# Patient Record
Sex: FEMALE | Race: WHITE | Employment: OTHER | URBAN - NONMETROPOLITAN AREA
[De-identification: names, ages, dates, MRNs, and addresses within clinical notes are randomized per-mention and may not be internally consistent; named-entity substitution may affect disease eponyms.]

---

## 2018-10-11 ENCOUNTER — APPOINTMENT (OUTPATIENT)
Dept: GENERAL RADIOLOGY | Age: 73
DRG: 481 | End: 2018-10-11
Payer: MEDICARE

## 2018-10-11 ENCOUNTER — HOSPITAL ENCOUNTER (INPATIENT)
Age: 73
LOS: 11 days | Discharge: NURSING, REHAB OR LONG-TERM CARE | DRG: 481 | End: 2018-10-22
Attending: FAMILY MEDICINE | Admitting: INTERNAL MEDICINE
Payer: MEDICARE

## 2018-10-11 DIAGNOSIS — S72.144A CLOSED NONDISPLACED INTERTROCHANTERIC FRACTURE OF RIGHT FEMUR, INITIAL ENCOUNTER (HCC): ICD-10-CM

## 2018-10-11 DIAGNOSIS — N30.00 ACUTE CYSTITIS WITHOUT HEMATURIA: ICD-10-CM

## 2018-10-11 DIAGNOSIS — S72.21XA CLOSED DISPLACED SUBTROCHANTERIC FRACTURE OF RIGHT FEMUR, INITIAL ENCOUNTER (HCC): Primary | ICD-10-CM

## 2018-10-11 PROBLEM — S72.141A CLOSED INTERTROCHANTERIC FRACTURE OF RIGHT FEMUR (HCC): Status: ACTIVE | Noted: 2018-10-11

## 2018-10-11 PROBLEM — S72.141A CLOSED DISPLACED INTERTROCHANTERIC FRACTURE OF RIGHT FEMUR (HCC): Status: ACTIVE | Noted: 2018-10-11

## 2018-10-11 LAB
-: ABNORMAL
ABSOLUTE EOS #: 0.2 K/UL (ref 0–0.4)
ABSOLUTE IMMATURE GRANULOCYTE: ABNORMAL K/UL (ref 0–0.3)
ABSOLUTE LYMPH #: 1.4 K/UL (ref 1–4.8)
ABSOLUTE MONO #: 0.5 K/UL (ref 0–1)
AMORPHOUS: ABNORMAL
ANION GAP SERPL CALCULATED.3IONS-SCNC: 12 MMOL/L (ref 9–17)
BACTERIA: ABNORMAL
BASOPHILS # BLD: 0 % (ref 0–2)
BASOPHILS ABSOLUTE: 0 K/UL (ref 0–0.2)
BILIRUBIN URINE: NEGATIVE
BUN BLDV-MCNC: 20 MG/DL (ref 8–23)
BUN/CREAT BLD: 27 (ref 9–20)
CALCIUM SERPL-MCNC: 9 MG/DL (ref 8.6–10.4)
CASTS UA: ABNORMAL /LPF
CHLORIDE BLD-SCNC: 103 MMOL/L (ref 98–107)
CO2: 23 MMOL/L (ref 20–31)
COLOR: YELLOW
COMMENT UA: ABNORMAL
CREAT SERPL-MCNC: 0.74 MG/DL (ref 0.5–0.9)
CRYSTALS, UA: ABNORMAL /HPF
DIFFERENTIAL TYPE: YES
EKG ATRIAL RATE: 71 BPM
EKG P AXIS: 63 DEGREES
EKG P-R INTERVAL: 180 MS
EKG Q-T INTERVAL: 424 MS
EKG QRS DURATION: 106 MS
EKG QTC CALCULATION (BAZETT): 460 MS
EKG R AXIS: 54 DEGREES
EKG T AXIS: 87 DEGREES
EKG VENTRICULAR RATE: 71 BPM
EOSINOPHILS RELATIVE PERCENT: 2 % (ref 0–5)
EPITHELIAL CELLS UA: ABNORMAL /HPF
GFR AFRICAN AMERICAN: >60 ML/MIN
GFR NON-AFRICAN AMERICAN: >60 ML/MIN
GFR SERPL CREATININE-BSD FRML MDRD: ABNORMAL ML/MIN/{1.73_M2}
GFR SERPL CREATININE-BSD FRML MDRD: ABNORMAL ML/MIN/{1.73_M2}
GLUCOSE BLD-MCNC: 130 MG/DL (ref 70–99)
GLUCOSE URINE: NEGATIVE
HCT VFR BLD CALC: 43.3 % (ref 36–46)
HEMOGLOBIN: 14.3 G/DL (ref 12–16)
IMMATURE GRANULOCYTES: ABNORMAL %
INR BLD: 1.1
KETONES, URINE: NEGATIVE
LEUKOCYTE ESTERASE, URINE: NEGATIVE
LYMPHOCYTES # BLD: 16 % (ref 15–40)
MCH RBC QN AUTO: 29.1 PG (ref 26–34)
MCHC RBC AUTO-ENTMCNC: 33.1 G/DL (ref 31–37)
MCV RBC AUTO: 88.1 FL (ref 80–100)
MONOCYTES # BLD: 6 % (ref 4–8)
MUCUS: ABNORMAL
NITRITE, URINE: NEGATIVE
NRBC AUTOMATED: ABNORMAL PER 100 WBC
OTHER OBSERVATIONS UA: ABNORMAL
PDW BLD-RTO: 15.1 % (ref 12.1–15.2)
PH UA: 6.5 (ref 5–8)
PLATELET # BLD: 251 K/UL (ref 140–450)
PLATELET ESTIMATE: ABNORMAL
PMV BLD AUTO: ABNORMAL FL (ref 6–12)
POTASSIUM SERPL-SCNC: 3.3 MMOL/L (ref 3.7–5.3)
PROTEIN UA: NEGATIVE
PROTHROMBIN TIME: 10.4 SEC (ref 9–11.6)
RBC # BLD: 4.91 M/UL (ref 4–5.2)
RBC # BLD: ABNORMAL 10*6/UL
RBC UA: ABNORMAL /HPF (ref 0–2)
RENAL EPITHELIAL, UA: ABNORMAL /HPF
SEG NEUTROPHILS: 76 % (ref 47–75)
SEGMENTED NEUTROPHILS ABSOLUTE COUNT: 6.7 K/UL (ref 2.5–7)
SODIUM BLD-SCNC: 138 MMOL/L (ref 135–144)
SPECIFIC GRAVITY UA: 1.01 (ref 1–1.03)
TRICHOMONAS: ABNORMAL
TURBIDITY: CLEAR
URINE HGB: ABNORMAL
UROBILINOGEN, URINE: NORMAL
WBC # BLD: 8.8 K/UL (ref 3.5–11)
WBC # BLD: ABNORMAL 10*3/UL
WBC UA: ABNORMAL /HPF
YEAST: ABNORMAL

## 2018-10-11 PROCEDURE — 94760 N-INVAS EAR/PLS OXIMETRY 1: CPT

## 2018-10-11 PROCEDURE — 99222 1ST HOSP IP/OBS MODERATE 55: CPT | Performed by: INTERNAL MEDICINE

## 2018-10-11 PROCEDURE — 2500000003 HC RX 250 WO HCPCS: Performed by: INTERNAL MEDICINE

## 2018-10-11 PROCEDURE — 6370000000 HC RX 637 (ALT 250 FOR IP): Performed by: INTERNAL MEDICINE

## 2018-10-11 PROCEDURE — 6370000000 HC RX 637 (ALT 250 FOR IP): Performed by: ORTHOPAEDIC SURGERY

## 2018-10-11 PROCEDURE — 1200000000 HC SEMI PRIVATE

## 2018-10-11 PROCEDURE — 71045 X-RAY EXAM CHEST 1 VIEW: CPT

## 2018-10-11 PROCEDURE — 85610 PROTHROMBIN TIME: CPT

## 2018-10-11 PROCEDURE — 96376 TX/PRO/DX INJ SAME DRUG ADON: CPT

## 2018-10-11 PROCEDURE — 99285 EMERGENCY DEPT VISIT HI MDM: CPT

## 2018-10-11 PROCEDURE — 96375 TX/PRO/DX INJ NEW DRUG ADDON: CPT

## 2018-10-11 PROCEDURE — 81001 URINALYSIS AUTO W/SCOPE: CPT

## 2018-10-11 PROCEDURE — 93005 ELECTROCARDIOGRAM TRACING: CPT

## 2018-10-11 PROCEDURE — 85025 COMPLETE CBC W/AUTO DIFF WBC: CPT

## 2018-10-11 PROCEDURE — 6360000002 HC RX W HCPCS: Performed by: INTERNAL MEDICINE

## 2018-10-11 PROCEDURE — 80048 BASIC METABOLIC PNL TOTAL CA: CPT

## 2018-10-11 PROCEDURE — 73502 X-RAY EXAM HIP UNI 2-3 VIEWS: CPT

## 2018-10-11 PROCEDURE — 6360000002 HC RX W HCPCS: Performed by: FAMILY MEDICINE

## 2018-10-11 PROCEDURE — 96374 THER/PROPH/DIAG INJ IV PUSH: CPT

## 2018-10-11 RX ORDER — POTASSIUM CHLORIDE 750 MG/1
10 TABLET, FILM COATED, EXTENDED RELEASE ORAL DAILY
COMMUNITY
End: 2022-08-18 | Stop reason: SDUPTHER

## 2018-10-11 RX ORDER — OXYCODONE HYDROCHLORIDE AND ACETAMINOPHEN 5; 325 MG/1; MG/1
2 TABLET ORAL EVERY 4 HOURS PRN
Status: DISCONTINUED | OUTPATIENT
Start: 2018-10-11 | End: 2018-10-12

## 2018-10-11 RX ORDER — ENALAPRIL MALEATE 5 MG/1
20 TABLET ORAL DAILY
Status: DISCONTINUED | OUTPATIENT
Start: 2018-10-12 | End: 2018-10-22 | Stop reason: HOSPADM

## 2018-10-11 RX ORDER — FENTANYL CITRATE 50 UG/ML
50 INJECTION, SOLUTION INTRAMUSCULAR; INTRAVENOUS ONCE
Status: COMPLETED | OUTPATIENT
Start: 2018-10-11 | End: 2018-10-11

## 2018-10-11 RX ORDER — SODIUM CHLORIDE 0.9 % (FLUSH) 0.9 %
10 SYRINGE (ML) INJECTION PRN
Status: DISCONTINUED | OUTPATIENT
Start: 2018-10-11 | End: 2018-10-12

## 2018-10-11 RX ORDER — ONDANSETRON 2 MG/ML
4 INJECTION INTRAMUSCULAR; INTRAVENOUS EVERY 6 HOURS PRN
Status: DISCONTINUED | OUTPATIENT
Start: 2018-10-11 | End: 2018-10-12

## 2018-10-11 RX ORDER — SODIUM CHLORIDE 9 MG/ML
INJECTION, SOLUTION INTRAVENOUS CONTINUOUS
Status: DISCONTINUED | OUTPATIENT
Start: 2018-10-11 | End: 2018-10-12

## 2018-10-11 RX ORDER — LABETALOL HYDROCHLORIDE 5 MG/ML
10 INJECTION, SOLUTION INTRAVENOUS EVERY 4 HOURS PRN
Status: DISCONTINUED | OUTPATIENT
Start: 2018-10-11 | End: 2018-10-12

## 2018-10-11 RX ORDER — OMEGA-3 FATTY ACIDS/FISH OIL 300-1000MG
200 CAPSULE ORAL 3 TIMES DAILY PRN
COMMUNITY
End: 2018-10-11 | Stop reason: ALTCHOICE

## 2018-10-11 RX ORDER — MORPHINE SULFATE 2 MG/ML
4 INJECTION, SOLUTION INTRAMUSCULAR; INTRAVENOUS EVERY 4 HOURS PRN
Status: DISCONTINUED | OUTPATIENT
Start: 2018-10-11 | End: 2018-10-11

## 2018-10-11 RX ORDER — OXYCODONE HYDROCHLORIDE AND ACETAMINOPHEN 5; 325 MG/1; MG/1
1 TABLET ORAL EVERY 4 HOURS PRN
Status: DISCONTINUED | OUTPATIENT
Start: 2018-10-11 | End: 2018-10-12

## 2018-10-11 RX ORDER — TRAMADOL HYDROCHLORIDE 50 MG/1
50 TABLET ORAL 2 TIMES DAILY PRN
Status: ON HOLD | COMMUNITY
End: 2018-10-22 | Stop reason: HOSPADM

## 2018-10-11 RX ORDER — ONDANSETRON 2 MG/ML
4 INJECTION INTRAMUSCULAR; INTRAVENOUS ONCE
Status: COMPLETED | OUTPATIENT
Start: 2018-10-11 | End: 2018-10-11

## 2018-10-11 RX ORDER — ATORVASTATIN CALCIUM 20 MG/1
40 TABLET, FILM COATED ORAL DAILY
Status: DISCONTINUED | OUTPATIENT
Start: 2018-10-12 | End: 2018-10-22 | Stop reason: HOSPADM

## 2018-10-11 RX ORDER — SODIUM CHLORIDE 0.9 % (FLUSH) 0.9 %
10 SYRINGE (ML) INJECTION EVERY 12 HOURS SCHEDULED
Status: DISCONTINUED | OUTPATIENT
Start: 2018-10-11 | End: 2018-10-12

## 2018-10-11 RX ORDER — ATORVASTATIN CALCIUM 40 MG/1
40 TABLET, FILM COATED ORAL DAILY
COMMUNITY
End: 2021-06-04

## 2018-10-11 RX ORDER — POTASSIUM CHLORIDE 750 MG/1
40 TABLET, FILM COATED, EXTENDED RELEASE ORAL ONCE
Status: COMPLETED | OUTPATIENT
Start: 2018-10-11 | End: 2018-10-11

## 2018-10-11 RX ORDER — ACETAMINOPHEN 325 MG/1
650 TABLET ORAL EVERY 4 HOURS PRN
Status: DISCONTINUED | OUTPATIENT
Start: 2018-10-11 | End: 2018-10-22 | Stop reason: HOSPADM

## 2018-10-11 RX ORDER — CLINDAMYCIN PHOSPHATE 600 MG/50ML
600 INJECTION INTRAVENOUS
Status: DISCONTINUED | OUTPATIENT
Start: 2018-10-12 | End: 2018-10-12

## 2018-10-11 RX ADMIN — OXYCODONE HYDROCHLORIDE AND ACETAMINOPHEN 2 TABLET: 5; 325 TABLET ORAL at 15:06

## 2018-10-11 RX ADMIN — FENTANYL CITRATE 50 MCG: 50 INJECTION INTRAMUSCULAR; INTRAVENOUS at 12:39

## 2018-10-11 RX ADMIN — POTASSIUM CHLORIDE 40 MEQ: 750 TABLET, FILM COATED, EXTENDED RELEASE ORAL at 13:38

## 2018-10-11 RX ADMIN — FENTANYL CITRATE 50 MCG: 50 INJECTION INTRAMUSCULAR; INTRAVENOUS at 11:43

## 2018-10-11 RX ADMIN — HYDROMORPHONE HYDROCHLORIDE 1 MG: 1 INJECTION, SOLUTION INTRAMUSCULAR; INTRAVENOUS; SUBCUTANEOUS at 16:18

## 2018-10-11 RX ADMIN — ONDANSETRON 4 MG: 2 INJECTION INTRAMUSCULAR; INTRAVENOUS at 11:43

## 2018-10-11 RX ADMIN — LABETALOL HYDROCHLORIDE 10 MG: 5 INJECTION, SOLUTION INTRAVENOUS at 18:21

## 2018-10-11 RX ADMIN — ONDANSETRON 4 MG: 2 INJECTION INTRAMUSCULAR; INTRAVENOUS at 23:47

## 2018-10-11 RX ADMIN — MORPHINE SULFATE 4 MG: 2 INJECTION, SOLUTION INTRAMUSCULAR; INTRAVENOUS at 13:38

## 2018-10-11 RX ADMIN — ONDANSETRON 4 MG: 2 INJECTION INTRAMUSCULAR; INTRAVENOUS at 16:50

## 2018-10-11 ASSESSMENT — PAIN SCALES - GENERAL
PAINLEVEL_OUTOF10: 10
PAINLEVEL_OUTOF10: 8
PAINLEVEL_OUTOF10: 4
PAINLEVEL_OUTOF10: 8
PAINLEVEL_OUTOF10: 10
PAINLEVEL_OUTOF10: 9
PAINLEVEL_OUTOF10: 5

## 2018-10-11 ASSESSMENT — PAIN DESCRIPTION - FREQUENCY: FREQUENCY: CONTINUOUS

## 2018-10-11 ASSESSMENT — PAIN DESCRIPTION - LOCATION
LOCATION: HIP
LOCATION: HIP

## 2018-10-11 ASSESSMENT — PAIN DESCRIPTION - ORIENTATION
ORIENTATION: RIGHT
ORIENTATION: RIGHT

## 2018-10-11 ASSESSMENT — PAIN DESCRIPTION - PAIN TYPE
TYPE: ACUTE PAIN
TYPE: ACUTE PAIN

## 2018-10-11 ASSESSMENT — PAIN DESCRIPTION - DESCRIPTORS: DESCRIPTORS: ACHING;SHOOTING

## 2018-10-11 NOTE — ED NOTES
Dr. Antonia Torres at bedside with pt and family. Pt signed informed consent for surgery tomorrow.       Rosemarie Meeks RN  10/11/18 201 [facility] Dayna Shelley  10/11/18 5445

## 2018-10-11 NOTE — CONSULTS
[address]                            Amie Quinteros, [address]                                 CONSULTATION    PATIENT NAME: Joni Flores                      :         1945  MED REC NO:   [id number]                              ROOM:       8156  ACCOUNT NO:   [de-identified]                           ADMIT DATE:  10/11/2018  PROVIDER:     Annette Hayes    CONSULT DATE:  10/11/2018    CONSULTING PHYSICIAN:  Dr. Isai Rasmussen. REASON FOR CONSULTATION:  Right hip fracture, status post fall. PLACE OF CONSULTATION:  Emergency Department, [facility],  Room #1. HISTORY OF PRESENT ILLNESS:  The patient is a 68-year-old white  female, who states she was playing with her puppy. The puppy is a  11month-old beagle. She did get caught up with regard to her feet and  she subsequently fell on the right side. This did happen this a.m. She did have pain, difficulty, felt a pain in the hip specifically. She was brought into the emergency department by EMS, where she is  found to have a hip fracture. She has never had problem with this hip  in the past.  She has had multiple fractures in the past including a  right elbow fracture as well as a right ankle fracture requiring  surgical intervention. She denies any other injury. No head trauma  or loss of consciousness. She does live by herself. Her family  including daughter, granddaughter do accompany her here to the ER. PAST MEDICAL HISTORY:  Significant for left foot peroneal nerve palsy,  she describes this secondary to a back problem, hypertension, multiple  fractures as listed above. PAST SURGICAL HISTORY:  Significant for appendectomy, hysterectomy,  cataracts, right ankle surgery. She also reports a left wrist  surgery. SOCIAL HISTORY:  Again, the patient does live by herself. She denies  any smoking history. No alcohol use.     MEDICATIONS:  Please see the list.

## 2018-10-11 NOTE — PROGRESS NOTES
Dr. Gloria Bazan called and made aware that pt continue to have severe 10/10 pain and crying out loud after 4mg Morphine IV given. Dr. Gloria Bazan states she will order Dilaudid at this time. Dr. Chanel Ruiz also called and made aware of pts uncontrolled pain. New order for 10 lbs bucks traction to the right leg and Percocet 1-2 tabs Q4H PRN.

## 2018-10-11 NOTE — PROGRESS NOTES
10 lbs bucks traction applied to right leg. Pt tolerates fairly well and states relief from traction.

## 2018-10-11 NOTE — ED PROVIDER NOTES
ondansetron [facility]) injection 4 mg (4 mg Intravenous Given 10/11/18 1143)   fentaNYL (SUBLIMAZE) injection 50 mcg (50 mcg Intravenous Given 10/11/18 1239)       New Prescriptions from this visit:    New Prescriptions    No medications on file       Follow-up:  No follow-up provider specified. Final Impression:   1.  Closed displaced subtrochanteric fracture of right femur, initial encounter ([facility] [address]               (Please note that portions of this note were completed with a voice recognition program.  Efforts were made to edit the dictations but occasionally words are mis-transcribed.)    MD Marlene Joseph MD  10/11/18 4503

## 2018-10-12 ENCOUNTER — APPOINTMENT (OUTPATIENT)
Dept: GENERAL RADIOLOGY | Age: 73
DRG: 481 | End: 2018-10-12
Payer: MEDICARE

## 2018-10-12 ENCOUNTER — ANESTHESIA EVENT (OUTPATIENT)
Dept: OPERATING ROOM | Age: 73
DRG: 481 | End: 2018-10-12
Payer: MEDICARE

## 2018-10-12 ENCOUNTER — ANESTHESIA (OUTPATIENT)
Dept: OPERATING ROOM | Age: 73
DRG: 481 | End: 2018-10-12
Payer: MEDICARE

## 2018-10-12 VITALS
OXYGEN SATURATION: 100 % | TEMPERATURE: 98.6 F | SYSTOLIC BLOOD PRESSURE: 103 MMHG | DIASTOLIC BLOOD PRESSURE: 54 MMHG | RESPIRATION RATE: 14 BRPM

## 2018-10-12 LAB
ABO/RH: NORMAL
ABSOLUTE EOS #: 0 K/UL (ref 0–0.4)
ABSOLUTE IMMATURE GRANULOCYTE: ABNORMAL K/UL (ref 0–0.3)
ABSOLUTE LYMPH #: 1.2 K/UL (ref 1–4.8)
ABSOLUTE MONO #: 1.2 K/UL (ref 0–1)
ANION GAP SERPL CALCULATED.3IONS-SCNC: 8 MMOL/L (ref 9–17)
ANTIBODY SCREEN: NEGATIVE
ARM BAND NUMBER: NORMAL
BASOPHILS # BLD: 0 % (ref 0–2)
BASOPHILS ABSOLUTE: 0 K/UL (ref 0–0.2)
BUN BLDV-MCNC: 30 MG/DL (ref 8–23)
BUN/CREAT BLD: 36 (ref 9–20)
CALCIUM SERPL-MCNC: 8.4 MG/DL (ref 8.6–10.4)
CHLORIDE BLD-SCNC: 105 MMOL/L (ref 98–107)
CO2: 24 MMOL/L (ref 20–31)
CREAT SERPL-MCNC: 0.84 MG/DL (ref 0.5–0.9)
DIFFERENTIAL TYPE: YES
EOSINOPHILS RELATIVE PERCENT: 0 % (ref 0–5)
EXPIRATION DATE: NORMAL
GFR AFRICAN AMERICAN: >60 ML/MIN
GFR NON-AFRICAN AMERICAN: >60 ML/MIN
GFR SERPL CREATININE-BSD FRML MDRD: ABNORMAL ML/MIN/{1.73_M2}
GFR SERPL CREATININE-BSD FRML MDRD: ABNORMAL ML/MIN/{1.73_M2}
GLUCOSE BLD-MCNC: 155 MG/DL (ref 70–99)
HCT VFR BLD CALC: 35.5 % (ref 36–46)
HEMOGLOBIN: 11.8 G/DL (ref 12–16)
IMMATURE GRANULOCYTES: ABNORMAL %
LYMPHOCYTES # BLD: 10 % (ref 15–40)
MAGNESIUM: 2.1 MG/DL (ref 1.6–2.6)
MCH RBC QN AUTO: 29.2 PG (ref 26–34)
MCHC RBC AUTO-ENTMCNC: 33.1 G/DL (ref 31–37)
MCV RBC AUTO: 88.1 FL (ref 80–100)
MONOCYTES # BLD: 10 % (ref 4–8)
NRBC AUTOMATED: ABNORMAL PER 100 WBC
PDW BLD-RTO: 15.4 % (ref 12.1–15.2)
PLATELET # BLD: 243 K/UL (ref 140–450)
PLATELET ESTIMATE: ABNORMAL
PMV BLD AUTO: ABNORMAL FL (ref 6–12)
POTASSIUM SERPL-SCNC: 4.4 MMOL/L (ref 3.7–5.3)
RBC # BLD: 4.03 M/UL (ref 4–5.2)
RBC # BLD: ABNORMAL 10*6/UL
SEG NEUTROPHILS: 80 % (ref 47–75)
SEGMENTED NEUTROPHILS ABSOLUTE COUNT: 9.8 K/UL (ref 2.5–7)
SODIUM BLD-SCNC: 137 MMOL/L (ref 135–144)
WBC # BLD: 12.2 K/UL (ref 3.5–11)
WBC # BLD: ABNORMAL 10*3/UL

## 2018-10-12 PROCEDURE — 2580000003 HC RX 258: Performed by: ORTHOPAEDIC SURGERY

## 2018-10-12 PROCEDURE — 76000 FLUOROSCOPY <1 HR PHYS/QHP: CPT

## 2018-10-12 PROCEDURE — 94761 N-INVAS EAR/PLS OXIMETRY MLT: CPT

## 2018-10-12 PROCEDURE — 6370000000 HC RX 637 (ALT 250 FOR IP): Performed by: ORTHOPAEDIC SURGERY

## 2018-10-12 PROCEDURE — 86850 RBC ANTIBODY SCREEN: CPT

## 2018-10-12 PROCEDURE — 86901 BLOOD TYPING SEROLOGIC RH(D): CPT

## 2018-10-12 PROCEDURE — 85025 COMPLETE CBC W/AUTO DIFF WBC: CPT

## 2018-10-12 PROCEDURE — 36415 COLL VENOUS BLD VENIPUNCTURE: CPT

## 2018-10-12 PROCEDURE — 6360000002 HC RX W HCPCS: Performed by: NURSE ANESTHETIST, CERTIFIED REGISTERED

## 2018-10-12 PROCEDURE — 0QS604Z REPOSITION RIGHT UPPER FEMUR WITH INTERNAL FIXATION DEVICE, OPEN APPROACH: ICD-10-PCS | Performed by: ORTHOPAEDIC SURGERY

## 2018-10-12 PROCEDURE — C1713 ANCHOR/SCREW BN/BN,TIS/BN: HCPCS | Performed by: ORTHOPAEDIC SURGERY

## 2018-10-12 PROCEDURE — 3700000000 HC ANESTHESIA ATTENDED CARE: Performed by: ORTHOPAEDIC SURGERY

## 2018-10-12 PROCEDURE — 80048 BASIC METABOLIC PNL TOTAL CA: CPT

## 2018-10-12 PROCEDURE — 7100000000 HC PACU RECOVERY - FIRST 15 MIN: Performed by: ORTHOPAEDIC SURGERY

## 2018-10-12 PROCEDURE — 2580000003 HC RX 258: Performed by: INTERNAL MEDICINE

## 2018-10-12 PROCEDURE — C9113 INJ PANTOPRAZOLE SODIUM, VIA: HCPCS | Performed by: INTERNAL MEDICINE

## 2018-10-12 PROCEDURE — 1200000000 HC SEMI PRIVATE

## 2018-10-12 PROCEDURE — 6360000002 HC RX W HCPCS: Performed by: INTERNAL MEDICINE

## 2018-10-12 PROCEDURE — 86900 BLOOD TYPING SEROLOGIC ABO: CPT

## 2018-10-12 PROCEDURE — 3600000014 HC SURGERY LEVEL 4 ADDTL 15MIN: Performed by: ORTHOPAEDIC SURGERY

## 2018-10-12 PROCEDURE — 2500000003 HC RX 250 WO HCPCS: Performed by: NURSE ANESTHETIST, CERTIFIED REGISTERED

## 2018-10-12 PROCEDURE — 3700000001 HC ADD 15 MINUTES (ANESTHESIA): Performed by: ORTHOPAEDIC SURGERY

## 2018-10-12 PROCEDURE — 3600000004 HC SURGERY LEVEL 4 BASE: Performed by: ORTHOPAEDIC SURGERY

## 2018-10-12 PROCEDURE — 2709999900 HC NON-CHARGEABLE SUPPLY: Performed by: ORTHOPAEDIC SURGERY

## 2018-10-12 PROCEDURE — 2500000003 HC RX 250 WO HCPCS: Performed by: ORTHOPAEDIC SURGERY

## 2018-10-12 PROCEDURE — 7100000001 HC PACU RECOVERY - ADDTL 15 MIN: Performed by: ORTHOPAEDIC SURGERY

## 2018-10-12 PROCEDURE — 83735 ASSAY OF MAGNESIUM: CPT

## 2018-10-12 DEVICE — SCREW BNE L30MM DIA5MM HEX HD DIA35MM CORT TIB TI ALLY FIX: Type: IMPLANTABLE DEVICE | Site: HIP | Status: FUNCTIONAL

## 2018-10-12 DEVICE — ZNN CMN NAIL 13MMX21.5CM 130R
Type: IMPLANTABLE DEVICE | Site: HIP | Status: FUNCTIONAL
Brand: ZIMMER® NATURAL NAIL® SYSTEM

## 2018-10-12 DEVICE — ZNN CMN LAG SCREW 10.5X100
Type: IMPLANTABLE DEVICE | Site: HIP | Status: FUNCTIONAL
Brand: ZIMMER® NATURAL NAIL® SYSTEM

## 2018-10-12 RX ORDER — PROPOFOL 10 MG/ML
INJECTION, EMULSION INTRAVENOUS PRN
Status: DISCONTINUED | OUTPATIENT
Start: 2018-10-12 | End: 2018-10-12 | Stop reason: SDUPTHER

## 2018-10-12 RX ORDER — GLYCOPYRROLATE 1 MG/5 ML
SYRINGE (ML) INTRAVENOUS PRN
Status: DISCONTINUED | OUTPATIENT
Start: 2018-10-12 | End: 2018-10-12 | Stop reason: SDUPTHER

## 2018-10-12 RX ORDER — METOCLOPRAMIDE HYDROCHLORIDE 5 MG/ML
INJECTION INTRAMUSCULAR; INTRAVENOUS PRN
Status: DISCONTINUED | OUTPATIENT
Start: 2018-10-12 | End: 2018-10-12 | Stop reason: SDUPTHER

## 2018-10-12 RX ORDER — DOCUSATE SODIUM 100 MG/1
100 CAPSULE, LIQUID FILLED ORAL 2 TIMES DAILY
Status: DISCONTINUED | OUTPATIENT
Start: 2018-10-12 | End: 2018-10-22 | Stop reason: HOSPADM

## 2018-10-12 RX ORDER — GINSENG 100 MG
CAPSULE ORAL PRN
Status: DISCONTINUED | OUTPATIENT
Start: 2018-10-12 | End: 2018-10-22 | Stop reason: HOSPADM

## 2018-10-12 RX ORDER — CLINDAMYCIN PHOSPHATE 900 MG/50ML
900 INJECTION INTRAVENOUS EVERY 8 HOURS
Status: COMPLETED | OUTPATIENT
Start: 2018-10-12 | End: 2018-10-13

## 2018-10-12 RX ORDER — DEXAMETHASONE SODIUM PHOSPHATE 10 MG/ML
INJECTION INTRAMUSCULAR; INTRAVENOUS PRN
Status: DISCONTINUED | OUTPATIENT
Start: 2018-10-12 | End: 2018-10-12 | Stop reason: SDUPTHER

## 2018-10-12 RX ORDER — POTASSIUM CHLORIDE 750 MG/1
10 TABLET, FILM COATED, EXTENDED RELEASE ORAL DAILY
Status: DISCONTINUED | OUTPATIENT
Start: 2018-10-12 | End: 2018-10-22 | Stop reason: HOSPADM

## 2018-10-12 RX ORDER — FENTANYL CITRATE 50 UG/ML
INJECTION, SOLUTION INTRAMUSCULAR; INTRAVENOUS PRN
Status: DISCONTINUED | OUTPATIENT
Start: 2018-10-12 | End: 2018-10-12 | Stop reason: SDUPTHER

## 2018-10-12 RX ORDER — HYDROCODONE BITARTRATE AND ACETAMINOPHEN 5; 325 MG/1; MG/1
2 TABLET ORAL EVERY 4 HOURS PRN
Status: DISCONTINUED | OUTPATIENT
Start: 2018-10-12 | End: 2018-10-22 | Stop reason: HOSPADM

## 2018-10-12 RX ORDER — ROPIVACAINE HYDROCHLORIDE 5 MG/ML
INJECTION, SOLUTION EPIDURAL; INFILTRATION; PERINEURAL PRN
Status: DISCONTINUED | OUTPATIENT
Start: 2018-10-12 | End: 2018-10-12 | Stop reason: SDUPTHER

## 2018-10-12 RX ORDER — ACETAMINOPHEN 325 MG/1
650 TABLET ORAL EVERY 4 HOURS PRN
Status: DISCONTINUED | OUTPATIENT
Start: 2018-10-12 | End: 2018-10-22 | Stop reason: HOSPADM

## 2018-10-12 RX ORDER — SODIUM CHLORIDE 0.9 % (FLUSH) 0.9 %
10 SYRINGE (ML) INJECTION PRN
Status: DISCONTINUED | OUTPATIENT
Start: 2018-10-12 | End: 2018-10-21

## 2018-10-12 RX ORDER — PROPOFOL 10 MG/ML
INJECTION, EMULSION INTRAVENOUS CONTINUOUS PRN
Status: DISCONTINUED | OUTPATIENT
Start: 2018-10-12 | End: 2018-10-12 | Stop reason: SDUPTHER

## 2018-10-12 RX ORDER — LIDOCAINE HYDROCHLORIDE 10 MG/ML
INJECTION, SOLUTION EPIDURAL; INFILTRATION; INTRACAUDAL; PERINEURAL PRN
Status: DISCONTINUED | OUTPATIENT
Start: 2018-10-12 | End: 2018-10-12 | Stop reason: SDUPTHER

## 2018-10-12 RX ORDER — SODIUM CHLORIDE 9 MG/ML
INJECTION, SOLUTION INTRAVENOUS CONTINUOUS
Status: DISCONTINUED | OUTPATIENT
Start: 2018-10-12 | End: 2018-10-13

## 2018-10-12 RX ORDER — ONDANSETRON 2 MG/ML
4 INJECTION INTRAMUSCULAR; INTRAVENOUS EVERY 6 HOURS PRN
Status: DISCONTINUED | OUTPATIENT
Start: 2018-10-12 | End: 2018-10-22 | Stop reason: HOSPADM

## 2018-10-12 RX ORDER — HYDROCODONE BITARTRATE AND ACETAMINOPHEN 5; 325 MG/1; MG/1
1 TABLET ORAL EVERY 4 HOURS PRN
Status: DISCONTINUED | OUTPATIENT
Start: 2018-10-12 | End: 2018-10-22 | Stop reason: HOSPADM

## 2018-10-12 RX ORDER — PANTOPRAZOLE SODIUM 40 MG/10ML
40 INJECTION, POWDER, LYOPHILIZED, FOR SOLUTION INTRAVENOUS ONCE
Status: COMPLETED | OUTPATIENT
Start: 2018-10-12 | End: 2018-10-12

## 2018-10-12 RX ORDER — SODIUM CHLORIDE 0.9 % (FLUSH) 0.9 %
10 SYRINGE (ML) INJECTION EVERY 12 HOURS SCHEDULED
Status: DISCONTINUED | OUTPATIENT
Start: 2018-10-12 | End: 2018-10-21

## 2018-10-12 RX ORDER — ONDANSETRON 2 MG/ML
INJECTION INTRAMUSCULAR; INTRAVENOUS PRN
Status: DISCONTINUED | OUTPATIENT
Start: 2018-10-12 | End: 2018-10-12 | Stop reason: SDUPTHER

## 2018-10-12 RX ADMIN — ROPIVACAINE HYDROCHLORIDE 30 ML: 5 INJECTION, SOLUTION EPIDURAL; INFILTRATION; PERINEURAL at 12:40

## 2018-10-12 RX ADMIN — PROPOFOL 100 MG: 10 INJECTION, EMULSION INTRAVENOUS at 13:55

## 2018-10-12 RX ADMIN — SODIUM CHLORIDE: 9 INJECTION, SOLUTION INTRAVENOUS at 12:49

## 2018-10-12 RX ADMIN — PHENYLEPHRINE HYDROCHLORIDE 100 MCG: 10 INJECTION INTRAVENOUS at 14:10

## 2018-10-12 RX ADMIN — PANTOPRAZOLE SODIUM 40 MG: 40 INJECTION, POWDER, FOR SOLUTION INTRAVENOUS at 07:51

## 2018-10-12 RX ADMIN — DEXAMETHASONE SODIUM PHOSPHATE 10 MG: 10 INJECTION INTRAMUSCULAR; INTRAVENOUS at 13:55

## 2018-10-12 RX ADMIN — Medication 10 ML: at 07:51

## 2018-10-12 RX ADMIN — CLINDAMYCIN PHOSPHATE 600 MG: 12 INJECTION, SOLUTION INTRAMUSCULAR; INTRAVENOUS at 13:17

## 2018-10-12 RX ADMIN — CLINDAMYCIN PHOSPHATE 900 MG: 18 INJECTION, SOLUTION INTRAMUSCULAR; INTRAVENOUS at 20:58

## 2018-10-12 RX ADMIN — PROPOFOL 100 MCG/KG/MIN: 10 INJECTION, EMULSION INTRAVENOUS at 13:55

## 2018-10-12 RX ADMIN — DOCUSATE SODIUM 100 MG: 100 CAPSULE, LIQUID FILLED ORAL at 20:58

## 2018-10-12 RX ADMIN — HYDROMORPHONE HYDROCHLORIDE 1 MG: 1 INJECTION, SOLUTION INTRAMUSCULAR; INTRAVENOUS; SUBCUTANEOUS at 08:10

## 2018-10-12 RX ADMIN — SODIUM CHLORIDE: 9 INJECTION, SOLUTION INTRAVENOUS at 02:03

## 2018-10-12 RX ADMIN — Medication 0.2 MG: at 14:18

## 2018-10-12 RX ADMIN — SODIUM CHLORIDE: 9 INJECTION, SOLUTION INTRAVENOUS at 19:12

## 2018-10-12 RX ADMIN — METOCLOPRAMIDE 10 MG: 5 INJECTION, SOLUTION INTRAMUSCULAR; INTRAVENOUS at 12:40

## 2018-10-12 RX ADMIN — OXYCODONE HYDROCHLORIDE AND ACETAMINOPHEN 2 TABLET: 5; 325 TABLET ORAL at 00:17

## 2018-10-12 RX ADMIN — PHENYLEPHRINE HYDROCHLORIDE 100 MCG: 10 INJECTION INTRAVENOUS at 14:25

## 2018-10-12 RX ADMIN — PHENYLEPHRINE HYDROCHLORIDE 100 MCG: 10 INJECTION INTRAVENOUS at 14:34

## 2018-10-12 RX ADMIN — FENTANYL CITRATE 50 MCG: 50 INJECTION, SOLUTION INTRAMUSCULAR; INTRAVENOUS at 15:10

## 2018-10-12 RX ADMIN — ONDANSETRON 4 MG: 2 INJECTION, SOLUTION INTRAMUSCULAR; INTRAVENOUS at 13:55

## 2018-10-12 RX ADMIN — LIDOCAINE HYDROCHLORIDE 5 ML: 10 INJECTION, SOLUTION EPIDURAL; INFILTRATION; INTRACAUDAL; PERINEURAL at 13:55

## 2018-10-12 RX ADMIN — ONDANSETRON 4 MG: 2 INJECTION INTRAMUSCULAR; INTRAVENOUS at 06:45

## 2018-10-12 RX ADMIN — PHENYLEPHRINE HYDROCHLORIDE 100 MCG: 10 INJECTION INTRAVENOUS at 14:44

## 2018-10-12 ASSESSMENT — PAIN DESCRIPTION - LOCATION
LOCATION: HIP

## 2018-10-12 ASSESSMENT — PAIN DESCRIPTION - FREQUENCY: FREQUENCY: CONTINUOUS

## 2018-10-12 ASSESSMENT — PAIN SCALES - GENERAL
PAINLEVEL_OUTOF10: 10
PAINLEVEL_OUTOF10: 4
PAINLEVEL_OUTOF10: 5
PAINLEVEL_OUTOF10: 5
PAINLEVEL_OUTOF10: 1
PAINLEVEL_OUTOF10: 0
PAINLEVEL_OUTOF10: 1
PAINLEVEL_OUTOF10: 7
PAINLEVEL_OUTOF10: 2
PAINLEVEL_OUTOF10: 2
PAINLEVEL_OUTOF10: 5
PAINLEVEL_OUTOF10: 0
PAINLEVEL_OUTOF10: 1
PAINLEVEL_OUTOF10: 1
PAINLEVEL_OUTOF10: 10
PAINLEVEL_OUTOF10: 1

## 2018-10-12 ASSESSMENT — PAIN DESCRIPTION - PAIN TYPE
TYPE: SURGICAL PAIN
TYPE: ACUTE PAIN
TYPE: SURGICAL PAIN
TYPE: ACUTE PAIN
TYPE: SURGICAL PAIN

## 2018-10-12 ASSESSMENT — PAIN DESCRIPTION - DESCRIPTORS
DESCRIPTORS: ACHING
DESCRIPTORS: ACHING

## 2018-10-12 ASSESSMENT — PAIN DESCRIPTION - ORIENTATION
ORIENTATION: RIGHT

## 2018-10-12 NOTE — FLOWSHEET NOTE
Nurses in room for shift report. Pt is A&O x4. Patient lays in bed, watching TV and reports pain 1/10. Denies nausea. Neuro check negative for deficit. Pedal pulse 2+, foot warm to touch. Ice pack applied to surgical area. Snack of sherbet provided. Call light in reach.  Electronically signed by Gurinder Alston RN on 10/12/2018 at 7:52 PM

## 2018-10-12 NOTE — H&P
WBC Morphology NOT REPORTED     RBC Morphology NOT REPORTED     Platelet Estimate NOT REPORTED    Basic Metabolic Panel    Collection Time: 10/11/18 11:16 AM   Result Value Ref Range    Glucose 130 (H) 70 - 99 mg/dL    BUN 20 8 - 23 mg/dL    CREATININE 0.74 0.50 - 0.90 mg/dL    Bun/Cre Ratio 27 (H) 9 - 20    Calcium 9.0 8.6 - 10.4 mg/dL    Sodium 138 135 - 144 mmol/L    Potassium 3.3 (L) 3.7 - 5.3 mmol/L    Chloride 103 98 - 107 mmol/L    CO2 23 20 - 31 mmol/L    Anion Gap 12 9 - 17 mmol/L    GFR Non-African American >60 >60 mL/min    GFR African American >60 >60 mL/min    GFR Comment          GFR Staging NOT REPORTED    Protime-INR    Collection Time: 10/11/18 11:16 AM   Result Value Ref Range    Protime 10.4 9.0 - 11.6 sec    INR 1.1    Urinalysis with Microscopic    Collection Time: 10/11/18 11:38 AM   Result Value Ref Range    Color, UA YELLOW YEL    Turbidity UA CLEAR CLEAR    Glucose, Ur NEGATIVE NEG    Bilirubin Urine NEGATIVE NEG    Ketones, Urine NEGATIVE NEG    Specific Gravity, UA 1.015 1.005 - 1.030    Urine Hgb TRACE (A) NEG    pH, UA 6.5 5.0 - 8.0    Protein, UA NEGATIVE NEG    Urobilinogen, Urine Normal NORM    Nitrite, Urine NEGATIVE NEG    Leukocyte Esterase, Urine NEGATIVE NEG    Urinalysis Comments          -          WBC, UA NOT REPORTED 0 /HPF    RBC, UA 0 TO 2 0 - 2 /HPF    Casts UA NOT REPORTED /LPF    Crystals UA NOT REPORTED NONE /HPF    Epithelial Cells UA 0 TO 2 /HPF    Renal Epithelial, Urine NOT REPORTED 0 /HPF    Bacteria, UA NOT REPORTED NONE    Mucus, UA NOT REPORTED NONE    Trichomonas, UA NOT REPORTED NONE    Amorphous, UA NOT REPORTED NONE    Other Observations UA NOT REPORTED NREQ    Yeast, UA NOT REPORTED NONE   EKG 12 Lead    Collection Time: 10/11/18 11:38 AM   Result Value Ref Range    Ventricular Rate 71 BPM    Atrial Rate 71 BPM    P-R Interval 180 ms    QRS Duration 106 ms    Q-T Interval 424 ms    QTc Calculation (Bazett) 460 ms    P Axis 63 degrees    R Axis 54 degrees

## 2018-10-12 NOTE — PROGRESS NOTES
Pt resting quietly in bed, Aceves's Traction remains in place. Denies any pain at this time, states \"I'm still sick to my stomach. \" Pt informed it is not time for nausea medicine yet. Verbalizes understanding. Preop checklist completed. Family visits at bedside. Denies needs. Call light in reach.

## 2018-10-12 NOTE — ANESTHESIA POSTPROCEDURE EVALUATION
Department of Anesthesiology  Postprocedure Note    Patient: Keanu Tilley  MRN: [id number]  YOB: 1945  Date of evaluation: 10/12/2018  Time:  3:18 PM     Procedure Summary     Date:  10/12/18 Room / Location:  [address] [place] [address]    Anesthesia Start:  8729 Anesthesia Stop:  0146    Procedure:  RIGHT HIP INTRAMEDULLAR DARY - Roxana Biomet CM Nail (Right Hip) Diagnosis:  (RIGHT HIP FRACTURE)    Surgeon:  María Arana DO Responsible Provider:  LORI Rush CRNA    Anesthesia Type:  general, regional ASA Status:  3          Anesthesia Type: general, regional    Ivory Phase I:      Ivory Phase II:      Last vitals: Reviewed and per EMR flowsheets.        Anesthesia Post Evaluation    Patient location during evaluation: PACU  Patient participation: complete - patient participated  Level of consciousness: awake and alert  Pain score: 0  Airway patency: patent  Nausea & Vomiting: no nausea and no vomiting  Complications: no  Cardiovascular status: hemodynamically stable  Respiratory status: spontaneous ventilation

## 2018-10-12 NOTE — PLAN OF CARE
Problem: Nutrition  Goal: Optimal nutrition therapy  Outcome: Ongoing  Nutrition Problem: Increased nutrient needs  Intervention: Food and/or Nutrient Delivery: Start oral diet  Nutritional Goals: PO >75% meals with lean protein and adequate fluids  Eval need to supplement post op.

## 2018-10-12 NOTE — ANESTHESIA PRE PROCEDURE
Department of Anesthesiology  Preprocedure Note       Name:  Susanna Talbert   Age:  74 y.o.  :  1945                                          MRN:  [id number]         Date:  10/12/2018      Surgeon: Candance Pottier):  Milagros Hayes DO    Procedure: Procedure(s):  RIGHT HIP INTRAMEDULLAR DARY - AFFIXUS    Medications prior to admission:   Prior to Admission medications    Medication Sig Start Date End Date Taking? Authorizing Provider   traMADol (ULTRAM) 50 MG tablet Take 50 mg by mouth 2 times daily as needed. .   Yes Historical Provider, MD   atorvastatin (LIPITOR) 40 MG tablet Take 40 mg by mouth daily   Yes Historical Provider, MD   potassium chloride (KLOR-CON) 10 MEQ extended release tablet Take 10 mEq by mouth daily   Yes Historical Provider, MD   aspirin (ADAMS ASPIRIN) 325 MG tablet Take 1 tablet by mouth daily. 12/10/14  Yes Servando Andujar DO   enalapril (VASOTEC) 10 MG tablet Take 1 tablet by mouth daily. Patient taking differently: Take 20 mg by mouth daily  14  Yes Ihsan Albrecht MD   ibuprofen (ADVIL;MOTRIN) 200 MG tablet Take 400 mg by mouth every 6 hours as needed.    Yes Historical Provider, MD       Current medications:    Current Facility-Administered Medications   Medication Dose Route Frequency Provider Last Rate Last Dose    [MAR Hold] atorvastatin (LIPITOR) tablet 40 mg  40 mg Oral Daily Calli Cardona MD        [facility] Hold] enalapril (VASOTEC) tablet 20 mg  20 mg Oral Daily Calli Cardona MD        [facility] Hold] sodium chloride flush 0.9 % injection 10 mL  10 mL Intravenous 2 times per day Calli Cardona MD   10 mL at 10/12/18 0751    [MAR Hold] sodium chloride flush 0.9 % injection 10 mL  10 mL Intravenous PRN Calli Cardona MD       [facility] Hold] magnesium hydroxide (MILK OF MAGNESIA) 400 MG/5ML suspension 30 mL  30 mL Oral Daily PRN Calli Cardona MD        [facility] Hold] ondansetron (ZOFRAN) injection 4 mg  4 mg Intravenous Q6H PRN Calli Cardona MD   4 mg at

## 2018-10-12 NOTE — PROGRESS NOTES
Pt relates that she has a \"belly ache\" from the gas in her stomach. Second bath given pre op. Pt tolerates well. Refuses pain med. Tells this nurse it's not that bad.

## 2018-10-13 LAB
HCT VFR BLD CALC: 31.3 % (ref 36–46)
HEMOGLOBIN: 10.3 G/DL (ref 12–16)
MCH RBC QN AUTO: 29.1 PG (ref 26–34)
MCHC RBC AUTO-ENTMCNC: 32.9 G/DL (ref 31–37)
MCV RBC AUTO: 88.6 FL (ref 80–100)
NRBC AUTOMATED: ABNORMAL PER 100 WBC
PDW BLD-RTO: 15.1 % (ref 12.1–15.2)
PLATELET # BLD: 200 K/UL (ref 140–450)
PMV BLD AUTO: ABNORMAL FL (ref 6–12)
RBC # BLD: 3.54 M/UL (ref 4–5.2)
WBC # BLD: 14.1 K/UL (ref 3.5–11)

## 2018-10-13 PROCEDURE — 6370000000 HC RX 637 (ALT 250 FOR IP): Performed by: ORTHOPAEDIC SURGERY

## 2018-10-13 PROCEDURE — 6370000000 HC RX 637 (ALT 250 FOR IP): Performed by: INTERNAL MEDICINE

## 2018-10-13 PROCEDURE — 2580000003 HC RX 258: Performed by: ORTHOPAEDIC SURGERY

## 2018-10-13 PROCEDURE — 1200000000 HC SEMI PRIVATE

## 2018-10-13 PROCEDURE — 85027 COMPLETE CBC AUTOMATED: CPT

## 2018-10-13 PROCEDURE — 94761 N-INVAS EAR/PLS OXIMETRY MLT: CPT

## 2018-10-13 PROCEDURE — 36415 COLL VENOUS BLD VENIPUNCTURE: CPT

## 2018-10-13 PROCEDURE — 97161 PT EVAL LOW COMPLEX 20 MIN: CPT

## 2018-10-13 PROCEDURE — 6360000002 HC RX W HCPCS: Performed by: INTERNAL MEDICINE

## 2018-10-13 PROCEDURE — 6360000002 HC RX W HCPCS: Performed by: ORTHOPAEDIC SURGERY

## 2018-10-13 PROCEDURE — 2500000003 HC RX 250 WO HCPCS: Performed by: ORTHOPAEDIC SURGERY

## 2018-10-13 RX ORDER — BISACODYL 10 MG
10 SUPPOSITORY, RECTAL RECTAL
Status: ACTIVE | OUTPATIENT
Start: 2018-10-13 | End: 2018-10-13

## 2018-10-13 RX ADMIN — ONDANSETRON 4 MG: 2 INJECTION INTRAMUSCULAR; INTRAVENOUS at 16:56

## 2018-10-13 RX ADMIN — ONDANSETRON 4 MG: 2 INJECTION INTRAMUSCULAR; INTRAVENOUS at 09:43

## 2018-10-13 RX ADMIN — DOCUSATE SODIUM 100 MG: 100 CAPSULE, LIQUID FILLED ORAL at 08:22

## 2018-10-13 RX ADMIN — POTASSIUM CHLORIDE 10 MEQ: 750 TABLET, FILM COATED, EXTENDED RELEASE ORAL at 08:30

## 2018-10-13 RX ADMIN — HYDROMORPHONE HYDROCHLORIDE 1 MG: 1 INJECTION, SOLUTION INTRAMUSCULAR; INTRAVENOUS; SUBCUTANEOUS at 13:41

## 2018-10-13 RX ADMIN — SODIUM CHLORIDE: 9 INJECTION, SOLUTION INTRAVENOUS at 09:27

## 2018-10-13 RX ADMIN — ENOXAPARIN SODIUM 40 MG: 40 INJECTION SUBCUTANEOUS at 08:21

## 2018-10-13 RX ADMIN — ATORVASTATIN CALCIUM 40 MG: 20 TABLET, FILM COATED ORAL at 08:21

## 2018-10-13 RX ADMIN — CLINDAMYCIN PHOSPHATE 900 MG: 18 INJECTION, SOLUTION INTRAMUSCULAR; INTRAVENOUS at 05:41

## 2018-10-13 RX ADMIN — DOCUSATE SODIUM 100 MG: 100 CAPSULE, LIQUID FILLED ORAL at 19:54

## 2018-10-13 RX ADMIN — HYDROCODONE BITARTRATE AND ACETAMINOPHEN 2 TABLET: 5; 325 TABLET ORAL at 16:57

## 2018-10-13 RX ADMIN — BISACODYL 10 MG: 5 TABLET, COATED ORAL at 10:31

## 2018-10-13 RX ADMIN — Medication 10 ML: at 19:55

## 2018-10-13 RX ADMIN — HYDROCODONE BITARTRATE AND ACETAMINOPHEN 2 TABLET: 5; 325 TABLET ORAL at 03:07

## 2018-10-13 RX ADMIN — HYDROCODONE BITARTRATE AND ACETAMINOPHEN 2 TABLET: 5; 325 TABLET ORAL at 09:43

## 2018-10-13 RX ADMIN — ONDANSETRON 4 MG: 2 INJECTION INTRAMUSCULAR; INTRAVENOUS at 03:23

## 2018-10-13 RX ADMIN — ENALAPRIL MALEATE 20 MG: 5 TABLET ORAL at 08:22

## 2018-10-13 ASSESSMENT — PAIN DESCRIPTION - ORIENTATION
ORIENTATION: RIGHT
ORIENTATION: RIGHT;ANTERIOR
ORIENTATION: RIGHT
ORIENTATION: RIGHT
ORIENTATION: RIGHT;ANTERIOR
ORIENTATION: RIGHT;ANTERIOR

## 2018-10-13 ASSESSMENT — PAIN SCALES - GENERAL
PAINLEVEL_OUTOF10: 0
PAINLEVEL_OUTOF10: 1
PAINLEVEL_OUTOF10: 3
PAINLEVEL_OUTOF10: 10
PAINLEVEL_OUTOF10: 3
PAINLEVEL_OUTOF10: 5
PAINLEVEL_OUTOF10: 2
PAINLEVEL_OUTOF10: 7
PAINLEVEL_OUTOF10: 6
PAINLEVEL_OUTOF10: 10
PAINLEVEL_OUTOF10: 0
PAINLEVEL_OUTOF10: 10
PAINLEVEL_OUTOF10: 5
PAINLEVEL_OUTOF10: 3
PAINLEVEL_OUTOF10: 4
PAINLEVEL_OUTOF10: 7
PAINLEVEL_OUTOF10: 0

## 2018-10-13 ASSESSMENT — PAIN DESCRIPTION - FREQUENCY
FREQUENCY: CONTINUOUS

## 2018-10-13 ASSESSMENT — PAIN DESCRIPTION - DESCRIPTORS
DESCRIPTORS: ACHING;DULL;HEAVINESS
DESCRIPTORS: DULL;HEAVINESS
DESCRIPTORS: DULL
DESCRIPTORS: ACHING;SHARP;SHOOTING
DESCRIPTORS: DULL;HEAVINESS
DESCRIPTORS: DULL
DESCRIPTORS: JABBING;SHOOTING;STABBING
DESCRIPTORS: ACHING;CRAMPING;HEAVINESS;SHARP;SHOOTING
DESCRIPTORS: DISCOMFORT
DESCRIPTORS: ACHING;CRAMPING

## 2018-10-13 ASSESSMENT — PAIN DESCRIPTION - PAIN TYPE
TYPE: SURGICAL PAIN

## 2018-10-13 ASSESSMENT — PAIN DESCRIPTION - DIRECTION
RADIATING_TOWARDS: DOWN LEG

## 2018-10-13 ASSESSMENT — PAIN DESCRIPTION - PROGRESSION
CLINICAL_PROGRESSION: GRADUALLY IMPROVING
CLINICAL_PROGRESSION: GRADUALLY WORSENING
CLINICAL_PROGRESSION: NOT CHANGED
CLINICAL_PROGRESSION: GRADUALLY IMPROVING
CLINICAL_PROGRESSION: NOT CHANGED
CLINICAL_PROGRESSION: GRADUALLY IMPROVING
CLINICAL_PROGRESSION: GRADUALLY WORSENING
CLINICAL_PROGRESSION: GRADUALLY IMPROVING
CLINICAL_PROGRESSION: NOT CHANGED
CLINICAL_PROGRESSION: NOT CHANGED
CLINICAL_PROGRESSION: GRADUALLY IMPROVING
CLINICAL_PROGRESSION: GRADUALLY IMPROVING
CLINICAL_PROGRESSION: NOT CHANGED
CLINICAL_PROGRESSION: NOT CHANGED
CLINICAL_PROGRESSION: GRADUALLY IMPROVING

## 2018-10-13 ASSESSMENT — PAIN DESCRIPTION - ONSET
ONSET: ON-GOING

## 2018-10-13 ASSESSMENT — PAIN DESCRIPTION - LOCATION
LOCATION: HIP;LEG
LOCATION: LEG
LOCATION: HIP;LEG

## 2018-10-13 NOTE — PROGRESS NOTES
Hospitalist Progress Note  10/13/2018 10:09 AM  Subjective:   Admit Date: 10/11/2018  PCP: Natividad Reid MD    Interval History: [facility] states today she feels better, pain in the right hip improved after surgical repair. States nausea is caused by narcotics, but Zofran helps to offset it. She had some abdominal distention this morning, resolved after small bowel movement. Denies headache, chest pain, shortness of breath. Vitals stable. Labs reviewed. Diet: DIET GENERAL;  Medications:   Scheduled Meds:   bisacodyl  10 mg Oral Once    potassium chloride  10 mEq Oral Daily    sodium chloride flush  10 mL Intravenous 2 times per day    docusate sodium  100 mg Oral BID    enoxaparin  40 mg Subcutaneous Daily    atorvastatin  40 mg Oral Daily    enalapril  20 mg Oral Daily     Continuous Infusions:  PRN Medications: bisacodyl, sodium chloride flush, acetaminophen, ondansetron, HYDROcodone 5 mg - acetaminophen **OR** HYDROcodone 5 mg - acetaminophen, bacitracin, acetaminophen, HYDROmorphone    Objective:   Vitals: /83   Pulse 74   Temp 98.4 °F (36.9 °C) (Oral)   Resp 20   Ht 5' 5\" (1.651 m)   Wt 163 lb 1.6 oz (74 kg)   SpO2 98%   BMI 27.14 kg/m²   BMI: Body mass index is 27.14 kg/m².     CBC:   Recent Labs      10/11/18   1116  10/12/18   0600  10/13/18   0500   WBC  8.8  12.2*  14.1*   HGB  14.3  11.8*  10.3*   PLT  251  243  200     BMP:    Recent Labs      10/11/18   1116  10/12/18   0600   NA  138  137   K  3.3*  4.4   CL  103  105   CO2  23  24   BUN  20  30*   CREATININE  0.74  0.84   GLUCOSE  130*  155*       INR:   Recent Labs      10/11/18   1116   INR  1.1       Physical Exam:    General Appearance: alert and oriented to person, place and time, in no acute distress  Cardiovascular: normal rate, regular rhythm, normal S1 and S2, 2/6 systolic murmur best appreciated in the left second intercostal space , no rubs, clicks, or gallops, distal pulses intact  Pulmonary/Chest: clear to

## 2018-10-13 NOTE — OP NOTE
[place] [address]                               OPERATIVE REPORT    PATIENT NAME: Karen Chauhan                      :         1945  MED REC NO:   [id number]                              ROOM:       6557  ACCOUNT NO:   [de-identified]                           ADMIT DATE:  10/11/2018  PROVIDER:     Eliud Pastrana Pocos    DATE OF PROCEDURE:  10/12/2018    PREOPERATIVE DIAGNOSIS:  Right hip intertrochanteric/subtrochanteric  fracture, pathologic in nature secondary to osteopenia, likely  clinical osteoporosis. POSTOPERATIVE DIAGNOSIS:  Right hip intertrochanteric/subtrochanteric  fracture, pathologic in nature secondary to osteopenia, likely  clinical osteoporosis. OPERATION PERFORMED:  Right hip intramedullary niru. SURGEON:  Eliud Negro. Pocos    ANESTHESIA:  [place]eter with regional block, general.    ESTIMATED BLOOD LOSS:  50 mL. INTRAVENOUS FLUIDS:  Please see the operative records. URINARY OUTPUT:  Please see the operative records. SPECIMEN:  None. IMPLANT:  Roxana cephalomedullary nail, this is the short nail 130  degree x 13 mm. COMPLICATIONS:  None. DRAINS:  None. HISTORY/OPERATIVE INDICATIONS:  The patient is a 42-year-old white  female who presents after sustaining a fall yesterday. She did get  tangled up apparently with her puppy. She was seen and evaluated in  the emergency department setting where she was found to have a  fracture of the left hip, orthopedics and myself consulted. The  patient was seen and evaluated. Recommendation for the above  procedure was made and the procedure is undertaken this day. The  patient did have preoperative risk assessment and cardiology in the  interim. Intraoperative pathology upon placement of the patient on  the fracture table, adequate reduction of the fracture pattern is  realized with the C-arm intensifier.   The hip is

## 2018-10-13 NOTE — PROGRESS NOTES
Supine to Sit: Moderate assistance, Maximal assistance (2 assist for right LE and upper body management)  Sit to Stand: Moderate Assistance ( 2 assist; less assist from higher surfaces)  Stand to sit: Minimal Assistance (x1 for safety)  Bed to Chair: Minimal assistance, Moderate assistance (x2 for safety; patient able to maintain TTWB RLE)              Balance  Sitting - Static: Good  Sitting - Dynamic: Fair, +  Standing - Static: Fair    Assessment  Activity Tolerance: Patient limited by pain, Patient Tolerated treatment well   Body structures, Functions, Activity limitations: Decreased functional mobility , Decreased ADL status, Decreased strength, Decreased balance  Chart Reviewed: Yes  Assessment: Patient underwent ORIF right LE with a significant decline in fucntional mobility due to TTWB status, pain, and weakness. Patient requires 2 assist for basic  mobility bed to chair. She also requries assist with self-care tasks following her recent surgery. Patient will  require additional skilled therapy to address fucntional mobility deficits however due to her TTWB status for up to possibly 6 weeks she would require assist at home or care in an extended facility until wt bearing increases.   She also has a long standing left foot drop which requires bracing which will affect mobility due to weight bearing/ use of left LE needed due to right hip fracture  Prognosis: Good  Discharge Recommendations: Continue to assess pending progress     Type of devices: Call light within reach, Gait belt, Left in chair     Plan  Times per week: Daily  Times per day:  (1-2x oer day;  1x Sat/Sunday)  Current Treatment Recommendations: Strengthening, Functional Mobility Training, Gait Training, Stair training, Patient/Caregiver Education & Training    Goals  Short term goals  Time Frame for Short term goals: 5 days  Short term goal 1: Transfer supine to sit with min/mod assist x 1 for right LE management  Short term goal 2:

## 2018-10-14 LAB
ABSOLUTE EOS #: 0.2 K/UL (ref 0–0.4)
ABSOLUTE IMMATURE GRANULOCYTE: ABNORMAL K/UL (ref 0–0.3)
ABSOLUTE LYMPH #: 2.1 K/UL (ref 1–4.8)
ABSOLUTE MONO #: 1.5 K/UL (ref 0–1)
ANION GAP SERPL CALCULATED.3IONS-SCNC: 7 MMOL/L (ref 9–17)
BASOPHILS # BLD: 0 % (ref 0–2)
BASOPHILS ABSOLUTE: 0.1 K/UL (ref 0–0.2)
BUN BLDV-MCNC: 33 MG/DL (ref 8–23)
BUN/CREAT BLD: 48 (ref 9–20)
CALCIUM SERPL-MCNC: 8.3 MG/DL (ref 8.6–10.4)
CHLORIDE BLD-SCNC: 109 MMOL/L (ref 98–107)
CO2: 23 MMOL/L (ref 20–31)
CREAT SERPL-MCNC: 0.69 MG/DL (ref 0.5–0.9)
DIFFERENTIAL TYPE: YES
EOSINOPHILS RELATIVE PERCENT: 2 % (ref 0–5)
GFR AFRICAN AMERICAN: >60 ML/MIN
GFR NON-AFRICAN AMERICAN: >60 ML/MIN
GFR SERPL CREATININE-BSD FRML MDRD: ABNORMAL ML/MIN/{1.73_M2}
GFR SERPL CREATININE-BSD FRML MDRD: ABNORMAL ML/MIN/{1.73_M2}
GLUCOSE BLD-MCNC: 112 MG/DL (ref 70–99)
HCT VFR BLD CALC: 29.6 % (ref 36–46)
HEMOGLOBIN: 9.7 G/DL (ref 12–16)
IMMATURE GRANULOCYTES: ABNORMAL %
LYMPHOCYTES # BLD: 17 % (ref 15–40)
MCH RBC QN AUTO: 29 PG (ref 26–34)
MCHC RBC AUTO-ENTMCNC: 32.7 G/DL (ref 31–37)
MCV RBC AUTO: 88.7 FL (ref 80–100)
MONOCYTES # BLD: 13 % (ref 4–8)
NRBC AUTOMATED: ABNORMAL PER 100 WBC
PDW BLD-RTO: 15.5 % (ref 12.1–15.2)
PLATELET # BLD: 199 K/UL (ref 140–450)
PLATELET ESTIMATE: ABNORMAL
PMV BLD AUTO: ABNORMAL FL (ref 6–12)
POTASSIUM SERPL-SCNC: 4 MMOL/L (ref 3.7–5.3)
RBC # BLD: 3.34 M/UL (ref 4–5.2)
RBC # BLD: ABNORMAL 10*6/UL
SEG NEUTROPHILS: 68 % (ref 47–75)
SEGMENTED NEUTROPHILS ABSOLUTE COUNT: 8.2 K/UL (ref 2.5–7)
SODIUM BLD-SCNC: 139 MMOL/L (ref 135–144)
WBC # BLD: 12.1 K/UL (ref 3.5–11)
WBC # BLD: ABNORMAL 10*3/UL

## 2018-10-14 PROCEDURE — 6360000002 HC RX W HCPCS: Performed by: ORTHOPAEDIC SURGERY

## 2018-10-14 PROCEDURE — 6370000000 HC RX 637 (ALT 250 FOR IP): Performed by: INTERNAL MEDICINE

## 2018-10-14 PROCEDURE — 94761 N-INVAS EAR/PLS OXIMETRY MLT: CPT

## 2018-10-14 PROCEDURE — 1200000000 HC SEMI PRIVATE

## 2018-10-14 PROCEDURE — 6360000002 HC RX W HCPCS: Performed by: INTERNAL MEDICINE

## 2018-10-14 PROCEDURE — 97110 THERAPEUTIC EXERCISES: CPT

## 2018-10-14 PROCEDURE — 97530 THERAPEUTIC ACTIVITIES: CPT

## 2018-10-14 PROCEDURE — 6370000000 HC RX 637 (ALT 250 FOR IP): Performed by: ORTHOPAEDIC SURGERY

## 2018-10-14 PROCEDURE — 36415 COLL VENOUS BLD VENIPUNCTURE: CPT

## 2018-10-14 PROCEDURE — 85025 COMPLETE CBC W/AUTO DIFF WBC: CPT

## 2018-10-14 PROCEDURE — 80048 BASIC METABOLIC PNL TOTAL CA: CPT

## 2018-10-14 PROCEDURE — 2580000003 HC RX 258: Performed by: ORTHOPAEDIC SURGERY

## 2018-10-14 RX ADMIN — ONDANSETRON 4 MG: 2 INJECTION INTRAMUSCULAR; INTRAVENOUS at 16:49

## 2018-10-14 RX ADMIN — Medication 10 ML: at 07:55

## 2018-10-14 RX ADMIN — ONDANSETRON 4 MG: 2 INJECTION INTRAMUSCULAR; INTRAVENOUS at 10:54

## 2018-10-14 RX ADMIN — HYDROCODONE BITARTRATE AND ACETAMINOPHEN 2 TABLET: 5; 325 TABLET ORAL at 16:48

## 2018-10-14 RX ADMIN — ENOXAPARIN SODIUM 40 MG: 40 INJECTION SUBCUTANEOUS at 07:52

## 2018-10-14 RX ADMIN — Medication 10 ML: at 16:49

## 2018-10-14 RX ADMIN — Medication 10 ML: at 07:31

## 2018-10-14 RX ADMIN — HYDROCODONE BITARTRATE AND ACETAMINOPHEN 2 TABLET: 5; 325 TABLET ORAL at 21:19

## 2018-10-14 RX ADMIN — Medication 10 ML: at 21:19

## 2018-10-14 RX ADMIN — HYDROCODONE BITARTRATE AND ACETAMINOPHEN 2 TABLET: 5; 325 TABLET ORAL at 10:54

## 2018-10-14 RX ADMIN — DOCUSATE SODIUM 100 MG: 100 CAPSULE, LIQUID FILLED ORAL at 07:52

## 2018-10-14 RX ADMIN — ATORVASTATIN CALCIUM 40 MG: 20 TABLET, FILM COATED ORAL at 07:51

## 2018-10-14 RX ADMIN — ENALAPRIL MALEATE 20 MG: 5 TABLET ORAL at 07:51

## 2018-10-14 RX ADMIN — ONDANSETRON 4 MG: 2 INJECTION INTRAMUSCULAR; INTRAVENOUS at 03:41

## 2018-10-14 RX ADMIN — Medication 10 ML: at 10:53

## 2018-10-14 RX ADMIN — HYDROCODONE BITARTRATE AND ACETAMINOPHEN 2 TABLET: 5; 325 TABLET ORAL at 03:41

## 2018-10-14 RX ADMIN — POTASSIUM CHLORIDE 10 MEQ: 750 TABLET, FILM COATED, EXTENDED RELEASE ORAL at 07:54

## 2018-10-14 RX ADMIN — HYDROMORPHONE HYDROCHLORIDE 1 MG: 1 INJECTION, SOLUTION INTRAMUSCULAR; INTRAVENOUS; SUBCUTANEOUS at 07:30

## 2018-10-14 RX ADMIN — Medication 10 ML: at 07:54

## 2018-10-14 ASSESSMENT — PAIN SCALES - GENERAL
PAINLEVEL_OUTOF10: 0
PAINLEVEL_OUTOF10: 7
PAINLEVEL_OUTOF10: 0
PAINLEVEL_OUTOF10: 3
PAINLEVEL_OUTOF10: 7
PAINLEVEL_OUTOF10: 5
PAINLEVEL_OUTOF10: 10
PAINLEVEL_OUTOF10: 4
PAINLEVEL_OUTOF10: 3
PAINLEVEL_OUTOF10: 7

## 2018-10-14 ASSESSMENT — PAIN DESCRIPTION - ONSET
ONSET: ON-GOING

## 2018-10-14 ASSESSMENT — PAIN DESCRIPTION - FREQUENCY
FREQUENCY: CONTINUOUS
FREQUENCY: OTHER (COMMENT)
FREQUENCY: CONTINUOUS

## 2018-10-14 ASSESSMENT — PAIN DESCRIPTION - DESCRIPTORS
DESCRIPTORS: DULL
DESCRIPTORS: DULL
DESCRIPTORS: ACHING;HEAVINESS;SHARP;SHOOTING
DESCRIPTORS: DULL
DESCRIPTORS: DULL
DESCRIPTORS: ACHING;SHOOTING

## 2018-10-14 ASSESSMENT — PAIN DESCRIPTION - PROGRESSION
CLINICAL_PROGRESSION: GRADUALLY IMPROVING
CLINICAL_PROGRESSION: NOT CHANGED
CLINICAL_PROGRESSION: GRADUALLY IMPROVING
CLINICAL_PROGRESSION: GRADUALLY WORSENING
CLINICAL_PROGRESSION: GRADUALLY WORSENING
CLINICAL_PROGRESSION: GRADUALLY IMPROVING
CLINICAL_PROGRESSION: GRADUALLY IMPROVING
CLINICAL_PROGRESSION: GRADUALLY WORSENING
CLINICAL_PROGRESSION: GRADUALLY WORSENING
CLINICAL_PROGRESSION: GRADUALLY IMPROVING
CLINICAL_PROGRESSION: GRADUALLY WORSENING
CLINICAL_PROGRESSION: OTHER (COMMENT)
CLINICAL_PROGRESSION: OTHER (COMMENT)
CLINICAL_PROGRESSION: GRADUALLY IMPROVING
CLINICAL_PROGRESSION: NOT CHANGED
CLINICAL_PROGRESSION: GRADUALLY IMPROVING
CLINICAL_PROGRESSION: GRADUALLY WORSENING
CLINICAL_PROGRESSION: GRADUALLY IMPROVING

## 2018-10-14 ASSESSMENT — PAIN DESCRIPTION - LOCATION
LOCATION: LEG

## 2018-10-14 ASSESSMENT — PAIN DESCRIPTION - PAIN TYPE
TYPE: SURGICAL PAIN

## 2018-10-14 ASSESSMENT — PAIN DESCRIPTION - ORIENTATION
ORIENTATION: RIGHT

## 2018-10-14 NOTE — PROGRESS NOTES
stand with min assist  Short term goal 3: Transfer bed to chair with walker TTWB RLE and min assist x 1    Long Term Goals  Time Frame for Long term goals : 14 days  Long term goal 1: Transfer supine to sit with CGA x 1  Long term goal 2: Transfer sit to stand with CG/min assist x 1  Long term goal 3: Ambulate with walker 10 ft to bathroom to complete self-care tasks with CGa x 1    [facility] License Number: [id number]    Date: 10/14/2018

## 2018-10-15 LAB
-: ABNORMAL
ABSOLUTE EOS #: 0.3 K/UL (ref 0–0.4)
ABSOLUTE IMMATURE GRANULOCYTE: ABNORMAL K/UL (ref 0–0.3)
ABSOLUTE LYMPH #: 1.9 K/UL (ref 1–4.8)
ABSOLUTE MONO #: 1.1 K/UL (ref 0–1)
AMORPHOUS: ABNORMAL
ANION GAP SERPL CALCULATED.3IONS-SCNC: 6 MMOL/L (ref 9–17)
BACTERIA: ABNORMAL
BASOPHILS # BLD: 0 % (ref 0–2)
BASOPHILS ABSOLUTE: 0 K/UL (ref 0–0.2)
BILIRUBIN URINE: NEGATIVE
BUN BLDV-MCNC: 23 MG/DL (ref 8–23)
BUN/CREAT BLD: 42 (ref 9–20)
CALCIUM SERPL-MCNC: 8 MG/DL (ref 8.6–10.4)
CASTS UA: ABNORMAL /LPF
CHLORIDE BLD-SCNC: 107 MMOL/L (ref 98–107)
CO2: 24 MMOL/L (ref 20–31)
COLOR: YELLOW
COMMENT UA: ABNORMAL
CREAT SERPL-MCNC: 0.55 MG/DL (ref 0.5–0.9)
CRYSTALS, UA: ABNORMAL /HPF
DIFFERENTIAL TYPE: YES
EOSINOPHILS RELATIVE PERCENT: 3 % (ref 0–5)
EPITHELIAL CELLS UA: ABNORMAL /HPF
GFR AFRICAN AMERICAN: >60 ML/MIN
GFR NON-AFRICAN AMERICAN: >60 ML/MIN
GFR SERPL CREATININE-BSD FRML MDRD: ABNORMAL ML/MIN/{1.73_M2}
GFR SERPL CREATININE-BSD FRML MDRD: ABNORMAL ML/MIN/{1.73_M2}
GLUCOSE BLD-MCNC: 115 MG/DL (ref 70–99)
GLUCOSE URINE: NEGATIVE
HCT VFR BLD CALC: 28.7 % (ref 36–46)
HEMOGLOBIN: 9.4 G/DL (ref 12–16)
IMMATURE GRANULOCYTES: ABNORMAL %
KETONES, URINE: NEGATIVE
LEUKOCYTE ESTERASE, URINE: ABNORMAL
LYMPHOCYTES # BLD: 17 % (ref 15–40)
MCH RBC QN AUTO: 29 PG (ref 26–34)
MCHC RBC AUTO-ENTMCNC: 32.8 G/DL (ref 31–37)
MCV RBC AUTO: 88.7 FL (ref 80–100)
MONOCYTES # BLD: 10 % (ref 4–8)
MUCUS: ABNORMAL
NITRITE, URINE: POSITIVE
NRBC AUTOMATED: ABNORMAL PER 100 WBC
OTHER OBSERVATIONS UA: ABNORMAL
PDW BLD-RTO: 15.3 % (ref 12.1–15.2)
PH UA: 6 (ref 5–8)
PLATELET # BLD: 199 K/UL (ref 140–450)
PLATELET ESTIMATE: ABNORMAL
PMV BLD AUTO: ABNORMAL FL (ref 6–12)
POTASSIUM SERPL-SCNC: 4 MMOL/L (ref 3.7–5.3)
PROTEIN UA: ABNORMAL
RBC # BLD: 3.24 M/UL (ref 4–5.2)
RBC # BLD: ABNORMAL 10*6/UL
RBC UA: ABNORMAL /HPF (ref 0–2)
RENAL EPITHELIAL, UA: ABNORMAL /HPF
SEG NEUTROPHILS: 70 % (ref 47–75)
SEGMENTED NEUTROPHILS ABSOLUTE COUNT: 7.9 K/UL (ref 2.5–7)
SODIUM BLD-SCNC: 137 MMOL/L (ref 135–144)
SPECIFIC GRAVITY UA: 1.02 (ref 1–1.03)
TRICHOMONAS: ABNORMAL
TURBIDITY: ABNORMAL
URINE HGB: ABNORMAL
UROBILINOGEN, URINE: NORMAL
WBC # BLD: 11.3 K/UL (ref 3.5–11)
WBC # BLD: ABNORMAL 10*3/UL
WBC UA: ABNORMAL /HPF
YEAST: ABNORMAL

## 2018-10-15 PROCEDURE — 87086 URINE CULTURE/COLONY COUNT: CPT

## 2018-10-15 PROCEDURE — 97530 THERAPEUTIC ACTIVITIES: CPT

## 2018-10-15 PROCEDURE — 6370000000 HC RX 637 (ALT 250 FOR IP): Performed by: ORTHOPAEDIC SURGERY

## 2018-10-15 PROCEDURE — 2580000003 HC RX 258: Performed by: ORTHOPAEDIC SURGERY

## 2018-10-15 PROCEDURE — 6370000000 HC RX 637 (ALT 250 FOR IP): Performed by: INTERNAL MEDICINE

## 2018-10-15 PROCEDURE — 51798 US URINE CAPACITY MEASURE: CPT

## 2018-10-15 PROCEDURE — 80048 BASIC METABOLIC PNL TOTAL CA: CPT

## 2018-10-15 PROCEDURE — 6360000002 HC RX W HCPCS: Performed by: ORTHOPAEDIC SURGERY

## 2018-10-15 PROCEDURE — 87186 SC STD MICRODIL/AGAR DIL: CPT

## 2018-10-15 PROCEDURE — 94761 N-INVAS EAR/PLS OXIMETRY MLT: CPT

## 2018-10-15 PROCEDURE — 85025 COMPLETE CBC W/AUTO DIFF WBC: CPT

## 2018-10-15 PROCEDURE — 6360000002 HC RX W HCPCS: Performed by: INTERNAL MEDICINE

## 2018-10-15 PROCEDURE — 81001 URINALYSIS AUTO W/SCOPE: CPT

## 2018-10-15 PROCEDURE — 36415 COLL VENOUS BLD VENIPUNCTURE: CPT

## 2018-10-15 PROCEDURE — 1200000000 HC SEMI PRIVATE

## 2018-10-15 PROCEDURE — 87088 URINE BACTERIA CULTURE: CPT

## 2018-10-15 PROCEDURE — 97166 OT EVAL MOD COMPLEX 45 MIN: CPT

## 2018-10-15 RX ORDER — CIPROFLOXACIN 2 MG/ML
400 INJECTION, SOLUTION INTRAVENOUS EVERY 12 HOURS
Status: DISCONTINUED | OUTPATIENT
Start: 2018-10-15 | End: 2018-10-21

## 2018-10-15 RX ADMIN — CIPROFLOXACIN 400 MG: 2 INJECTION, SOLUTION INTRAVENOUS at 22:07

## 2018-10-15 RX ADMIN — POTASSIUM CHLORIDE 10 MEQ: 750 TABLET, FILM COATED, EXTENDED RELEASE ORAL at 08:21

## 2018-10-15 RX ADMIN — Medication 10 ML: at 22:07

## 2018-10-15 RX ADMIN — Medication 10 ML: at 09:01

## 2018-10-15 RX ADMIN — HYDROCODONE BITARTRATE AND ACETAMINOPHEN 2 TABLET: 5; 325 TABLET ORAL at 04:10

## 2018-10-15 RX ADMIN — ENOXAPARIN SODIUM 40 MG: 40 INJECTION SUBCUTANEOUS at 08:21

## 2018-10-15 RX ADMIN — HYDROCODONE BITARTRATE AND ACETAMINOPHEN 2 TABLET: 5; 325 TABLET ORAL at 17:16

## 2018-10-15 RX ADMIN — HYDROCODONE BITARTRATE AND ACETAMINOPHEN 2 TABLET: 5; 325 TABLET ORAL at 08:21

## 2018-10-15 RX ADMIN — Medication 10 ML: at 08:21

## 2018-10-15 RX ADMIN — HYDROCODONE BITARTRATE AND ACETAMINOPHEN 2 TABLET: 5; 325 TABLET ORAL at 12:42

## 2018-10-15 RX ADMIN — DOCUSATE SODIUM 100 MG: 100 CAPSULE, LIQUID FILLED ORAL at 08:21

## 2018-10-15 RX ADMIN — ENALAPRIL MALEATE 20 MG: 5 TABLET ORAL at 08:20

## 2018-10-15 RX ADMIN — ATORVASTATIN CALCIUM 40 MG: 20 TABLET, FILM COATED ORAL at 08:21

## 2018-10-15 RX ADMIN — CIPROFLOXACIN 400 MG: 2 INJECTION, SOLUTION INTRAVENOUS at 11:15

## 2018-10-15 RX ADMIN — HYDROCODONE BITARTRATE AND ACETAMINOPHEN 2 TABLET: 5; 325 TABLET ORAL at 23:12

## 2018-10-15 ASSESSMENT — PAIN SCALES - GENERAL
PAINLEVEL_OUTOF10: 8
PAINLEVEL_OUTOF10: 5
PAINLEVEL_OUTOF10: 5
PAINLEVEL_OUTOF10: 6
PAINLEVEL_OUTOF10: 4
PAINLEVEL_OUTOF10: 4
PAINLEVEL_OUTOF10: 10
PAINLEVEL_OUTOF10: 6
PAINLEVEL_OUTOF10: 6
PAINLEVEL_OUTOF10: 10
PAINLEVEL_OUTOF10: 7
PAINLEVEL_OUTOF10: 7

## 2018-10-15 ASSESSMENT — PAIN DESCRIPTION - DESCRIPTORS
DESCRIPTORS: ACHING
DESCRIPTORS: DULL
DESCRIPTORS: ACHING;JABBING
DESCRIPTORS: ACHING
DESCRIPTORS: ACHING

## 2018-10-15 ASSESSMENT — PAIN DESCRIPTION - LOCATION
LOCATION: LEG

## 2018-10-15 ASSESSMENT — PAIN DESCRIPTION - PAIN TYPE
TYPE: SURGICAL PAIN

## 2018-10-15 ASSESSMENT — PAIN DESCRIPTION - ONSET
ONSET: ON-GOING
ONSET: ON-GOING

## 2018-10-15 ASSESSMENT — PAIN DESCRIPTION - ORIENTATION
ORIENTATION: RIGHT

## 2018-10-15 ASSESSMENT — PAIN DESCRIPTION - FREQUENCY
FREQUENCY: CONTINUOUS

## 2018-10-15 ASSESSMENT — PAIN DESCRIPTION - PROGRESSION: CLINICAL_PROGRESSION: NOT CHANGED

## 2018-10-15 NOTE — PROGRESS NOTES
[facility]  Evaluation  Date: 10/15/2018  Patient Name: Diogenes Triplett        MRN: [id number]    : 1945  (74 y.o.)  Gender: female   Referring Practitioner: Dr. Shea Murry   Diagnosis: R hip ORIF   Additional Pertinent Hx:  Patient admitted after tripping/falling at home landing on her right hip sustaining an intertrochanteric fracture. Underwent ORIF/IM jordan 10/12/18. Foot drop on the non affected leg due to compression of the spinal cord.    Past Medical History:   Diagnosis Date    Dropfoot     left foot    Foot drop     left     Hypertension      Past Surgical History:   Procedure Laterality Date    ANKLE SURGERY      right    APPENDECTOMY      CATARACT REMOVAL      FRACTURE SURGERY      Pt has a plate and 8 screws in rt ankle    HIP FRACTURE SURGERY Right 10/12/2018    Intramedullary Jordan placement Roxana Biomet    HYSTERECTOMY      WRIST FRACTURE SURGERY Left        Other position/activity restrictions: TTWB RLE        Subjective  Subjective: Patient is seated in bedside chair upon arrival.   Pain Level: 10  Pain Location: Leg  Pain Orientation: Right  Orientation  Overall Orientation Status: Within Normal Limits  Vision  Vision: Within Functional Limits  Hearing  Hearing: Within functional limits  Social/Functional History  Lives With: Alone  Type of Home: Mobile home  Home Layout: One level  Home Access: Stairs to enter with rails  Entrance Stairs - Number of Steps: 3  Entrance Stairs - Rails: Right  Bathroom Shower/Tub: Walk-in shower, Tub/Shower unit  Bathroom Toilet: Standard  Home Equipment: Cane  ADL Assistance: Independent  Homemaking Assistance: Independent  Ambulation Assistance: Independent (no device )  Transfer Assistance: Independent  Active : Yes  Prior Function  Lives With: Alone  ADL Assistance: Independent  Homemaking Assistance: Independent  Ambulation Assistance: Independent (no device )  Transfer Assistance: Independent    Objective

## 2018-10-15 NOTE — PROGRESS NOTES
Hospitalist Progress Note  10/15/2018 5:56 AM  Subjective:   Admit Date: 10/11/2018  PCP: Bethany Fuentes MD    Interval History: Nicki Roland has not complaints this am.  She feels her pain is controlled on the current pain medication. She moved her bowels yesterday. No chest pain or SOB. Appetite is good, no nausea. She states she is not to optimistic about getting back on her feet secondary to her foot drop on the left. DC planning was discussed. She is not opposed going to an ECF if this is covered by her insurance as she lives alone and won't be able to care for herself. Diet: DIET GENERAL;  Medications:   Scheduled Meds:   potassium chloride  10 mEq Oral Daily    sodium chloride flush  10 mL Intravenous 2 times per day    docusate sodium  100 mg Oral BID    enoxaparin  40 mg Subcutaneous Daily    atorvastatin  40 mg Oral Daily    enalapril  20 mg Oral Daily     Continuous Infusions:  CBC:   Recent Labs      10/13/18   0500  10/14/18   0520  10/15/18   0520   WBC  14.1*  12.1*  11.3*   HGB  10.3*  9.7*  9.4*   PLT  200  199  199     BMP:    Recent Labs      10/12/18   0600  10/14/18   0520  10/15/18   0520   NA  137  139  137   K  4.4  4.0  4.0   CL  105  109*  107   CO2  24  23  24   BUN  30*  33*  23   CREATININE  0.84  0.69  0.55   GLUCOSE  155*  112*  115*     Hepatic: No results for input(s): AST, ALT, ALB, BILITOT, ALKPHOS in the last 72 hours. Troponin: No results for input(s): TROPONINI in the last 72 hours. BNP: No results for input(s): BNP in the last 72 hours. Lipids: No results for input(s): CHOL, HDL in the last 72 hours. Invalid input(s): LDLCALCU  INR: No results for input(s): INR in the last 72 hours.       Objective:   Vitals: BP (!) 151/73   Pulse 85   Temp 98.7 °F (37.1 °C) (Oral)   Resp 18   Ht 5' 5\" (1.651 m)   Wt 163 lb 1.6 oz (74 kg)   SpO2 95%   BMI 27.14 kg/m²   General appearance: alert and cooperative with exam  HEENT: Head: Normocephalic, no lesions, without

## 2018-10-15 NOTE — PROGRESS NOTES
Phone: Bryan  Date: 10/15/2018  Fax: [phone]      Physical Therapy    Daily Note    Patient Name: Karri Scott      : 1945  (74 y.o.)  MRN: [id number]     [x] Patient requires additional Physical Therapy    [] Anticipate Physical Therapy Discharge Soon     Assessment   Sit to Supine: Moderate assistance (x2)    Sit to Stand: Moderate Assistance (x2)  Stand to sit: Minimal Assistance (x2)  Bed to Chair: Minimal assistance, Moderate assistance (x2)  Stand Pivot Transfers: Moderate Assistance (x2)                        Assessment: Patient seated up in chair upon entry to room. States she is not feeling well and is ready to return to bed after she sits on bedside commode. Sit to stand from chair is mod assist x 2. Needs cues to push up from chair and then to stand up straight. Stand pivot transfer to bedside commode with mod assist x 2 with wheeled walker. She has much difficulty maintaining TTWB on R LE and also moving both feet. When finished, she competes sit to stand transfer from bedside commode with mod assist x 2.-3. Needs verbal cues to grab onto walker once she is standing and to let go of commode. .  Verneita Ban standing balance to have her bottom cleaned. Commode is moved out from behind patient and the bed is slid up behind her. She has difficulty taking a couple steps back to bed. She is placed at edge of bed and requries mod assist for sit to supine. No further threapy at this time due to her not feeling well. Progress as able and call light in reach.   Discharge Recommendations: Continue to assess pending progress  Safety Devices  Type of devices: Call light within reach, Gait belt, Left in bed, Nurse notified          Time In: 7168  Time Out:1433  Timed Coded Minutes: 21  Total Treatment Time:21        Plan  Cont Per Plan Of Care    Goals  Short Term Goals  Time Frame for Short term goals: 5 days  Short term goal 1: Transfer supine to sit with min/mod assist x 1 for right LE management  Short term goal 2: Transfer sit to stand with min assist  Short term goal 3: Transfer bed to chair with walker TTWB RLE and min assist x 1          Long Term Goals  Time Frame for Long term goals : 14 days  Long term goal 1: Transfer supine to sit with CGA x 1  Long term goal 2: Transfer sit to stand with CG/min assist x 1  Long term goal 3: Ambulate with walker 10 ft to bathroom to complete self-care tasks with CGa x 1          [id number] [place] [address] [facility] License Number: [id number]    Date: 10/15/2018

## 2018-10-15 NOTE — PLAN OF CARE
[facility]    Inpatient Occupational Therapy  Plan of Care  OT Orders Received and Evaluation Complete  Date: 10/15/2018  Patient Name: Tiffany Camarena        MRN: [id number]    : 1945  (74 y.o.)  Referring Practitioner: Dr. Mehdi Giron   Onset Date: 10/11/18  Referral Date: 10/12/18  Diagnosis: R hip ORIF   Treatment Diagnosis: weakness    Identified Problem Areas  Performance deficits / Impairments: Decreased functional mobility , Decreased ADL status, Decreased strength, Decreased high-level IADLs, Decreased coordination, Decreased endurance  Assessment: Patient is seated in bedside chair upon arrival.  Transfers to bedside commode with mod-max assist x2 and maximum verbal cueing. Patient required max assist for toilet hygiene. Difficulty taking any steps or hoping, completed stand pivot. Complained of pain and spasms when in standing. Required mod assist x2 for all bed mobility as well. Remains supine in bed with call light in reach.    Prognosis: Good     Justification for Skilled Services:  [x]  Complete Daily Tasks Safely  [x] Improve Balance   [x]  Return to Prior Level of Function  []  Family/Caregiver Education    [x] Improve UE strength  [x] Patient Education: [x]Adaptive Equipment   [x]Home Exercise Program and Progression    Treatment Plan  Plan  Times per week: 5x  Times per day: Daily (1-2x day)  Plan weeks: 1  [] Modalities:  [x] Therapeutic Exercise   [x] Therapeutic Activity  [] Splinting:     [] Home Safety Evaluation         [x] ADL Retraining                       [] Muscle Re-education [] Cognitive Retraining            [] Sensory Integration  [x] Patient Education [x] Home Exercise Program [] Fine Motor Coordination  Discharge Recommendations: ECF with OT    GOALS  Short term goals  Time Frame for Short term goals: 5 days (18)  Short term goal 1: Patient to transfer from bed to bedside commode with min assist x1  Short term goal 2: Patient to tolerate 15 minutes BUE

## 2018-10-15 NOTE — PROGRESS NOTES
Pt awake in bed with granddaughter at bedside. Pt expresses feelings about \"feeling like shit from all the pain since surgery. \" Pt also states \"this just sucks. \" Pt comforted. Assessment and vitals complete. Pt complains of 4/10 right hip pain and is repositioned for comfort - states pain relief. Gibson draining param urine. Dressings to right hip intact with small amount of drainage noted to the proximal dressing. Pt denies any needs. Call light in reach.

## 2018-10-15 NOTE — PROGRESS NOTES
term goal 2: Transfer sit to stand with min assist  Short term goal 3: Transfer bed to chair with walker TTWB RLE and min assist x 1          Long Term Goals  Time Frame for Long term goals : 14 days  Long term goal 1: Transfer supine to sit with CGA x 1  Long term goal 2: Transfer sit to stand with CG/min assist x 1  Long term goal 3: Ambulate with walker 10 ft to bathroom to complete self-care tasks with CGa x 1          [address] [address] Therapy License Number: [id number]    Date: 10/15/2018

## 2018-10-15 NOTE — PROGRESS NOTES
[facility]  Occupational Therapy    Date: 10/15/2018  Patient Name: Shawn Paredes        : 1945       [x] Pt Refusal       Patient reports she is in too much pain at this time. Will complete OT evaluation after lunch .      [] Pt Unavailable due to:          Mariella Gowers, OTR/L Date: 10/15/2018

## 2018-10-16 LAB
CULTURE: ABNORMAL
Lab: ABNORMAL
ORGANISM: ABNORMAL
SPECIMEN DESCRIPTION: ABNORMAL
STATUS: ABNORMAL

## 2018-10-16 PROCEDURE — 6370000000 HC RX 637 (ALT 250 FOR IP): Performed by: INTERNAL MEDICINE

## 2018-10-16 PROCEDURE — 6360000002 HC RX W HCPCS: Performed by: INTERNAL MEDICINE

## 2018-10-16 PROCEDURE — 2580000003 HC RX 258: Performed by: ORTHOPAEDIC SURGERY

## 2018-10-16 PROCEDURE — 1200000000 HC SEMI PRIVATE

## 2018-10-16 PROCEDURE — 51798 US URINE CAPACITY MEASURE: CPT

## 2018-10-16 PROCEDURE — 94761 N-INVAS EAR/PLS OXIMETRY MLT: CPT

## 2018-10-16 PROCEDURE — 97110 THERAPEUTIC EXERCISES: CPT

## 2018-10-16 PROCEDURE — 6360000002 HC RX W HCPCS: Performed by: ORTHOPAEDIC SURGERY

## 2018-10-16 PROCEDURE — 51702 INSERT TEMP BLADDER CATH: CPT

## 2018-10-16 PROCEDURE — 97530 THERAPEUTIC ACTIVITIES: CPT

## 2018-10-16 PROCEDURE — 6370000000 HC RX 637 (ALT 250 FOR IP): Performed by: ORTHOPAEDIC SURGERY

## 2018-10-16 RX ORDER — HYDRALAZINE HYDROCHLORIDE 20 MG/ML
10 INJECTION INTRAMUSCULAR; INTRAVENOUS EVERY 6 HOURS PRN
Status: DISCONTINUED | OUTPATIENT
Start: 2018-10-16 | End: 2018-10-22 | Stop reason: HOSPADM

## 2018-10-16 RX ORDER — ZOLPIDEM TARTRATE 5 MG/1
5 TABLET ORAL NIGHTLY PRN
Status: DISCONTINUED | OUTPATIENT
Start: 2018-10-16 | End: 2018-10-22 | Stop reason: HOSPADM

## 2018-10-16 RX ORDER — CYCLOBENZAPRINE HCL 10 MG
5 TABLET ORAL 3 TIMES DAILY PRN
Status: DISCONTINUED | OUTPATIENT
Start: 2018-10-16 | End: 2018-10-22 | Stop reason: HOSPADM

## 2018-10-16 RX ADMIN — Medication 10 ML: at 23:02

## 2018-10-16 RX ADMIN — ATORVASTATIN CALCIUM 40 MG: 20 TABLET, FILM COATED ORAL at 08:16

## 2018-10-16 RX ADMIN — ENALAPRIL MALEATE 20 MG: 5 TABLET ORAL at 08:16

## 2018-10-16 RX ADMIN — ENOXAPARIN SODIUM 40 MG: 40 INJECTION SUBCUTANEOUS at 08:16

## 2018-10-16 RX ADMIN — DOCUSATE SODIUM 100 MG: 100 CAPSULE, LIQUID FILLED ORAL at 08:18

## 2018-10-16 RX ADMIN — Medication 10 ML: at 22:02

## 2018-10-16 RX ADMIN — CYCLOBENZAPRINE HYDROCHLORIDE 5 MG: 10 TABLET, FILM COATED ORAL at 20:19

## 2018-10-16 RX ADMIN — CIPROFLOXACIN 400 MG: 2 INJECTION, SOLUTION INTRAVENOUS at 21:58

## 2018-10-16 RX ADMIN — HYDROCODONE BITARTRATE AND ACETAMINOPHEN 2 TABLET: 5; 325 TABLET ORAL at 12:21

## 2018-10-16 RX ADMIN — Medication 10 ML: at 08:17

## 2018-10-16 RX ADMIN — HYDROCODONE BITARTRATE AND ACETAMINOPHEN 1 TABLET: 5; 325 TABLET ORAL at 03:53

## 2018-10-16 RX ADMIN — POTASSIUM CHLORIDE 10 MEQ: 750 TABLET, FILM COATED, EXTENDED RELEASE ORAL at 08:17

## 2018-10-16 RX ADMIN — HYDROCODONE BITARTRATE AND ACETAMINOPHEN 2 TABLET: 5; 325 TABLET ORAL at 18:39

## 2018-10-16 RX ADMIN — DOCUSATE SODIUM 100 MG: 100 CAPSULE, LIQUID FILLED ORAL at 21:58

## 2018-10-16 RX ADMIN — HYDROCODONE BITARTRATE AND ACETAMINOPHEN 2 TABLET: 5; 325 TABLET ORAL at 08:00

## 2018-10-16 RX ADMIN — CIPROFLOXACIN 400 MG: 2 INJECTION, SOLUTION INTRAVENOUS at 10:18

## 2018-10-16 RX ADMIN — CYCLOBENZAPRINE HYDROCHLORIDE 5 MG: 10 TABLET, FILM COATED ORAL at 08:17

## 2018-10-16 ASSESSMENT — PAIN DESCRIPTION - PAIN TYPE
TYPE: SURGICAL PAIN

## 2018-10-16 ASSESSMENT — PAIN SCALES - GENERAL
PAINLEVEL_OUTOF10: 3
PAINLEVEL_OUTOF10: 2
PAINLEVEL_OUTOF10: 0
PAINLEVEL_OUTOF10: 0
PAINLEVEL_OUTOF10: 7
PAINLEVEL_OUTOF10: 3
PAINLEVEL_OUTOF10: 8
PAINLEVEL_OUTOF10: 4
PAINLEVEL_OUTOF10: 7
PAINLEVEL_OUTOF10: 2
PAINLEVEL_OUTOF10: 3
PAINLEVEL_OUTOF10: 7
PAINLEVEL_OUTOF10: 9
PAINLEVEL_OUTOF10: 5
PAINLEVEL_OUTOF10: 7

## 2018-10-16 ASSESSMENT — PAIN DESCRIPTION - LOCATION
LOCATION: HIP;LEG

## 2018-10-16 ASSESSMENT — PAIN DESCRIPTION - FREQUENCY
FREQUENCY: CONTINUOUS
FREQUENCY: CONTINUOUS

## 2018-10-16 ASSESSMENT — PAIN DESCRIPTION - ORIENTATION
ORIENTATION: RIGHT
ORIENTATION: RIGHT;ANTERIOR;OUTER

## 2018-10-16 ASSESSMENT — PAIN DESCRIPTION - ONSET
ONSET: ON-GOING
ONSET: ON-GOING

## 2018-10-16 ASSESSMENT — PAIN DESCRIPTION - DESCRIPTORS: DESCRIPTORS: ACHING;DULL

## 2018-10-16 ASSESSMENT — PAIN DESCRIPTION - PROGRESSION: CLINICAL_PROGRESSION: GRADUALLY IMPROVING

## 2018-10-16 NOTE — PROGRESS NOTES
3: Transfer bed to chair with walker TTWB RLE and min assist x 1          Long Term Goals  Time Frame for Long term goals : 14 days  Long term goal 1: Transfer supine to sit with CGA x 1  Long term goal 2: Transfer sit to stand with CG/min assist x 1  Long term goal 3: Ambulate with walker 10 ft to bathroom to complete self-care tasks with CGa x 1          Saranya Copping Therapy License Number: [id number]    Date: 10/16/2018

## 2018-10-16 NOTE — PROGRESS NOTES
Goals  Time Frame for Long term goals : 14 days  Long term goal 1: Transfer supine to sit with CGA x 1  Long term goal 2: Transfer sit to stand with CG/min assist x 1  Long term goal 3: Ambulate with walker 10 ft to bathroom to complete self-care tasks with CGa x 1          Michelle Olp Therapy License Number: [id number]    Date: 10/16/2018

## 2018-10-17 PROCEDURE — 6360000002 HC RX W HCPCS: Performed by: ORTHOPAEDIC SURGERY

## 2018-10-17 PROCEDURE — 2580000003 HC RX 258: Performed by: ORTHOPAEDIC SURGERY

## 2018-10-17 PROCEDURE — 97530 THERAPEUTIC ACTIVITIES: CPT

## 2018-10-17 PROCEDURE — 6370000000 HC RX 637 (ALT 250 FOR IP): Performed by: INTERNAL MEDICINE

## 2018-10-17 PROCEDURE — 6370000000 HC RX 637 (ALT 250 FOR IP): Performed by: ORTHOPAEDIC SURGERY

## 2018-10-17 PROCEDURE — 97535 SELF CARE MNGMENT TRAINING: CPT

## 2018-10-17 PROCEDURE — 6360000002 HC RX W HCPCS: Performed by: INTERNAL MEDICINE

## 2018-10-17 PROCEDURE — 94761 N-INVAS EAR/PLS OXIMETRY MLT: CPT

## 2018-10-17 PROCEDURE — 97110 THERAPEUTIC EXERCISES: CPT

## 2018-10-17 PROCEDURE — 1200000000 HC SEMI PRIVATE

## 2018-10-17 RX ADMIN — CYCLOBENZAPRINE HYDROCHLORIDE 5 MG: 10 TABLET, FILM COATED ORAL at 21:21

## 2018-10-17 RX ADMIN — HYDROCODONE BITARTRATE AND ACETAMINOPHEN 2 TABLET: 5; 325 TABLET ORAL at 08:37

## 2018-10-17 RX ADMIN — DOCUSATE SODIUM 100 MG: 100 CAPSULE, LIQUID FILLED ORAL at 21:20

## 2018-10-17 RX ADMIN — POTASSIUM CHLORIDE 10 MEQ: 750 TABLET, FILM COATED, EXTENDED RELEASE ORAL at 08:37

## 2018-10-17 RX ADMIN — CIPROFLOXACIN 400 MG: 2 INJECTION, SOLUTION INTRAVENOUS at 21:22

## 2018-10-17 RX ADMIN — ENOXAPARIN SODIUM 40 MG: 40 INJECTION SUBCUTANEOUS at 08:36

## 2018-10-17 RX ADMIN — ATORVASTATIN CALCIUM 40 MG: 20 TABLET, FILM COATED ORAL at 08:37

## 2018-10-17 RX ADMIN — DOCUSATE SODIUM 100 MG: 100 CAPSULE, LIQUID FILLED ORAL at 08:38

## 2018-10-17 RX ADMIN — CIPROFLOXACIN 400 MG: 2 INJECTION, SOLUTION INTRAVENOUS at 10:33

## 2018-10-17 RX ADMIN — HYDROCODONE BITARTRATE AND ACETAMINOPHEN 2 TABLET: 5; 325 TABLET ORAL at 14:40

## 2018-10-17 RX ADMIN — CYCLOBENZAPRINE HYDROCHLORIDE 5 MG: 10 TABLET, FILM COATED ORAL at 10:41

## 2018-10-17 RX ADMIN — HYDROCODONE BITARTRATE AND ACETAMINOPHEN 2 TABLET: 5; 325 TABLET ORAL at 21:21

## 2018-10-17 RX ADMIN — HYDROCODONE BITARTRATE AND ACETAMINOPHEN 2 TABLET: 5; 325 TABLET ORAL at 03:30

## 2018-10-17 RX ADMIN — ENALAPRIL MALEATE 20 MG: 5 TABLET ORAL at 08:37

## 2018-10-17 RX ADMIN — Medication 10 ML: at 08:37

## 2018-10-17 RX ADMIN — Medication 10 ML: at 21:22

## 2018-10-17 ASSESSMENT — PAIN SCALES - GENERAL
PAINLEVEL_OUTOF10: 3
PAINLEVEL_OUTOF10: 3
PAINLEVEL_OUTOF10: 7
PAINLEVEL_OUTOF10: 10
PAINLEVEL_OUTOF10: 2
PAINLEVEL_OUTOF10: 7
PAINLEVEL_OUTOF10: 7
PAINLEVEL_OUTOF10: 3
PAINLEVEL_OUTOF10: 8
PAINLEVEL_OUTOF10: 3

## 2018-10-17 ASSESSMENT — PAIN DESCRIPTION - DESCRIPTORS
DESCRIPTORS: ACHING;DULL
DESCRIPTORS: BURNING;SHOOTING;SPASM
DESCRIPTORS: ACHING

## 2018-10-17 ASSESSMENT — PAIN DESCRIPTION - LOCATION
LOCATION: HIP;LEG

## 2018-10-17 ASSESSMENT — PAIN DESCRIPTION - PAIN TYPE
TYPE: SURGICAL PAIN

## 2018-10-17 ASSESSMENT — PAIN DESCRIPTION - FREQUENCY
FREQUENCY: INTERMITTENT
FREQUENCY: INTERMITTENT

## 2018-10-17 ASSESSMENT — PAIN DESCRIPTION - ORIENTATION
ORIENTATION: RIGHT;ANTERIOR

## 2018-10-17 ASSESSMENT — PAIN DESCRIPTION - ONSET
ONSET: ON-GOING
ONSET: ON-GOING

## 2018-10-17 ASSESSMENT — PAIN DESCRIPTION - PROGRESSION
CLINICAL_PROGRESSION: GRADUALLY IMPROVING
CLINICAL_PROGRESSION: GRADUALLY IMPROVING

## 2018-10-17 NOTE — PROGRESS NOTES
membranes and septum. Neck: no adenopathy and supple, symmetrical, trachea midline  Lungs: clear to auscultation bilaterally  Heart: regular rate and rhythm, S1, S2 normal and systolic murmur. Abdomen: soft, non-tender; bowel sounds normal; no masses,  no organomegaly  Extremities: Post op, no calf tenderness, SCDS on. Neurologic: Mental status: Alert, oriented, thought content appropriate    Assessment and Plan:   1.  Close displaced subtrochanteric fracture of the right femur - s/p right intramedullary niru on 10/12. 2.  Intractable pain secondary to # 1 - stable on the current pain medication. Flexeril helped with spasms. 3.  Chronic right foot drop. 4.  Post op anemia - stable. 5.  Leukocytosis - likely reactive (improving). 6.  HTN - BP flucatuates.  On Vasotec. 7.  Hyperlipidemia - on Lipitor. 8.  Constipation - improved on Dulcolax. 9.  Situational depression - will need monitoring for continued depression.    10. Urinary retention - requiring orr cath. 11.  UTI - E.coli sensitive to Cipro. Plan:  1. Continue the current care. 2.  Discharge to [facility] once approved by insurance. 3.  Will try to remove the orr prior to discharge as she will likely be successful after the UTI is treated. Patient continues to require in patient admission secondary to the need for pain mgt and therapy.     Patient Active Problem List:     Closed intertrochanteric fracture of right femur ([facility] [address]     Closed displaced intertrochanteric fracture of right femur ([address]      Wisam Pearson MD  [facility] Hospitalist

## 2018-10-17 NOTE — PLAN OF CARE
Problem: Falls - Risk of:  Goal: Will remain free from falls  Will remain free from falls   Outcome: Ongoing  Pt educated on use of call light for assistance

## 2018-10-17 NOTE — PROGRESS NOTES
[facility]  [facility]upational Therapy  Daily Note  Date: 10/17/2018  Patient Name: Dolores Kay        MRN: [id number]    : 1945  (74 y.o.)    Subjective: Pt supine in bed upon arrival.    Objective  ADL     Feeding: Independent  Grooming: Setup  UE Bathing: Setup  LE Bathing: Setup, Moderate assistance (for feet and bottom)  UE Dressing: Setup  LE Dressing: Setup, Maximum assistance  Toileting: Moderate assistance        Supine to Sit: Minimal assistance (x2)  Sit to Supine: Moderate assistance (x2)     Sit to stand: Minimal assistance (x2)  Stand to sit: Minimal assistance         Balance  Sitting Balance: Contact guard assistance  Standing Balance: Minimal assistance (x2)  Standing Balance  Sit to stand: Minimal assistance (x2)  Stand to sit: Minimal assistance    Assessment  Assessment: Pt supine in bed upon arrival. Requires MIN A x2 for supine to sit as well as all transfers this morning. Completes sponge bath and dressing tasks as documented above. Educated pt on AE for bathing/dressing tasks. Demonstrates good use of sock aide this session after instruction/demonstration. Therapist assisted with donning sweatpants due to management of catheter. Attempts to pull up sweatpants with one hand however slight LOB's noted. Pt up in bedside chair to eat breakfast. Call light in reach and all needs met. Prognosis: Good  Discharge Recommendations: ECF with OT       Exercises:  See Flowsheets    Goals  Short Term Goals  Time Frame for Short term goals: 5 days (-)  Short term goal 1: Patient to transfer from bed to bedside commode with min assist x1  Short term goal 2: Patient to tolerate 15 minutes BUE exercise with 1 or fewer rest breaks in order to complete self care tasks. Short term goal 3: Patient to tolerate static standing x 5 minutes, min assist x1, no LOB in order to complete self care tasks.          Time In: 07  Time Out: 0816  Timed Coded Minutes: 23  Total Treatment Time: [address] GARCÍA Mckeon/L    Date: 10/17/2018

## 2018-10-18 PROCEDURE — 6370000000 HC RX 637 (ALT 250 FOR IP): Performed by: INTERNAL MEDICINE

## 2018-10-18 PROCEDURE — 97110 THERAPEUTIC EXERCISES: CPT

## 2018-10-18 PROCEDURE — 6360000002 HC RX W HCPCS: Performed by: ORTHOPAEDIC SURGERY

## 2018-10-18 PROCEDURE — 97535 SELF CARE MNGMENT TRAINING: CPT

## 2018-10-18 PROCEDURE — 97530 THERAPEUTIC ACTIVITIES: CPT

## 2018-10-18 PROCEDURE — 6370000000 HC RX 637 (ALT 250 FOR IP): Performed by: ORTHOPAEDIC SURGERY

## 2018-10-18 PROCEDURE — 94761 N-INVAS EAR/PLS OXIMETRY MLT: CPT

## 2018-10-18 PROCEDURE — 1200000000 HC SEMI PRIVATE

## 2018-10-18 PROCEDURE — 6360000002 HC RX W HCPCS: Performed by: INTERNAL MEDICINE

## 2018-10-18 PROCEDURE — 2580000003 HC RX 258: Performed by: ORTHOPAEDIC SURGERY

## 2018-10-18 PROCEDURE — 97116 GAIT TRAINING THERAPY: CPT

## 2018-10-18 RX ADMIN — DOCUSATE SODIUM 100 MG: 100 CAPSULE, LIQUID FILLED ORAL at 21:37

## 2018-10-18 RX ADMIN — CIPROFLOXACIN 400 MG: 2 INJECTION, SOLUTION INTRAVENOUS at 09:04

## 2018-10-18 RX ADMIN — POTASSIUM CHLORIDE 10 MEQ: 750 TABLET, FILM COATED, EXTENDED RELEASE ORAL at 09:03

## 2018-10-18 RX ADMIN — CYCLOBENZAPRINE HYDROCHLORIDE 5 MG: 10 TABLET, FILM COATED ORAL at 07:03

## 2018-10-18 RX ADMIN — ATORVASTATIN CALCIUM 40 MG: 20 TABLET, FILM COATED ORAL at 09:03

## 2018-10-18 RX ADMIN — Medication 10 ML: at 21:37

## 2018-10-18 RX ADMIN — ENALAPRIL MALEATE 20 MG: 5 TABLET ORAL at 09:07

## 2018-10-18 RX ADMIN — HYDROCODONE BITARTRATE AND ACETAMINOPHEN 2 TABLET: 5; 325 TABLET ORAL at 21:36

## 2018-10-18 RX ADMIN — HYDROCODONE BITARTRATE AND ACETAMINOPHEN 2 TABLET: 5; 325 TABLET ORAL at 02:45

## 2018-10-18 RX ADMIN — HYDROCODONE BITARTRATE AND ACETAMINOPHEN 2 TABLET: 5; 325 TABLET ORAL at 14:02

## 2018-10-18 RX ADMIN — DOCUSATE SODIUM 100 MG: 100 CAPSULE, LIQUID FILLED ORAL at 09:03

## 2018-10-18 RX ADMIN — CYCLOBENZAPRINE HYDROCHLORIDE 5 MG: 10 TABLET, FILM COATED ORAL at 21:36

## 2018-10-18 RX ADMIN — ENOXAPARIN SODIUM 40 MG: 40 INJECTION SUBCUTANEOUS at 09:03

## 2018-10-18 RX ADMIN — CIPROFLOXACIN 400 MG: 2 INJECTION, SOLUTION INTRAVENOUS at 21:37

## 2018-10-18 RX ADMIN — HYDROCODONE BITARTRATE AND ACETAMINOPHEN 2 TABLET: 5; 325 TABLET ORAL at 09:03

## 2018-10-18 RX ADMIN — Medication 10 ML: at 09:03

## 2018-10-18 ASSESSMENT — PAIN SCALES - GENERAL
PAINLEVEL_OUTOF10: 0
PAINLEVEL_OUTOF10: 0
PAINLEVEL_OUTOF10: 7
PAINLEVEL_OUTOF10: 4
PAINLEVEL_OUTOF10: 7
PAINLEVEL_OUTOF10: 4
PAINLEVEL_OUTOF10: 3
PAINLEVEL_OUTOF10: 7
PAINLEVEL_OUTOF10: 3
PAINLEVEL_OUTOF10: 10
PAINLEVEL_OUTOF10: 0

## 2018-10-18 ASSESSMENT — PAIN DESCRIPTION - LOCATION
LOCATION: KNEE

## 2018-10-18 ASSESSMENT — PAIN DESCRIPTION - DESCRIPTORS
DESCRIPTORS: ACHING

## 2018-10-18 ASSESSMENT — PAIN DESCRIPTION - ORIENTATION
ORIENTATION: RIGHT

## 2018-10-18 ASSESSMENT — PAIN DESCRIPTION - FREQUENCY
FREQUENCY: INTERMITTENT
FREQUENCY: INTERMITTENT

## 2018-10-18 ASSESSMENT — PAIN DESCRIPTION - PAIN TYPE
TYPE: ACUTE PAIN

## 2018-10-18 NOTE — PROGRESS NOTES
Pt awake in bed. Assessment and vitals complete. Pt states pain was mostly relieved from PRN pain medication 3/10. Pt denies needs. Call light in reach.

## 2018-10-18 NOTE — PLAN OF CARE
Problem: Pain:  Goal: Pain level will decrease  Pain level will decrease   Outcome: Ongoing  PRN pain medication given this shift - will continue to monitor. Problem: Falls - Risk of:  Goal: Will remain free from falls  Will remain free from falls   Outcome: Met This Shift  No falls at this time - will continue to monitor. Problem: SAFETY  Goal: Free from accidental physical injury  Outcome: Met This Shift  No injury- will continue to monitor.

## 2018-10-18 NOTE — PROGRESS NOTES
Phone: Bryan  Date: 10/18/2018  Fax: [phone]      Physical Therapy    Daily Note    Patient Name: Sher Rey      : 1945  (74 y.o.)  MRN: [id number]     [x] Patient requires additional Physical Therapy    [] Anticipate Physical Therapy Discharge Soon     Assessment    Sit to Stand: Minimal Assistance, Moderate Assistance  Stand to sit: Minimal Assistance                     Assessment: Patient in chair upon arrival to room. She just finished washing up with OT and agrees to complete seated exercises per doc flowsheet. Able to tolerate addition of 2# weight on L LE without difficulty. Sit to stand from chair is min/mod assist.  Able to complete exercises to R LE in standing but notes fatigue and pain. Remains in chair following with call  light in reach to visit with company.   Discharge Recommendations: Continue to assess pending progress  Safety Devices  Type of devices: Call light within reach, Gait belt, Left in chair          Time In: 0950  Time Out:1016  Timed Coded Minutes: 26  Total Treatment Time: 26    Exercises:  See Flowsheets    Plan  Cont Per Plan Of Care    Goals  Short Term Goals  Time Frame for Short term goals: 5 days  Short term goal 1: Transfer supine to sit with min/mod assist x 1 for right LE management  Short term goal 2: Transfer sit to stand with min assist  Short term goal 3: Transfer bed to chair with walker TTWB RLE and min assist x 1          Long Term Goals  Time Frame for Long term goals : 14 days  Long term goal 1: Transfer supine to sit with CGA x 1  Long term goal 2: Transfer sit to stand with CG/min assist x 1  Long term goal 3: Ambulate with walker 10 ft to bathroom to complete self-care tasks with CGa x 1          [address] [place] [address] Therapy License Number: [id number]    Date: 10/18/2018

## 2018-10-18 NOTE — PROGRESS NOTES
Hospitalist Progress Note  10/18/2018 5:18 AM  Subjective:   Admit Date: 10/11/2018  PCP: Mary Ellen Urias MD    Interval History: Esther Viveros has no complaints this am.  She denies chest pain or SOB. Appetite is good, no nausea. Bowels are moving, orr cath in place. Pain is controlled. She feels she is doing well with therapy. Awaiting insurances approval for ECF placement. Diet: DIET GENERAL;  Dietary Nutrition Supplements: Standard High Calorie Oral Supplement  Medications:   Scheduled Meds:   ciprofloxacin  400 mg Intravenous Q12H    potassium chloride  10 mEq Oral Daily    sodium chloride flush  10 mL Intravenous 2 times per day    docusate sodium  100 mg Oral BID    enoxaparin  40 mg Subcutaneous Daily    atorvastatin  40 mg Oral Daily    enalapril  20 mg Oral Daily     Continuous Infusions:  CBC:   Recent Labs      10/15/18   0520   WBC  11.3*   HGB  9.4*   PLT  199     BMP:    Recent Labs      10/15/18   0520   NA  137   K  4.0   CL  107   CO2  24   BUN  23   CREATININE  0.55   GLUCOSE  115*     Hepatic: No results for input(s): AST, ALT, ALB, BILITOT, ALKPHOS in the last 72 hours. Troponin: No results for input(s): TROPONINI in the last 72 hours. BNP: No results for input(s): BNP in the last 72 hours. Lipids: No results for input(s): CHOL, HDL in the last 72 hours. Invalid input(s): LDLCALCU  INR: No results for input(s): INR in the last 72 hours. Objective:   Vitals: /75   Pulse 81   Temp 98.4 °F (36.9 °C) (Oral)   Resp 18   Ht 5' 5\" (1.651 m)   Wt 163 lb 1.6 oz (74 kg)   SpO2 94%   BMI 27.14 kg/m²   General appearance: alert and cooperative with exam  HEENT: Head: Normocephalic, no lesions, without obvious abnormality. Eye: Normal external eye, conjunctiva, lids cornea, RITU. Nose: Normal external nose, mucus membranes and septum.   Neck: no adenopathy, no carotid bruit and supple, symmetrical, trachea midline  Lungs: clear to auscultation bilaterally  Heart: regular

## 2018-10-18 NOTE — PROGRESS NOTES
self-care tasks with CGa x 1          [address] Therapy License Number: [id number]    Date: 10/18/2018

## 2018-10-19 PROCEDURE — 6360000002 HC RX W HCPCS: Performed by: INTERNAL MEDICINE

## 2018-10-19 PROCEDURE — 6360000002 HC RX W HCPCS: Performed by: ORTHOPAEDIC SURGERY

## 2018-10-19 PROCEDURE — 97110 THERAPEUTIC EXERCISES: CPT

## 2018-10-19 PROCEDURE — 1200000000 HC SEMI PRIVATE

## 2018-10-19 PROCEDURE — 97535 SELF CARE MNGMENT TRAINING: CPT

## 2018-10-19 PROCEDURE — 6370000000 HC RX 637 (ALT 250 FOR IP): Performed by: ORTHOPAEDIC SURGERY

## 2018-10-19 PROCEDURE — 6370000000 HC RX 637 (ALT 250 FOR IP): Performed by: INTERNAL MEDICINE

## 2018-10-19 PROCEDURE — 94761 N-INVAS EAR/PLS OXIMETRY MLT: CPT

## 2018-10-19 PROCEDURE — 2580000003 HC RX 258: Performed by: ORTHOPAEDIC SURGERY

## 2018-10-19 PROCEDURE — 97530 THERAPEUTIC ACTIVITIES: CPT

## 2018-10-19 RX ORDER — HYDROCODONE BITARTRATE AND ACETAMINOPHEN 5; 325 MG/1; MG/1
1 TABLET ORAL EVERY 6 HOURS PRN
Qty: 28 TABLET | Refills: 0 | Status: SHIPPED | OUTPATIENT
Start: 2018-10-19 | End: 2018-10-22 | Stop reason: HOSPADM

## 2018-10-19 RX ORDER — ENALAPRIL MALEATE 10 MG/1
20 TABLET ORAL DAILY
Qty: 30 TABLET | Refills: 2
Start: 2018-10-19 | End: 2021-06-04

## 2018-10-19 RX ORDER — CYCLOBENZAPRINE HCL 5 MG
5 TABLET ORAL 3 TIMES DAILY PRN
Qty: 30 TABLET | Refills: 0
Start: 2018-10-19 | End: 2018-10-29

## 2018-10-19 RX ORDER — ACETAMINOPHEN 325 MG/1
650 TABLET ORAL EVERY 4 HOURS PRN
Qty: 120 TABLET | Refills: 3
Start: 2018-10-19

## 2018-10-19 RX ORDER — PSEUDOEPHEDRINE HCL 30 MG
100 TABLET ORAL 2 TIMES DAILY
Qty: 60 CAPSULE | Refills: 1
Start: 2018-10-19 | End: 2021-06-04

## 2018-10-19 RX ORDER — CIPROFLOXACIN 500 MG/1
500 TABLET, FILM COATED ORAL 2 TIMES DAILY
Qty: 10 TABLET | Refills: 0 | Status: SHIPPED | OUTPATIENT
Start: 2018-10-19 | End: 2018-10-24

## 2018-10-19 RX ADMIN — POTASSIUM CHLORIDE 10 MEQ: 750 TABLET, FILM COATED, EXTENDED RELEASE ORAL at 09:00

## 2018-10-19 RX ADMIN — ENALAPRIL MALEATE 20 MG: 5 TABLET ORAL at 08:59

## 2018-10-19 RX ADMIN — HYDROCODONE BITARTRATE AND ACETAMINOPHEN 2 TABLET: 5; 325 TABLET ORAL at 04:24

## 2018-10-19 RX ADMIN — DOCUSATE SODIUM 100 MG: 100 CAPSULE, LIQUID FILLED ORAL at 21:34

## 2018-10-19 RX ADMIN — DOCUSATE SODIUM 100 MG: 100 CAPSULE, LIQUID FILLED ORAL at 09:00

## 2018-10-19 RX ADMIN — ENOXAPARIN SODIUM 40 MG: 40 INJECTION SUBCUTANEOUS at 08:59

## 2018-10-19 RX ADMIN — CYCLOBENZAPRINE HYDROCHLORIDE 5 MG: 10 TABLET, FILM COATED ORAL at 21:34

## 2018-10-19 RX ADMIN — Medication 10 ML: at 08:59

## 2018-10-19 RX ADMIN — CIPROFLOXACIN 400 MG: 2 INJECTION, SOLUTION INTRAVENOUS at 09:03

## 2018-10-19 RX ADMIN — ATORVASTATIN CALCIUM 40 MG: 20 TABLET, FILM COATED ORAL at 08:59

## 2018-10-19 RX ADMIN — CYCLOBENZAPRINE HYDROCHLORIDE 5 MG: 10 TABLET, FILM COATED ORAL at 09:00

## 2018-10-19 RX ADMIN — CIPROFLOXACIN 400 MG: 2 INJECTION, SOLUTION INTRAVENOUS at 21:34

## 2018-10-19 RX ADMIN — HYDROCODONE BITARTRATE AND ACETAMINOPHEN 2 TABLET: 5; 325 TABLET ORAL at 14:55

## 2018-10-19 RX ADMIN — HYDROCODONE BITARTRATE AND ACETAMINOPHEN 2 TABLET: 5; 325 TABLET ORAL at 19:18

## 2018-10-19 RX ADMIN — HYDROCODONE BITARTRATE AND ACETAMINOPHEN 2 TABLET: 5; 325 TABLET ORAL at 10:35

## 2018-10-19 RX ADMIN — Medication 10 ML: at 21:34

## 2018-10-19 ASSESSMENT — PAIN DESCRIPTION - ORIENTATION
ORIENTATION: RIGHT
ORIENTATION: RIGHT

## 2018-10-19 ASSESSMENT — PAIN SCALES - GENERAL
PAINLEVEL_OUTOF10: 0
PAINLEVEL_OUTOF10: 3
PAINLEVEL_OUTOF10: 6
PAINLEVEL_OUTOF10: 9
PAINLEVEL_OUTOF10: 7
PAINLEVEL_OUTOF10: 7
PAINLEVEL_OUTOF10: 4
PAINLEVEL_OUTOF10: 0
PAINLEVEL_OUTOF10: 9
PAINLEVEL_OUTOF10: 3
PAINLEVEL_OUTOF10: 2

## 2018-10-19 ASSESSMENT — PAIN DESCRIPTION - PAIN TYPE
TYPE: ACUTE PAIN
TYPE: ACUTE PAIN

## 2018-10-19 ASSESSMENT — PAIN DESCRIPTION - FREQUENCY: FREQUENCY: INTERMITTENT

## 2018-10-19 ASSESSMENT — PAIN DESCRIPTION - LOCATION
LOCATION: KNEE
LOCATION: LEG

## 2018-10-19 ASSESSMENT — PAIN DESCRIPTION - DESCRIPTORS: DESCRIPTORS: ACHING

## 2018-10-19 NOTE — PLAN OF CARE
Problem: Pain:  Goal: Pain level will decrease  Pain level will decrease   Outcome: Ongoing  PRN medication given for spasms - will continue to monitor. Problem: Falls - Risk of:  Goal: Will remain free from falls  Will remain free from falls   Outcome: Met This Shift  No falls at this time - will continue to monitor.      Problem: SAFETY  Goal: Free from accidental physical injury  Outcome: Met This Shift

## 2018-10-20 PROCEDURE — 6360000002 HC RX W HCPCS: Performed by: ORTHOPAEDIC SURGERY

## 2018-10-20 PROCEDURE — 97535 SELF CARE MNGMENT TRAINING: CPT

## 2018-10-20 PROCEDURE — 1200000000 HC SEMI PRIVATE

## 2018-10-20 PROCEDURE — 2580000003 HC RX 258: Performed by: ORTHOPAEDIC SURGERY

## 2018-10-20 PROCEDURE — 6360000002 HC RX W HCPCS: Performed by: INTERNAL MEDICINE

## 2018-10-20 PROCEDURE — 97110 THERAPEUTIC EXERCISES: CPT

## 2018-10-20 PROCEDURE — 6370000000 HC RX 637 (ALT 250 FOR IP): Performed by: ORTHOPAEDIC SURGERY

## 2018-10-20 PROCEDURE — 94761 N-INVAS EAR/PLS OXIMETRY MLT: CPT

## 2018-10-20 PROCEDURE — 6370000000 HC RX 637 (ALT 250 FOR IP): Performed by: INTERNAL MEDICINE

## 2018-10-20 RX ADMIN — ATORVASTATIN CALCIUM 40 MG: 20 TABLET, FILM COATED ORAL at 09:36

## 2018-10-20 RX ADMIN — CIPROFLOXACIN 400 MG: 2 INJECTION, SOLUTION INTRAVENOUS at 21:26

## 2018-10-20 RX ADMIN — Medication 10 ML: at 21:27

## 2018-10-20 RX ADMIN — Medication 10 ML: at 09:36

## 2018-10-20 RX ADMIN — HYDROCODONE BITARTRATE AND ACETAMINOPHEN 2 TABLET: 5; 325 TABLET ORAL at 19:14

## 2018-10-20 RX ADMIN — HYDROCODONE BITARTRATE AND ACETAMINOPHEN 2 TABLET: 5; 325 TABLET ORAL at 06:30

## 2018-10-20 RX ADMIN — HYDROCODONE BITARTRATE AND ACETAMINOPHEN 2 TABLET: 5; 325 TABLET ORAL at 15:08

## 2018-10-20 RX ADMIN — Medication 10 ML: at 10:36

## 2018-10-20 RX ADMIN — CYCLOBENZAPRINE HYDROCHLORIDE 5 MG: 10 TABLET, FILM COATED ORAL at 09:45

## 2018-10-20 RX ADMIN — ENALAPRIL MALEATE 20 MG: 5 TABLET ORAL at 09:35

## 2018-10-20 RX ADMIN — HYDROCODONE BITARTRATE AND ACETAMINOPHEN 2 TABLET: 5; 325 TABLET ORAL at 10:56

## 2018-10-20 RX ADMIN — HYDROCODONE BITARTRATE AND ACETAMINOPHEN 2 TABLET: 5; 325 TABLET ORAL at 23:33

## 2018-10-20 RX ADMIN — DOCUSATE SODIUM 100 MG: 100 CAPSULE, LIQUID FILLED ORAL at 21:27

## 2018-10-20 RX ADMIN — POTASSIUM CHLORIDE 10 MEQ: 750 TABLET, FILM COATED, EXTENDED RELEASE ORAL at 09:35

## 2018-10-20 RX ADMIN — HYDROCODONE BITARTRATE AND ACETAMINOPHEN 2 TABLET: 5; 325 TABLET ORAL at 02:04

## 2018-10-20 RX ADMIN — CYCLOBENZAPRINE HYDROCHLORIDE 5 MG: 10 TABLET, FILM COATED ORAL at 22:45

## 2018-10-20 RX ADMIN — DOCUSATE SODIUM 100 MG: 100 CAPSULE, LIQUID FILLED ORAL at 09:36

## 2018-10-20 RX ADMIN — ENOXAPARIN SODIUM 40 MG: 40 INJECTION SUBCUTANEOUS at 09:30

## 2018-10-20 RX ADMIN — CIPROFLOXACIN 400 MG: 2 INJECTION, SOLUTION INTRAVENOUS at 09:36

## 2018-10-20 ASSESSMENT — PAIN SCALES - GENERAL
PAINLEVEL_OUTOF10: 7
PAINLEVEL_OUTOF10: 3
PAINLEVEL_OUTOF10: 0
PAINLEVEL_OUTOF10: 7
PAINLEVEL_OUTOF10: 3
PAINLEVEL_OUTOF10: 10
PAINLEVEL_OUTOF10: 9
PAINLEVEL_OUTOF10: 4
PAINLEVEL_OUTOF10: 7
PAINLEVEL_OUTOF10: 5
PAINLEVEL_OUTOF10: 8

## 2018-10-20 NOTE — PROGRESS NOTES
Phone: Bryan  Date: 10/20/2018  Fax: [phone]      Physical Therapy    Daily Note    Patient Name: Arturo Snow      : 1945  (74 y.o.)  MRN: [id number]     [x] Patient requires additional Physical Therapy    [] Anticipate Physical Therapy Discharge Soon     Assessment  Bridging: Moderate assistance     Supine to Sit: Minimal assistance (x2)  Sit to Supine: Minimal assistance (for B LE's x 2)  Scooting: Minimal assistance    Sit to Stand: Minimal Assistance, Moderate Assistance  Stand to sit: Minimal Assistance  Bed to Chair: Minimal assistance  Stand Pivot Transfers: Minimal Assistance               Assessment: Co treated with OT this AM. Patient up in chair upon arrival. Pt states woul like to utilize bedside camode before therapy begins. Patient transfers from sit to stand CGAx2 and pivots to commode. When pt finished utilizing commode pt sit to stand CGA x1. Patient able to stand to clean self with SBA. Patient completed exercises steffany exercise log with good tolerance. Standing tolerance with activity this date was 4 minutes. Patient returned to recliner following therapy treatment and left with call light and tray table in reach.    Discharge Recommendations: Continue to assess pending progress  Safety Devices  Type of devices: Call light within reach, Left in bed          Time In: 0850  Time Out: 0945  Timed Coded Minutes: 55  Total Treatment Time: 28    Exercises:  See Flowsheets    Plan  Cont Per Plan Of Care    Goals  Short Term Goals  Time Frame for Short term goals: 5 days  Short term goal 1: Transfer supine to sit with min/mod assist x 1 for right LE management-NOT MET  Short term goal 2: Transfer sit to stand with min assist-NOT MET  Short term goal 3: Transfer bed to chair with walker TTWB RLE and min assist x 1-NOT MET          Long Term Goals  Time Frame for Long term goals : 14 days  Long term goal 1: Transfer supine to sit with CGA x 1  Long term goal 2:

## 2018-10-20 NOTE — PROGRESS NOTES
[facility]  Occupational Therapy  Daily Note  Date: 10/20/2018  Patient Name: Dolores Kay        MRN: [id number]    : 1945  (74 y.o.)    Subjective: Patient seated in bedside chair upon arrival.     Objective  ADL     Feeding: Independent  Grooming: Setup  UE Bathing: Setup  LE Bathing: Setup  UE Dressing: Setup  LE Dressing: Minimal assistance (to don depends )  Toileting: Minimal assistance (set up for toilet hygiene )        Sit to stand: Minimal assistance  Stand to sit: Minimal assistance  Stand Pivot Transfers: Contact guard assistance       Assessment  Assessment: Tolerated treatment session well on this date. Required less assistance with all transfers. Demonstrated ability to complete dax care as well as wash bottom. Patient was able to don depends over the R foot, required assistance to don over the L foot, and manage in standing. Patient stood x 4 minutes to complete BUE activity. Remains seated in bedside chair with call light in reach. Prognosis: Good  Discharge Recommendations: ECF with OT                Exercises:  See Flowsheets    Goals  Short Term Goals  Time Frame for Short term goals: 5 days (-)  Short term goal 1: Patient to transfer from bed to bedside commode with min assist x1  Short term goal 2: Patient to tolerate 15 minutes BUE exercise with 1 or fewer rest breaks in order to complete self care tasks. Short term goal 3: Patient to tolerate static standing x 5 minutes, min assist x1, no LOB in order to complete self care tasks.          Long Term Goals                      Timed Code Treatment Minutes: 27 Minutes     Time In: 3984  Time Out: 0945  Timed Coded Minutes: 27 (co-tx)  Total Treatment Time: [address]   OTR/L Date: 10/20/2018

## 2018-10-21 PROCEDURE — 6370000000 HC RX 637 (ALT 250 FOR IP): Performed by: INTERNAL MEDICINE

## 2018-10-21 PROCEDURE — 2580000003 HC RX 258: Performed by: ORTHOPAEDIC SURGERY

## 2018-10-21 PROCEDURE — 6370000000 HC RX 637 (ALT 250 FOR IP): Performed by: ORTHOPAEDIC SURGERY

## 2018-10-21 PROCEDURE — 94761 N-INVAS EAR/PLS OXIMETRY MLT: CPT

## 2018-10-21 PROCEDURE — 6360000002 HC RX W HCPCS: Performed by: INTERNAL MEDICINE

## 2018-10-21 PROCEDURE — 1200000000 HC SEMI PRIVATE

## 2018-10-21 PROCEDURE — 97535 SELF CARE MNGMENT TRAINING: CPT

## 2018-10-21 PROCEDURE — 6360000002 HC RX W HCPCS: Performed by: ORTHOPAEDIC SURGERY

## 2018-10-21 PROCEDURE — 97110 THERAPEUTIC EXERCISES: CPT

## 2018-10-21 RX ORDER — CIPROFLOXACIN 500 MG/1
500 TABLET, FILM COATED ORAL EVERY 12 HOURS SCHEDULED
Status: DISCONTINUED | OUTPATIENT
Start: 2018-10-21 | End: 2018-10-22 | Stop reason: HOSPADM

## 2018-10-21 RX ORDER — CIPROFLOXACIN 500 MG/1
500 TABLET, FILM COATED ORAL ONCE
Status: COMPLETED | OUTPATIENT
Start: 2018-10-21 | End: 2018-10-21

## 2018-10-21 RX ADMIN — POTASSIUM CHLORIDE 10 MEQ: 750 TABLET, FILM COATED, EXTENDED RELEASE ORAL at 09:13

## 2018-10-21 RX ADMIN — DOCUSATE SODIUM 100 MG: 100 CAPSULE, LIQUID FILLED ORAL at 09:13

## 2018-10-21 RX ADMIN — Medication 10 ML: at 09:12

## 2018-10-21 RX ADMIN — CIPROFLOXACIN HYDROCHLORIDE 500 MG: 500 TABLET, FILM COATED ORAL at 13:32

## 2018-10-21 RX ADMIN — DOCUSATE SODIUM 100 MG: 100 CAPSULE, LIQUID FILLED ORAL at 22:15

## 2018-10-21 RX ADMIN — CYCLOBENZAPRINE HYDROCHLORIDE 5 MG: 10 TABLET, FILM COATED ORAL at 16:32

## 2018-10-21 RX ADMIN — HYDROCODONE BITARTRATE AND ACETAMINOPHEN 2 TABLET: 5; 325 TABLET ORAL at 13:32

## 2018-10-21 RX ADMIN — ENOXAPARIN SODIUM 40 MG: 40 INJECTION SUBCUTANEOUS at 09:12

## 2018-10-21 RX ADMIN — CIPROFLOXACIN HYDROCHLORIDE 500 MG: 500 TABLET, FILM COATED ORAL at 22:15

## 2018-10-21 RX ADMIN — ATORVASTATIN CALCIUM 40 MG: 20 TABLET, FILM COATED ORAL at 09:13

## 2018-10-21 RX ADMIN — ENALAPRIL MALEATE 20 MG: 5 TABLET ORAL at 09:12

## 2018-10-21 RX ADMIN — HYDROCODONE BITARTRATE AND ACETAMINOPHEN 2 TABLET: 5; 325 TABLET ORAL at 17:33

## 2018-10-21 RX ADMIN — CYCLOBENZAPRINE HYDROCHLORIDE 5 MG: 10 TABLET, FILM COATED ORAL at 11:20

## 2018-10-21 RX ADMIN — HYDROCODONE BITARTRATE AND ACETAMINOPHEN 1 TABLET: 5; 325 TABLET ORAL at 22:15

## 2018-10-21 RX ADMIN — CIPROFLOXACIN 400 MG: 2 INJECTION, SOLUTION INTRAVENOUS at 10:57

## 2018-10-21 RX ADMIN — CYCLOBENZAPRINE HYDROCHLORIDE 5 MG: 10 TABLET, FILM COATED ORAL at 22:15

## 2018-10-21 RX ADMIN — HYDROCODONE BITARTRATE AND ACETAMINOPHEN 2 TABLET: 5; 325 TABLET ORAL at 05:05

## 2018-10-21 RX ADMIN — HYDROCODONE BITARTRATE AND ACETAMINOPHEN 2 TABLET: 5; 325 TABLET ORAL at 09:13

## 2018-10-21 ASSESSMENT — PAIN SCALES - GENERAL
PAINLEVEL_OUTOF10: 6
PAINLEVEL_OUTOF10: 5
PAINLEVEL_OUTOF10: 4
PAINLEVEL_OUTOF10: 7
PAINLEVEL_OUTOF10: 5
PAINLEVEL_OUTOF10: 7
PAINLEVEL_OUTOF10: 6
PAINLEVEL_OUTOF10: 9
PAINLEVEL_OUTOF10: 6
PAINLEVEL_OUTOF10: 7
PAINLEVEL_OUTOF10: 7
PAINLEVEL_OUTOF10: 5

## 2018-10-21 ASSESSMENT — PAIN SCALES - WONG BAKER: WONGBAKER_NUMERICALRESPONSE: 0

## 2018-10-21 NOTE — PROGRESS NOTES
[facility]  Occupational Therapy  Daily Note  Date: 10/21/2018  Patient Name: Ofelia Hutchins        MRN: [id number]    : 1945  (74 y.o.)    Subjective: Patient is supine in bed upon arrival.     Objective  ADL     Feeding: Independent  Grooming: Setup  UE Bathing: Setup  LE Bathing: Setup  UE Dressing: Setup  LE Dressing: Minimal assistance (to don depends)  Toileting: Contact guard assistance           Supine to Sit: Minimal assistance     Sit to stand: Contact guard assistance  Stand to sit: Contact guard assistance  Stand Pivot Transfers: Contact guard assistance    Assessment  Tolerated treatment session well on this date. Required less assistance with sit to stand. Also required less assistance with bathing and dressing. Patient still struggles with managing depends in standing. Remains seated in bedside chair with call light in reach. Exercises:  See Flowsheets    Goals  Short Term Goals  Time Frame for Short term goals: 5 days (10-19-18)  Short term goal 1: Patient to transfer from bed to bedside commode with min assist x1  Short term goal 2: Patient to tolerate 15 minutes BUE exercise with 1 or fewer rest breaks in order to complete self care tasks. Short term goal 3: Patient to tolerate static standing x 5 minutes, min assist x1, no LOB in order to complete self care tasks.          Long Term Goals                      Timed Code Treatment Minutes:39  Minutes     Time In: 9432  Time Out: 3658  Timed Coded Minutes: 39  Total Treatment Time: [address] SO/L Date: 10/21/2018

## 2018-10-21 NOTE — PROGRESS NOTES
Phone: Bryan  Date: 10/21/2018  Fax: [phone]      Physical Therapy    Daily Note    Patient Name: Ofelia Hutchins      : 1945  (74 y.o.)  MRN: [id number]     [x] Patient requires additional Physical Therapy    [] Anticipate Physical Therapy Discharge Soon     Assessment  Bridging: Moderate assistance     Supine to Sit: Minimal assistance  Sit to Supine: Minimal assistance  Scooting: Minimal assistance    Sit to Stand: Minimal Assistance, Moderate Assistance  Stand to sit: Minimal Assistance  Bed to Chair: Minimal assistance  Stand Pivot Transfers: Minimal Assistance               Assessment: Patient up in chair upon arrival. Patient completed exercises per exercise log. Patient able to complete sit to stand with min A x1 to complete standing exercises and stnding tolerance. Standing tolerance this date 4 minutes 30 seconds. Patient left with call light and tray table in reach.   Discharge Recommendations: Continue to assess pending progress  Safety Devices  Type of devices: Call light within reach, Left in bed          Time In: 0953  Time Out: 1020  Timed Coded Minutes: 27  Total Treatment Time: 27    Exercises:  See Flowsheets    Plan  Cont Per Plan Of Care    Goals  Short Term Goals  Time Frame for Short term goals: 5 days  Short term goal 1: Transfer supine to sit with min/mod assist x 1 for right LE management-NOT MET  Short term goal 2: Transfer sit to stand with min assist-NOT MET  Short term goal 3: Transfer bed to chair with walker TTWB RLE and min assist x 1-NOT MET          Long Term Goals  Time Frame for Long term goals : 14 days  Long term goal 1: Transfer supine to sit with CGA x 1  Long term goal 2: Transfer sit to stand with CG/min assist x 1  Long term goal 3: Ambulate with walker 10 ft to bathroom to complete self-care tasks with CGa x 1          Pippa Sarmiento Therapy License Number: [id number]    Date: 10/21/2018

## 2018-10-22 VITALS
HEART RATE: 81 BPM | RESPIRATION RATE: 14 BRPM | WEIGHT: 177.4 LBS | TEMPERATURE: 98 F | OXYGEN SATURATION: 96 % | BODY MASS INDEX: 29.56 KG/M2 | SYSTOLIC BLOOD PRESSURE: 154 MMHG | HEIGHT: 65 IN | DIASTOLIC BLOOD PRESSURE: 79 MMHG

## 2018-10-22 PROCEDURE — 97110 THERAPEUTIC EXERCISES: CPT

## 2018-10-22 PROCEDURE — 6370000000 HC RX 637 (ALT 250 FOR IP): Performed by: ORTHOPAEDIC SURGERY

## 2018-10-22 PROCEDURE — 6370000000 HC RX 637 (ALT 250 FOR IP): Performed by: INTERNAL MEDICINE

## 2018-10-22 PROCEDURE — 97530 THERAPEUTIC ACTIVITIES: CPT

## 2018-10-22 PROCEDURE — 94761 N-INVAS EAR/PLS OXIMETRY MLT: CPT

## 2018-10-22 PROCEDURE — 6360000002 HC RX W HCPCS: Performed by: ORTHOPAEDIC SURGERY

## 2018-10-22 PROCEDURE — 97116 GAIT TRAINING THERAPY: CPT

## 2018-10-22 RX ORDER — LIDOCAINE 50 MG/G
1 PATCH TOPICAL DAILY
Qty: 30 PATCH | Refills: 0 | DISCHARGE
Start: 2018-10-22 | End: 2021-05-18

## 2018-10-22 RX ORDER — SENNA PLUS 8.6 MG/1
1 TABLET ORAL 2 TIMES DAILY
Qty: 60 TABLET | Refills: 11 | DISCHARGE
Start: 2018-10-22 | End: 2019-10-22

## 2018-10-22 RX ORDER — HYDROCODONE BITARTRATE AND ACETAMINOPHEN 5; 325 MG/1; MG/1
1 TABLET ORAL EVERY 6 HOURS PRN
Qty: 28 TABLET | Refills: 0 | Status: SHIPPED | OUTPATIENT
Start: 2018-10-22 | End: 2018-10-29

## 2018-10-22 RX ADMIN — ATORVASTATIN CALCIUM 40 MG: 20 TABLET, FILM COATED ORAL at 08:12

## 2018-10-22 RX ADMIN — POTASSIUM CHLORIDE 10 MEQ: 750 TABLET, FILM COATED, EXTENDED RELEASE ORAL at 08:13

## 2018-10-22 RX ADMIN — HYDROCODONE BITARTRATE AND ACETAMINOPHEN 2 TABLET: 5; 325 TABLET ORAL at 13:00

## 2018-10-22 RX ADMIN — CYCLOBENZAPRINE HYDROCHLORIDE 5 MG: 10 TABLET, FILM COATED ORAL at 10:38

## 2018-10-22 RX ADMIN — ENOXAPARIN SODIUM 40 MG: 40 INJECTION SUBCUTANEOUS at 08:12

## 2018-10-22 RX ADMIN — ENALAPRIL MALEATE 20 MG: 5 TABLET ORAL at 08:12

## 2018-10-22 RX ADMIN — CIPROFLOXACIN HYDROCHLORIDE 500 MG: 500 TABLET, FILM COATED ORAL at 08:13

## 2018-10-22 RX ADMIN — HYDROCODONE BITARTRATE AND ACETAMINOPHEN 1 TABLET: 5; 325 TABLET ORAL at 02:21

## 2018-10-22 RX ADMIN — HYDROCODONE BITARTRATE AND ACETAMINOPHEN 2 TABLET: 5; 325 TABLET ORAL at 08:13

## 2018-10-22 RX ADMIN — HYDROCODONE BITARTRATE AND ACETAMINOPHEN 1 TABLET: 5; 325 TABLET ORAL at 02:19

## 2018-10-22 RX ADMIN — DOCUSATE SODIUM 100 MG: 100 CAPSULE, LIQUID FILLED ORAL at 08:13

## 2018-10-22 ASSESSMENT — PAIN SCALES - GENERAL
PAINLEVEL_OUTOF10: 10
PAINLEVEL_OUTOF10: 10
PAINLEVEL_OUTOF10: 4
PAINLEVEL_OUTOF10: 4
PAINLEVEL_OUTOF10: 3
PAINLEVEL_OUTOF10: 9

## 2018-10-22 ASSESSMENT — PAIN DESCRIPTION - FREQUENCY: FREQUENCY: CONTINUOUS

## 2018-10-22 ASSESSMENT — PAIN DESCRIPTION - PAIN TYPE: TYPE: ACUTE PAIN

## 2018-10-22 ASSESSMENT — PAIN DESCRIPTION - DESCRIPTORS: DESCRIPTORS: ACHING

## 2018-10-22 ASSESSMENT — PAIN DESCRIPTION - LOCATION: LOCATION: LEG

## 2018-10-22 ASSESSMENT — PAIN DESCRIPTION - ORIENTATION: ORIENTATION: RIGHT

## 2018-10-22 NOTE — PROGRESS NOTES
hospitals GENERAL [facility]  Occupational Therapy    Date: 10/22/2018  Patient Name: Nick Cooper        : 1945       [] Pt Refusal           [x] Pt Unavailable due to: Patient in bathroom upon arrival.  Patient is crying stating \" I need to pee and I can't. \"  Nurse notified. Patient unable to participate at this time, will check back at a later time.          SARAY Turcios/L Date: 10/22/2018

## 2018-10-22 NOTE — PLAN OF CARE
[facility]    Inpatient Occupational Therapy  Plan of Care  OT Orders Received and Evaluation Complete  Date: 10/19/2018  Patient Name: Nick Cooper        MRN: [id number]    : 1945  (74 y.o.)  Referring Practitioner: Dr. Magaly Mccurdy   Onset Date: 10/11/18  Referral Date: 10/12/2018  Diagnosis: R hip ORIF   Treatment Diagnosis: weakness    Identified Problem Areas  Performance deficits / Impairments: Decreased functional mobility , Decreased ADL status, Decreased strength, Decreased high-level IADLs, Decreased coordination, Decreased endurance       Justification for Skilled Services:  [x]  Complete Daily Tasks Safely  [x] Improve Balance   [x]  Return to Prior Level of Function  [x]  Family/Caregiver Education    [x] Improve UE strength  [x] Patient Education: [x]Adaptive Equipment   [x]Home Exercise Program and Progression    Treatment Plan  Plan  Times per week: Cont per POC   Times per day: Daily (1-2x day)  Plan weeks: 1  [] Modalities:  [x] Therapeutic Exercise   [x] Therapeutic Activity  [] Splinting:     [] Home Safety Evaluation         [x] ADL Retraining                       [] Muscle Re-education [] Cognitive Retraining            [] Sensory Integration  [x] Patient Education [x] Home Exercise Program [] Fine Motor Coordination  Discharge Recommendations: ECF with OT    GOALS  Short term goals  Time Frame for Short term goals: 5 days (10-24-18)  Short term goal 1: Patient to transfer to bathroom from bedside chair with CGA x1, no LOB noted. Short term goal 2: Patient to tolerate 15 minutes BUE exercise with 1 or fewer rest breaks in order to complete self care tasks. Short term goal 3: Patient to tolerate static standing x 5 minutes, min assist x1, no LOB in order to complete self care tasks. Upon Swingbed Transfer this Plan of Care will be followed until revision is completed.     Carrington Brady OTR/L                    Date: 10/19/2018

## 2018-10-22 NOTE — PROGRESS NOTES
GILSON reviewed Medicare IM with pt this AM and discharge plan. GILSON spoke with Ting Mendez at [facility] at 9:10 AM  and faxed info from this weekend. She called back at 9:40 AM stating she had made contact with insurance and they requested additional information so she sent the info from the weekend. She will call back. GILSON called Ting Mendez at [facility] to request update. She has not yet heard back and will call them and let GILSON know ASAP. Still waiting.     [facility]  10/22/2018

## 2018-10-22 NOTE — PROGRESS NOTES
[facility]  Occupational Therapy  Daily Note  Date: 10/22/2018  Patient Name: Yani Perez        MRN: [id number]    : 1945  (74 y.o.)    Subjective: Patient is in treatment room upon arrival.     Objective  Toileting: Contact guard assistance        Sit to stand: Contact guard assistance  Stand to sit: Contact guard assistance  Stand Pivot Transfers: Contact guard assistance       Assessment  Assessment: Tolerated treatment session well on this date. Completed BUE exercise with short rest breaks between each set due to fatigue. Patient refused any additional standing activities due to pain. Wheeled back to room, requested to use bathroom. Required CGA and minimal verbal cues to maintain TTWB status. Patient remains seated on commode, instructed to pull call light when ready to leave the bathroom. Prognosis: Good  Discharge Recommendations: ECF with OT                Exercises:  See Flowsheets    Goals  Short Term Goals  Time Frame for Short term goals: 5 days (10-19-18)  Short term goal 1: Patient to transfer from bed to bedside commode with min assist x1  Short term goal 2: Patient to tolerate 15 minutes BUE exercise with 1 or fewer rest breaks in order to complete self care tasks. Short term goal 3: Patient to tolerate static standing x 5 minutes, min assist x1, no LOB in order to complete self care tasks.          Long Term Goals                      Timed Code Treatment Minutes:39 Minutes     Time In: 9665  Time Out: 9522  Timed Coded Minutes: 39  Total Treatment Time: [address] [place] [address]   OTR/L Date: 10/22/2018

## 2018-10-22 NOTE — PROGRESS NOTES
Pt up to bathroom. States that her hemorrhoids are bleeding and she is unable to defecate or urinate. Hemorrhoids noted with some bleeding. Pt had large amount of hard formed stool in rectum. Stool was manually removed with assist of patient bearing down. Pt states that she feels better as far as stool, but is unable to void. Pt returned to bed and bladder scan completed for 235cc of urine noted.

## 2018-10-22 NOTE — DISCHARGE SUMMARY
Hospitalist Discharge Summary    Patient: Bubba Carpio  YOB: 1945    MRN: [id number]   Acct: [de-identified]    Primary Care Physician: Musa Manning MD    Admit date:  10/11/2018    Discharge date:  10/22/2018       Discharge Diagnoses:     1. Jerzy Smithland displaced subtrochanteric fracture of the right femur - s/p right intramedullary niru on 10/12/18. 2.  Intractable pain secondary to # 1 - improved. 3.  Chronic right foot drop. 4.  Post op anemia - stable. 5.  UTI secondary to E. Coli  6. Hyperliidemia  7. Constipation  8. HTN  9. Anemia - most likely due to blood loss into the fractured hip side, dilutional      Discharge Medications:       Jade Green   Home Medication Instructions [facility]:[id number]    Printed on:10/22/18 1836   Medication Information                      acetaminophen (TYLENOL) 325 MG tablet  Take 2 tablets by mouth every 4 hours as needed for Pain or Fever             aspirin (ADAMS ASPIRIN) 325 MG tablet  Take 1 tablet by mouth daily. atorvastatin (LIPITOR) 40 MG tablet  Take 40 mg by mouth daily             ciprofloxacin (CIPRO) 500 MG tablet  Take 1 tablet by mouth 2 times daily for 5 days             cyclobenzaprine (FLEXERIL) 5 MG tablet  Take 1 tablet by mouth 3 times daily as needed for Muscle spasms             docusate sodium (COLACE, DULCOLAX) 100 MG CAPS  Take 100 mg by mouth 2 times daily             enalapril (VASOTEC) 10 MG tablet  Take 2 tablets by mouth daily             HYDROcodone-acetaminophen (NORCO) 5-325 MG per tablet  Take 1 tablet by mouth every 6 hours as needed for Pain for up to 7 days. Sulema Farooq ibuprofen (ADVIL;MOTRIN) 200 MG tablet  Take 400 mg by mouth every 6 hours as needed.              lidocaine (LIDODERM) 5 %  Place 1 patch onto the skin daily 12 hours on, 12 hours off.             potassium chloride (KLOR-CON) 10 MEQ extended release tablet  Take 10 mEq by mouth daily             senna (SENOKOT) 8.6 MG tablet  Take 1 showed mildly displaced and angulated right proximal femur intertrochanteric fracture.  The patient was evaluated by  and recommended admission for surgical repair.  Shiva Marinelli was admitted with IV hydration and IV pain medication. Her home medication was continued. Dr. Jackie Manuel was consulted and took Liam Pinzon to surgery on 10/12/18. She had not intra or post op complications. Labs were monitored after surgery with her Hgb dropping 9.4 and stabilizing. She was placed on SQ Lovenox and SCDs for DVT prophylaxis. After surgery she had trouble with urinary retention requiring a orr cath. UA was positive for UTI and IV Cipro was started. The culture grew out E.coli sensitive to Cipro. She finished Antibiotic course   PT / OT was ordered daily. With Liam Pinzon living alone it was felt that she would require ECF placement to continue with therapy before returning home.  was consulted and made arrangements for ECF placement.   Prior to discharge her orr was removed with no problems with urinary retention.        Disposition: SNF    Condition: stable    Time Spent: 32 minutes      Electronically signed by Ga Fernández MD on 10/22/2018 at 6:31 PM  Discharging Hospitalist

## 2018-10-22 NOTE — PROGRESS NOTES
GILSON received a call back from [employer]. [facility]. Pt will go at [place] [id number] via [employer]. GILSON confirmed with Bao Rahman at [facility].     [facility], [address], [place]  10/22/2018

## 2018-11-08 ENCOUNTER — HOSPITAL ENCOUNTER (OUTPATIENT)
Age: 73
Discharge: HOME | End: 2018-11-10
Payer: MEDICARE

## 2018-11-08 ENCOUNTER — HOSPITAL ENCOUNTER (OUTPATIENT)
Dept: GENERAL RADIOLOGY | Age: 73
Discharge: HOME | End: 2018-11-10
Payer: MEDICARE

## 2018-11-08 DIAGNOSIS — S72.001S CLOSED FRACTURE OF RIGHT HIP, SEQUELA: ICD-10-CM

## 2018-11-08 PROCEDURE — 73502 X-RAY EXAM HIP UNI 2-3 VIEWS: CPT

## 2018-11-29 ENCOUNTER — HOSPITAL ENCOUNTER (OUTPATIENT)
Age: 73
Discharge: HOME | End: 2018-12-01
Payer: MEDICARE

## 2018-11-29 ENCOUNTER — HOSPITAL ENCOUNTER (OUTPATIENT)
Dept: GENERAL RADIOLOGY | Age: 73
Discharge: HOME | End: 2018-12-01
Payer: MEDICARE

## 2018-11-29 DIAGNOSIS — M84.451D PATHOLOGICAL FRACTURE OF RIGHT FEMUR WITH ROUTINE HEALING, UNSPECIFIED PATHOLOGICAL CAUSE, SUBSEQUENT ENCOUNTER: ICD-10-CM

## 2018-11-29 PROCEDURE — 73502 X-RAY EXAM HIP UNI 2-3 VIEWS: CPT

## 2021-05-18 ENCOUNTER — HOSPITAL ENCOUNTER (EMERGENCY)
Age: 76
Discharge: HOME | End: 2021-05-18
Attending: FAMILY MEDICINE
Payer: MEDICARE

## 2021-05-18 ENCOUNTER — APPOINTMENT (OUTPATIENT)
Dept: CT IMAGING | Age: 76
End: 2021-05-18
Payer: MEDICARE

## 2021-05-18 VITALS
BODY MASS INDEX: 27.67 KG/M2 | RESPIRATION RATE: 18 BRPM | WEIGHT: 166.1 LBS | TEMPERATURE: 98.1 F | HEIGHT: 65 IN | OXYGEN SATURATION: 99 % | HEART RATE: 88 BPM | SYSTOLIC BLOOD PRESSURE: 194 MMHG | DIASTOLIC BLOOD PRESSURE: 96 MMHG

## 2021-05-18 DIAGNOSIS — G45.9 TIA (TRANSIENT ISCHEMIC ATTACK): Primary | ICD-10-CM

## 2021-05-18 LAB
ABSOLUTE EOS #: 0 K/UL (ref 0–0.4)
ABSOLUTE IMMATURE GRANULOCYTE: ABNORMAL K/UL (ref 0–0.3)
ABSOLUTE LYMPH #: 1.2 K/UL (ref 1–4.8)
ABSOLUTE MONO #: 0.6 K/UL (ref 0–1)
ANION GAP SERPL CALCULATED.3IONS-SCNC: 9 MMOL/L (ref 9–17)
BASOPHILS # BLD: 1 % (ref 0–2)
BASOPHILS ABSOLUTE: 0.1 K/UL (ref 0–0.2)
BUN BLDV-MCNC: 14 MG/DL (ref 8–23)
BUN/CREAT BLD: 22 (ref 9–20)
CALCIUM SERPL-MCNC: 8.9 MG/DL (ref 8.6–10.4)
CHLORIDE BLD-SCNC: 107 MMOL/L (ref 98–107)
CO2: 25 MMOL/L (ref 20–31)
CREAT SERPL-MCNC: 0.64 MG/DL (ref 0.5–0.9)
DIFFERENTIAL TYPE: YES
EOSINOPHILS RELATIVE PERCENT: 1 % (ref 0–5)
GFR AFRICAN AMERICAN: >60 ML/MIN
GFR NON-AFRICAN AMERICAN: >60 ML/MIN
GFR SERPL CREATININE-BSD FRML MDRD: ABNORMAL ML/MIN/{1.73_M2}
GFR SERPL CREATININE-BSD FRML MDRD: ABNORMAL ML/MIN/{1.73_M2}
GLUCOSE BLD-MCNC: 106 MG/DL (ref 70–99)
HCT VFR BLD CALC: 43.8 % (ref 36–46)
HEMOGLOBIN: 14.7 G/DL (ref 12–16)
IMMATURE GRANULOCYTES: ABNORMAL %
LYMPHOCYTES # BLD: 13 % (ref 15–40)
MCH RBC QN AUTO: 30 PG (ref 26–34)
MCHC RBC AUTO-ENTMCNC: 33.6 G/DL (ref 31–37)
MCV RBC AUTO: 89.4 FL (ref 80–100)
MONOCYTES # BLD: 7 % (ref 4–8)
NRBC AUTOMATED: ABNORMAL PER 100 WBC
PDW BLD-RTO: 14.9 % (ref 12.1–15.2)
PLATELET # BLD: 248 K/UL (ref 140–450)
PLATELET ESTIMATE: ABNORMAL
PMV BLD AUTO: ABNORMAL FL (ref 6–12)
POTASSIUM SERPL-SCNC: 3.8 MMOL/L (ref 3.7–5.3)
RBC # BLD: 4.9 M/UL (ref 4–5.2)
RBC # BLD: ABNORMAL 10*6/UL
SEG NEUTROPHILS: 78 % (ref 47–75)
SEGMENTED NEUTROPHILS ABSOLUTE COUNT: 7.1 K/UL (ref 2.5–7)
SODIUM BLD-SCNC: 141 MMOL/L (ref 135–144)
TROPONIN INTERP: ABNORMAL
TROPONIN T: ABNORMAL NG/ML
TROPONIN, HIGH SENSITIVITY: 22 NG/L (ref 0–14)
WBC # BLD: 9 K/UL (ref 3.5–11)
WBC # BLD: ABNORMAL 10*3/UL

## 2021-05-18 PROCEDURE — 99283 EMERGENCY DEPT VISIT LOW MDM: CPT

## 2021-05-18 PROCEDURE — 85025 COMPLETE CBC W/AUTO DIFF WBC: CPT

## 2021-05-18 PROCEDURE — 93005 ELECTROCARDIOGRAM TRACING: CPT | Performed by: FAMILY MEDICINE

## 2021-05-18 PROCEDURE — 70450 CT HEAD/BRAIN W/O DYE: CPT

## 2021-05-18 PROCEDURE — 84484 ASSAY OF TROPONIN QUANT: CPT

## 2021-05-18 PROCEDURE — 80048 BASIC METABOLIC PNL TOTAL CA: CPT

## 2021-05-19 ASSESSMENT — ENCOUNTER SYMPTOMS
SHORTNESS OF BREATH: 0
FACIAL SWELLING: 0

## 2021-05-19 NOTE — ED PROVIDER NOTES
[facility]  eMERGENCY dEPARTMENT eNCOUnter          [facility]       Chief Complaint   Patient presents with    Other     Pt reports \"had episode yesterday\" where she felt her right arm \"flopped onto her leg\" Lasted just a few minutes. Denies numbness in right hand/arm. Nurses Notes reviewed and I agree except as noted in the HPI. HISTORY OF PRESENT ILLNESS    Kareen Collazo is a 66 y.o. female who presents to the emergency room via private vehicle, patient states that she had an episode day prior when she felt her right arm lose both sensation and strength at the elbow down, states she was driving when she noticed something was brushed up against her right upper leg, when she looked down she realizes arm, patient relates she had no sensation or strength in the arm, the event happening approximately 2 minutes and then resolved on its own, has not had any recurrent symptoms since that time. Patient denies any similar prior. Patient denies headache lightheadedness dizziness or change in vision, denies any facial asymmetry or swelling, denies trauma falls or head strikes. Denies chest pain or palpitations difficulty breathing shortness of breath. Denies recent illness. PCP: MÓNICA Gomez    REVIEW OF SYSTEMS     Review of Systems   HENT: Negative for facial swelling and tinnitus. Eyes: Negative for visual disturbance. Respiratory: Negative for shortness of breath. Cardiovascular: Negative for chest pain and palpitations. Neurological: Negative for dizziness, light-headedness and headaches. All other systems reviewed and are negative. PAST MEDICAL HISTORY    has a past medical history of Dropfoot, Foot drop, and Hypertension. SURGICAL HISTORY      has a past surgical history that includes Appendectomy; Hysterectomy; Cataract removal; Ankle surgery; fracture surgery; Wrist fracture surgery (Left, 2002);  Hip fracture surgery (Right, 10/12/2018); and pr office/outpt visit,procedure only (Right, 10/12/2018). CURRENT MEDICATIONS       Discharge Medication List as of 5/18/2021 12:49 PM      CONTINUE these medications which have NOT CHANGED    Details   acetaminophen (TYLENOL) 325 MG tablet Take 2 tablets by mouth every 4 hours as needed for Pain or Fever, Disp-120 tablet, R-3NO PRINT      enalapril (VASOTEC) 10 MG tablet Take 2 tablets by mouth daily, Disp-30 tablet, R-2NO PRINT      docusate sodium (COLACE, DULCOLAX) 100 MG CAPS Take 100 mg by mouth 2 times daily, Disp-60 capsule, R-1NO PRINT      atorvastatin (LIPITOR) 40 MG tablet Take 40 mg by mouth dailyHistorical Med      potassium chloride (KLOR-CON) 10 MEQ extended release tablet Take 10 mEq by mouth dailyHistorical Med      aspirin (ADAMS ASPIRIN) 325 MG tablet Take 1 tablet by mouth daily. , Disp-30 tablet, R-3      ibuprofen (ADVIL;MOTRIN) 200 MG tablet Take 400 mg by mouth every 6 hours as needed. ALLERGIES     is allergic to penicillin g, penicillins, vicodin [hydrocodone-acetaminophen], and alendronate. FAMILY HISTORY     has no family status information on file. family history is not on file. SOCIAL HISTORY      reports that she has never smoked. She has never used smokeless tobacco. She reports that she does not drink alcohol and does not use drugs. PHYSICAL EXAM     INITIAL VITALS:  height is 5' 5\" (1.651 m) and weight is 166 lb 1.6 oz (75.3 kg). Her oral temperature is 98.1 °F (36.7 °C). Her blood pressure is 194/96 (abnormal) and her pulse is 88. Her respiration is 18 and oxygen saturation is 99%. Physical Exam   Constitutional: Patient is oriented to person, place, and time. Patient appears well-developed and well-nourished. Patient is active and cooperative. HENT:   Head: Normocephalic and atraumatic. Head is without contusion. Right Ear: Hearing and external ear normal. No drainage. Left Ear: Hearing and external ear normal. No drainage.    Nose: Nose normal. No nasal deformity. No epistaxis. Mouth/Throat: Mucous membranes are not dry. Eyes: EOMI/PERRL. Conjunctivae, sclera, and lids are normal. Right eye exhibits no discharge. Left eye exhibits no discharge. Neck: Full passive range of motion without pain and phonation normal.   Cardiovascular:  Normal rate, regular rhythm and intact distal pulses. No gross lower extremity edema. Pulses: Right radial pulse  2+   Pulmonary/Chest: Effort normal. No tachypnea and no bradypnea. No wheezes, rhonchi, or rales. Abdominal: BMI 27.6, soft, nontender  Musculoskeletal:   Negative acute trauma or deformity,  apparent full range of motion and normal strength all extremities appropriate to age. Neurological: Patient is alert and oriented to person, place, and time. patient displays no tremor. Patient displays no seizure activity. . Normal observed gait. CN II-XII grossly intact. NIHSS = 0  Lymphatic:    Skin: Skin is warm and dry. Patient is not diaphoretic. Psychiatric: Patient has a normal mood and pleasant affect. Patient speech is normal and behavior is normal. Cognition and memory are normal.    DIFFERENTIAL DIAGNOSIS:   CVA/TIA, SZ, MSK NOS    DIAGNOSTIC RESULTS     EKG: All EKG's are interpreted by the Emergency Department Physician who either signs or Co-signs this chart in the absence of a cardiologist.  EKG    The patient had an EKG which is interpreted by me in the absence of a Cardiologist.   [] Without comparison to previous. [x] With comparison to a previous EKG Dated 10/11/2018    EKG @ 1203 hrs -minus rhythm with secondary AV block Mobitz 1 occasional and consecutive PVCs, rate 88, normal axis, verbal AL,   prior EKG not showing Mobitz I      RADIOLOGY: non-plain film images(s) such as CT, Ultrasound and MRI are read by the radiologist.  CT Head WO Contrast   Final Result      No acute processes.                US CAROTID ARTERY BILATERAL    (Results Pending)       LABS:   Labs Reviewed   CBC WITH AUTO DIFFERENTIAL - Abnormal; Notable for the following components:       Result Value    Seg Neutrophils 78 (*)     Lymphocytes 13 (*)     Segs Absolute 7.10 (*)     All other components within normal limits   BASIC METABOLIC PANEL W/ REFLEX TO MG FOR LOW K - Abnormal; Notable for the following components:    Glucose 106 (*)     Bun/Cre Ratio 22 (*)     All other components within normal limits   TROPONIN - Abnormal; Notable for the following components:    Troponin, High Sensitivity 22 (*)     All other components within normal limits       EMERGENCY DEPARTMENT COURSE:   Vitals:    Vitals:    05/18/21 1131 05/18/21 1133   BP:  (!) 194/96   Pulse: 88    Resp: 18    Temp: 98.1 °F (36.7 °C)    TempSrc: Oral    SpO2: 99%    Weight: 166 lb 1.6 oz (75.3 kg)    Height: 5' 5\" (1.651 m)      Patient history and physical exam taken at bedside, discussed patient symptoms and exam findings, discussed initial work-up to include CT head noncontrast EKG blood work. Patient resting high semi-Fowlers in bed, acknowledges    EKG as above    NIHSS = 0, CN II-XII grossly intact    CT radiology report reviewed    Updated patient on current work-up, waiting lab findings, acknowledged    Lab work-up reviewed noting normal WBC with 78% PMNs no bands, normal H&H and platelets, SCR 2.88 BUN 14, normal electrolytes, hs-NICHOLAS 22    Discussed with patient my concern for TIA event, symptoms have completely resolved, there was some discussion regarding admission versus outpatient work-up, this patient has otherwise showing no symptoms in the last ~24 hrs, I feel patient be safely worked up outpatient as long as done closely, will order echo of heart and carotid ultrasounds, close follow-up with established primary care, discussed with patient continue take her aspirin daily, if any return of symptoms go to the nearest emergency room, patient acknowledges    FINAL IMPRESSION      1.  TIA (transient ischemic attack)          DISPOSITION/PLAN

## 2021-05-20 LAB
EKG ATRIAL RATE: 92 BPM
EKG P AXIS: 77 DEGREES
EKG Q-T INTERVAL: 388 MS
EKG QRS DURATION: 118 MS
EKG QTC CALCULATION (BAZETT): 469 MS
EKG R AXIS: 61 DEGREES
EKG T AXIS: 76 DEGREES
EKG VENTRICULAR RATE: 88 BPM

## 2021-05-20 PROCEDURE — 93010 ELECTROCARDIOGRAM REPORT: CPT | Performed by: INTERNAL MEDICINE

## 2021-05-24 ENCOUNTER — HOSPITAL ENCOUNTER (OUTPATIENT)
Dept: VASCULAR LAB | Age: 76
Discharge: HOME | End: 2021-05-26
Payer: MEDICARE

## 2021-05-24 ENCOUNTER — HOSPITAL ENCOUNTER (OUTPATIENT)
Dept: NON INVASIVE DIAGNOSTICS | Age: 76
Discharge: HOME | End: 2021-05-24
Payer: MEDICARE

## 2021-05-24 DIAGNOSIS — G45.9 TIA (TRANSIENT ISCHEMIC ATTACK): ICD-10-CM

## 2021-05-24 LAB
LV EF: 55 %
LVEF MODALITY: NORMAL

## 2021-05-24 PROCEDURE — 93880 EXTRACRANIAL BILAT STUDY: CPT

## 2021-05-24 PROCEDURE — 93306 TTE W/DOPPLER COMPLETE: CPT

## 2021-06-04 ENCOUNTER — HOSPITAL ENCOUNTER (INPATIENT)
Age: 76
LOS: 1 days | Discharge: HOME | DRG: 378 | End: 2021-06-05
Attending: EMERGENCY MEDICINE | Admitting: SURGERY
Payer: MEDICARE

## 2021-06-04 ENCOUNTER — ANESTHESIA EVENT (OUTPATIENT)
Dept: ENDOSCOPY | Age: 76
DRG: 378 | End: 2021-06-04
Payer: MEDICARE

## 2021-06-04 ENCOUNTER — ANESTHESIA (OUTPATIENT)
Dept: ENDOSCOPY | Age: 76
DRG: 378 | End: 2021-06-04
Payer: MEDICARE

## 2021-06-04 VITALS
SYSTOLIC BLOOD PRESSURE: 154 MMHG | RESPIRATION RATE: 10 BRPM | DIASTOLIC BLOOD PRESSURE: 79 MMHG | OXYGEN SATURATION: 92 %

## 2021-06-04 DIAGNOSIS — K92.2 UPPER GI BLEED: ICD-10-CM

## 2021-06-04 DIAGNOSIS — R53.1 GENERAL WEAKNESS: Primary | ICD-10-CM

## 2021-06-04 DIAGNOSIS — N39.0 URINARY TRACT INFECTION WITHOUT HEMATURIA, SITE UNSPECIFIED: ICD-10-CM

## 2021-06-04 DIAGNOSIS — R19.7 DIARRHEA, UNSPECIFIED TYPE: ICD-10-CM

## 2021-06-04 LAB
-: ABNORMAL
ABO/RH: NORMAL
ABSOLUTE EOS #: 0.1 K/UL (ref 0–0.4)
ABSOLUTE IMMATURE GRANULOCYTE: ABNORMAL K/UL (ref 0–0.3)
ABSOLUTE LYMPH #: 1.3 K/UL (ref 1–4.8)
ABSOLUTE MONO #: 0.7 K/UL (ref 0–1)
ALBUMIN SERPL-MCNC: 3 G/DL (ref 3.5–5.2)
ALBUMIN/GLOBULIN RATIO: ABNORMAL (ref 1–2.5)
ALP BLD-CCNC: 74 U/L (ref 35–104)
ALT SERPL-CCNC: 16 U/L (ref 5–33)
AMORPHOUS: ABNORMAL
ANION GAP SERPL CALCULATED.3IONS-SCNC: 9 MMOL/L (ref 9–17)
ANTIBODY SCREEN: NEGATIVE
ARM BAND NUMBER: NORMAL
AST SERPL-CCNC: 21 U/L
BACTERIA: ABNORMAL
BASOPHILS # BLD: 0 % (ref 0–2)
BASOPHILS ABSOLUTE: 0 K/UL (ref 0–0.2)
BILIRUB SERPL-MCNC: 0.35 MG/DL (ref 0.3–1.2)
BILIRUBIN URINE: NEGATIVE
BUN BLDV-MCNC: 13 MG/DL (ref 8–23)
BUN/CREAT BLD: 20 (ref 9–20)
CALCIUM SERPL-MCNC: 8.6 MG/DL (ref 8.6–10.4)
CASTS UA: ABNORMAL /LPF
CHLORIDE BLD-SCNC: 110 MMOL/L (ref 98–107)
CO2: 24 MMOL/L (ref 20–31)
COLOR: YELLOW
COMMENT UA: ABNORMAL
CREAT SERPL-MCNC: 0.64 MG/DL (ref 0.5–0.9)
CRYSTALS, UA: ABNORMAL /HPF
DIFFERENTIAL TYPE: YES
EOSINOPHILS RELATIVE PERCENT: 1 % (ref 0–5)
EPITHELIAL CELLS UA: ABNORMAL /HPF
EXPIRATION DATE: NORMAL
GFR AFRICAN AMERICAN: >60 ML/MIN
GFR NON-AFRICAN AMERICAN: >60 ML/MIN
GFR SERPL CREATININE-BSD FRML MDRD: ABNORMAL ML/MIN/{1.73_M2}
GFR SERPL CREATININE-BSD FRML MDRD: ABNORMAL ML/MIN/{1.73_M2}
GLUCOSE BLD-MCNC: 121 MG/DL (ref 70–99)
GLUCOSE URINE: NEGATIVE
HCT VFR BLD CALC: 25.8 % (ref 36–46)
HCT VFR BLD CALC: 26.8 % (ref 36–46)
HCT VFR BLD CALC: 29.9 % (ref 36–46)
HEMOGLOBIN: 10.1 G/DL (ref 12–16)
HEMOGLOBIN: 8.8 G/DL (ref 12–16)
HEMOGLOBIN: 8.8 G/DL (ref 12–16)
IMMATURE GRANULOCYTES: ABNORMAL %
KETONES, URINE: NEGATIVE
LACTIC ACID, WHOLE BLOOD: ABNORMAL MMOL/L (ref 0.7–2.1)
LACTIC ACID: 2.4 MMOL/L (ref 0.5–2.2)
LEUKOCYTE ESTERASE, URINE: ABNORMAL
LYMPHOCYTES # BLD: 14 % (ref 15–40)
MCH RBC QN AUTO: 30.5 PG (ref 26–34)
MCHC RBC AUTO-ENTMCNC: 33.8 G/DL (ref 31–37)
MCV RBC AUTO: 90.1 FL (ref 80–100)
MONOCYTES # BLD: 7 % (ref 4–8)
MUCUS: ABNORMAL
NITRITE, URINE: NEGATIVE
NRBC AUTOMATED: ABNORMAL PER 100 WBC
OTHER OBSERVATIONS UA: ABNORMAL
PDW BLD-RTO: 15.2 % (ref 12.1–15.2)
PH UA: 6 (ref 5–8)
PLATELET # BLD: 257 K/UL (ref 140–450)
PLATELET ESTIMATE: ABNORMAL
PMV BLD AUTO: ABNORMAL FL (ref 6–12)
POTASSIUM SERPL-SCNC: 3.7 MMOL/L (ref 3.7–5.3)
PROTEIN UA: NEGATIVE
RBC # BLD: 3.32 M/UL (ref 4–5.2)
RBC # BLD: ABNORMAL 10*6/UL
RBC UA: ABNORMAL /HPF (ref 0–2)
RENAL EPITHELIAL, UA: ABNORMAL /HPF
SARS-COV-2, RAPID: NOT DETECTED
SEG NEUTROPHILS: 78 % (ref 47–75)
SEGMENTED NEUTROPHILS ABSOLUTE COUNT: 7.3 K/UL (ref 2.5–7)
SODIUM BLD-SCNC: 143 MMOL/L (ref 135–144)
SPECIFIC GRAVITY UA: 1.01 (ref 1–1.03)
SPECIMEN DESCRIPTION: NORMAL
TOTAL PROTEIN: 5.7 G/DL (ref 6.4–8.3)
TRICHOMONAS: ABNORMAL
TURBIDITY: CLEAR
URINE HGB: ABNORMAL
UROBILINOGEN, URINE: NORMAL
WBC # BLD: 9.4 K/UL (ref 3.5–11)
WBC # BLD: ABNORMAL 10*3/UL
WBC UA: ABNORMAL /HPF
YEAST: ABNORMAL

## 2021-06-04 PROCEDURE — 2580000003 HC RX 258: Performed by: INTERNAL MEDICINE

## 2021-06-04 PROCEDURE — 87086 URINE CULTURE/COLONY COUNT: CPT

## 2021-06-04 PROCEDURE — 86900 BLOOD TYPING SEROLOGIC ABO: CPT

## 2021-06-04 PROCEDURE — 85018 HEMOGLOBIN: CPT

## 2021-06-04 PROCEDURE — G0378 HOSPITAL OBSERVATION PER HR: HCPCS

## 2021-06-04 PROCEDURE — C9113 INJ PANTOPRAZOLE SODIUM, VIA: HCPCS | Performed by: EMERGENCY MEDICINE

## 2021-06-04 PROCEDURE — 99283 EMERGENCY DEPT VISIT LOW MDM: CPT

## 2021-06-04 PROCEDURE — 81001 URINALYSIS AUTO W/SCOPE: CPT

## 2021-06-04 PROCEDURE — 93005 ELECTROCARDIOGRAM TRACING: CPT | Performed by: EMERGENCY MEDICINE

## 2021-06-04 PROCEDURE — 94761 N-INVAS EAR/PLS OXIMETRY MLT: CPT

## 2021-06-04 PROCEDURE — 2580000003 HC RX 258: Performed by: EMERGENCY MEDICINE

## 2021-06-04 PROCEDURE — 3700000000 HC ANESTHESIA ATTENDED CARE: Performed by: SURGERY

## 2021-06-04 PROCEDURE — 80053 COMPREHEN METABOLIC PANEL: CPT

## 2021-06-04 PROCEDURE — 7100000000 HC PACU RECOVERY - FIRST 15 MIN: Performed by: SURGERY

## 2021-06-04 PROCEDURE — 0DB78ZX EXCISION OF STOMACH, PYLORUS, VIA NATURAL OR ARTIFICIAL OPENING ENDOSCOPIC, DIAGNOSTIC: ICD-10-PCS | Performed by: SURGERY

## 2021-06-04 PROCEDURE — 6360000002 HC RX W HCPCS: Performed by: INTERNAL MEDICINE

## 2021-06-04 PROCEDURE — 3609012400 HC EGD TRANSORAL BIOPSY SINGLE/MULTIPLE: Performed by: SURGERY

## 2021-06-04 PROCEDURE — 2580000003 HC RX 258: Performed by: NURSE ANESTHETIST, CERTIFIED REGISTERED

## 2021-06-04 PROCEDURE — C9113 INJ PANTOPRAZOLE SODIUM, VIA: HCPCS | Performed by: INTERNAL MEDICINE

## 2021-06-04 PROCEDURE — 6360000002 HC RX W HCPCS: Performed by: NURSE ANESTHETIST, CERTIFIED REGISTERED

## 2021-06-04 PROCEDURE — 3700000001 HC ADD 15 MINUTES (ANESTHESIA): Performed by: SURGERY

## 2021-06-04 PROCEDURE — 88305 TISSUE EXAM BY PATHOLOGIST: CPT

## 2021-06-04 PROCEDURE — 86901 BLOOD TYPING SEROLOGIC RH(D): CPT

## 2021-06-04 PROCEDURE — 36415 COLL VENOUS BLD VENIPUNCTURE: CPT

## 2021-06-04 PROCEDURE — 96374 THER/PROPH/DIAG INJ IV PUSH: CPT

## 2021-06-04 PROCEDURE — 85014 HEMATOCRIT: CPT

## 2021-06-04 PROCEDURE — 2709999900 HC NON-CHARGEABLE SUPPLY: Performed by: SURGERY

## 2021-06-04 PROCEDURE — 83605 ASSAY OF LACTIC ACID: CPT

## 2021-06-04 PROCEDURE — 87635 SARS-COV-2 COVID-19 AMP PRB: CPT

## 2021-06-04 PROCEDURE — 85025 COMPLETE CBC W/AUTO DIFF WBC: CPT

## 2021-06-04 PROCEDURE — 6370000000 HC RX 637 (ALT 250 FOR IP): Performed by: INTERNAL MEDICINE

## 2021-06-04 PROCEDURE — 6370000000 HC RX 637 (ALT 250 FOR IP): Performed by: SURGERY

## 2021-06-04 PROCEDURE — 87077 CULTURE AEROBIC IDENTIFY: CPT

## 2021-06-04 PROCEDURE — 99221 1ST HOSP IP/OBS SF/LOW 40: CPT | Performed by: SURGERY

## 2021-06-04 PROCEDURE — 1200000000 HC SEMI PRIVATE

## 2021-06-04 PROCEDURE — 2500000003 HC RX 250 WO HCPCS: Performed by: NURSE ANESTHETIST, CERTIFIED REGISTERED

## 2021-06-04 PROCEDURE — 86850 RBC ANTIBODY SCREEN: CPT

## 2021-06-04 PROCEDURE — 6360000002 HC RX W HCPCS: Performed by: EMERGENCY MEDICINE

## 2021-06-04 RX ORDER — SODIUM CHLORIDE, SODIUM LACTATE, POTASSIUM CHLORIDE, CALCIUM CHLORIDE 600; 310; 30; 20 MG/100ML; MG/100ML; MG/100ML; MG/100ML
INJECTION, SOLUTION INTRAVENOUS CONTINUOUS
Status: DISCONTINUED | OUTPATIENT
Start: 2021-06-04 | End: 2021-06-04

## 2021-06-04 RX ORDER — TRAMADOL HYDROCHLORIDE 50 MG/1
50 TABLET ORAL PRN
COMMUNITY
Start: 2020-10-01

## 2021-06-04 RX ORDER — SODIUM CHLORIDE 9 MG/ML
INJECTION, SOLUTION INTRAVENOUS CONTINUOUS
Status: DISCONTINUED | OUTPATIENT
Start: 2021-06-04 | End: 2021-06-05

## 2021-06-04 RX ORDER — MIDAZOLAM HYDROCHLORIDE 2 MG/2ML
INJECTION, SOLUTION INTRAMUSCULAR; INTRAVENOUS PRN
Status: DISCONTINUED | OUTPATIENT
Start: 2021-06-04 | End: 2021-06-04 | Stop reason: SDUPTHER

## 2021-06-04 RX ORDER — LIDOCAINE HYDROCHLORIDE 10 MG/ML
INJECTION, SOLUTION EPIDURAL; INFILTRATION; INTRACAUDAL; PERINEURAL PRN
Status: DISCONTINUED | OUTPATIENT
Start: 2021-06-04 | End: 2021-06-04 | Stop reason: SDUPTHER

## 2021-06-04 RX ORDER — MAGNESIUM SULFATE 1 G/100ML
1000 INJECTION INTRAVENOUS PRN
Status: DISCONTINUED | OUTPATIENT
Start: 2021-06-04 | End: 2021-06-05 | Stop reason: HOSPADM

## 2021-06-04 RX ORDER — ONDANSETRON 4 MG/1
4 TABLET, ORALLY DISINTEGRATING ORAL EVERY 8 HOURS PRN
Status: DISCONTINUED | OUTPATIENT
Start: 2021-06-04 | End: 2021-06-05 | Stop reason: HOSPADM

## 2021-06-04 RX ORDER — 0.9 % SODIUM CHLORIDE 0.9 %
1000 INTRAVENOUS SOLUTION INTRAVENOUS ONCE
Status: COMPLETED | OUTPATIENT
Start: 2021-06-04 | End: 2021-06-04

## 2021-06-04 RX ORDER — PROPOFOL 10 MG/ML
INJECTION, EMULSION INTRAVENOUS PRN
Status: DISCONTINUED | OUTPATIENT
Start: 2021-06-04 | End: 2021-06-04 | Stop reason: SDUPTHER

## 2021-06-04 RX ORDER — SODIUM CHLORIDE 0.9 % (FLUSH) 0.9 %
5-40 SYRINGE (ML) INJECTION PRN
Status: DISCONTINUED | OUTPATIENT
Start: 2021-06-04 | End: 2021-06-05 | Stop reason: HOSPADM

## 2021-06-04 RX ORDER — LEVOFLOXACIN 500 MG/1
250 TABLET, FILM COATED ORAL DAILY
Status: DISCONTINUED | OUTPATIENT
Start: 2021-06-04 | End: 2021-06-05 | Stop reason: HOSPADM

## 2021-06-04 RX ORDER — SODIUM CHLORIDE 0.9 % (FLUSH) 0.9 %
10 SYRINGE (ML) INJECTION PRN
Status: DISCONTINUED | OUTPATIENT
Start: 2021-06-04 | End: 2021-06-05 | Stop reason: HOSPADM

## 2021-06-04 RX ORDER — PANTOPRAZOLE SODIUM 40 MG/10ML
40 INJECTION, POWDER, LYOPHILIZED, FOR SOLUTION INTRAVENOUS 2 TIMES DAILY
Status: DISCONTINUED | OUTPATIENT
Start: 2021-06-04 | End: 2021-06-05 | Stop reason: HOSPADM

## 2021-06-04 RX ORDER — SUCRALFATE ORAL 1 G/10ML
1 SUSPENSION ORAL 4 TIMES DAILY
Qty: 420 ML | Refills: 1 | Status: SHIPPED | OUTPATIENT
Start: 2021-06-04 | End: 2022-05-03

## 2021-06-04 RX ORDER — SODIUM CHLORIDE 0.9 % (FLUSH) 0.9 %
5-40 SYRINGE (ML) INJECTION EVERY 12 HOURS SCHEDULED
Status: DISCONTINUED | OUTPATIENT
Start: 2021-06-04 | End: 2021-06-05 | Stop reason: HOSPADM

## 2021-06-04 RX ORDER — CIPROFLOXACIN 2 MG/ML
400 INJECTION, SOLUTION INTRAVENOUS ONCE
Status: COMPLETED | OUTPATIENT
Start: 2021-06-04 | End: 2021-06-04

## 2021-06-04 RX ORDER — SODIUM CHLORIDE 9 MG/ML
25 INJECTION, SOLUTION INTRAVENOUS PRN
Status: DISCONTINUED | OUTPATIENT
Start: 2021-06-04 | End: 2021-06-05 | Stop reason: HOSPADM

## 2021-06-04 RX ORDER — PANTOPRAZOLE SODIUM 40 MG/10ML
40 INJECTION, POWDER, LYOPHILIZED, FOR SOLUTION INTRAVENOUS ONCE
Status: COMPLETED | OUTPATIENT
Start: 2021-06-04 | End: 2021-06-04

## 2021-06-04 RX ORDER — ONDANSETRON 2 MG/ML
4 INJECTION INTRAMUSCULAR; INTRAVENOUS EVERY 6 HOURS PRN
Status: DISCONTINUED | OUTPATIENT
Start: 2021-06-04 | End: 2021-06-05 | Stop reason: HOSPADM

## 2021-06-04 RX ORDER — POTASSIUM CHLORIDE 750 MG/1
10 TABLET, FILM COATED, EXTENDED RELEASE ORAL DAILY
Status: DISCONTINUED | OUTPATIENT
Start: 2021-06-04 | End: 2021-06-05 | Stop reason: HOSPADM

## 2021-06-04 RX ORDER — CLOPIDOGREL BISULFATE 75 MG/1
75 TABLET ORAL DAILY
Status: ON HOLD | COMMUNITY
Start: 2021-05-27 | End: 2021-06-05 | Stop reason: HOSPADM

## 2021-06-04 RX ORDER — POTASSIUM CHLORIDE 7.45 MG/ML
10 INJECTION INTRAVENOUS PRN
Status: DISCONTINUED | OUTPATIENT
Start: 2021-06-04 | End: 2021-06-05 | Stop reason: HOSPADM

## 2021-06-04 RX ORDER — FENTANYL CITRATE 50 UG/ML
INJECTION, SOLUTION INTRAMUSCULAR; INTRAVENOUS PRN
Status: DISCONTINUED | OUTPATIENT
Start: 2021-06-04 | End: 2021-06-04 | Stop reason: SDUPTHER

## 2021-06-04 RX ORDER — PANTOPRAZOLE SODIUM 40 MG/1
40 TABLET, DELAYED RELEASE ORAL
Status: DISCONTINUED | OUTPATIENT
Start: 2021-06-04 | End: 2021-06-04

## 2021-06-04 RX ORDER — SODIUM CHLORIDE, SODIUM LACTATE, POTASSIUM CHLORIDE, CALCIUM CHLORIDE 600; 310; 30; 20 MG/100ML; MG/100ML; MG/100ML; MG/100ML
INJECTION, SOLUTION INTRAVENOUS CONTINUOUS PRN
Status: DISCONTINUED | OUTPATIENT
Start: 2021-06-04 | End: 2021-06-04 | Stop reason: SDUPTHER

## 2021-06-04 RX ORDER — PANTOPRAZOLE SODIUM 40 MG/1
40 TABLET, DELAYED RELEASE ORAL DAILY
Qty: 30 TABLET | Refills: 3 | Status: SHIPPED | OUTPATIENT
Start: 2021-06-04

## 2021-06-04 RX ORDER — ENALAPRIL MALEATE 20 MG/1
20 TABLET ORAL 2 TIMES DAILY
COMMUNITY
Start: 2020-08-19

## 2021-06-04 RX ORDER — ATORVASTATIN CALCIUM 80 MG/1
80 TABLET, FILM COATED ORAL DAILY
COMMUNITY
Start: 2021-05-27 | End: 2022-08-18 | Stop reason: SDUPTHER

## 2021-06-04 RX ORDER — SUCRALFATE 1 G/1
1 TABLET ORAL EVERY 6 HOURS SCHEDULED
Status: DISCONTINUED | OUTPATIENT
Start: 2021-06-04 | End: 2021-06-05 | Stop reason: HOSPADM

## 2021-06-04 RX ORDER — SODIUM CHLORIDE 0.9 % (FLUSH) 0.9 %
10 SYRINGE (ML) INJECTION EVERY 12 HOURS SCHEDULED
Status: DISCONTINUED | OUTPATIENT
Start: 2021-06-04 | End: 2021-06-05 | Stop reason: HOSPADM

## 2021-06-04 RX ORDER — SODIUM CHLORIDE 9 MG/ML
10 INJECTION INTRAVENOUS ONCE
Status: COMPLETED | OUTPATIENT
Start: 2021-06-04 | End: 2021-06-04

## 2021-06-04 RX ADMIN — LEVOFLOXACIN 250 MG: 500 TABLET, FILM COATED ORAL at 21:38

## 2021-06-04 RX ADMIN — PANTOPRAZOLE SODIUM 40 MG: 40 INJECTION, POWDER, FOR SOLUTION INTRAVENOUS at 21:33

## 2021-06-04 RX ADMIN — PROPOFOL 50 MG: 10 INJECTION, EMULSION INTRAVENOUS at 14:06

## 2021-06-04 RX ADMIN — SODIUM CHLORIDE 10 ML: 9 INJECTION, SOLUTION INTRAMUSCULAR; INTRAVENOUS; SUBCUTANEOUS at 11:46

## 2021-06-04 RX ADMIN — SODIUM CHLORIDE: 9 INJECTION, SOLUTION INTRAVENOUS at 17:19

## 2021-06-04 RX ADMIN — CIPROFLOXACIN 400 MG: 2 INJECTION, SOLUTION INTRAVENOUS at 12:25

## 2021-06-04 RX ADMIN — SODIUM CHLORIDE 1000 ML: 9 INJECTION, SOLUTION INTRAVENOUS at 10:20

## 2021-06-04 RX ADMIN — MIDAZOLAM HYDROCHLORIDE 2 MG: 2 INJECTION, SOLUTION INTRAMUSCULAR; INTRAVENOUS at 14:00

## 2021-06-04 RX ADMIN — PROPOFOL 50 MG: 10 INJECTION, EMULSION INTRAVENOUS at 14:13

## 2021-06-04 RX ADMIN — FENTANYL CITRATE 50 MCG: 50 INJECTION, SOLUTION INTRAMUSCULAR; INTRAVENOUS at 14:00

## 2021-06-04 RX ADMIN — SODIUM CHLORIDE, PRESERVATIVE FREE 20 ML: 5 INJECTION INTRAVENOUS at 21:33

## 2021-06-04 RX ADMIN — SODIUM CHLORIDE, POTASSIUM CHLORIDE, SODIUM LACTATE AND CALCIUM CHLORIDE: 600; 310; 30; 20 INJECTION, SOLUTION INTRAVENOUS at 14:00

## 2021-06-04 RX ADMIN — LIDOCAINE HYDROCHLORIDE 100 MG: 10 INJECTION, SOLUTION EPIDURAL; INFILTRATION; INTRACAUDAL; PERINEURAL at 14:06

## 2021-06-04 RX ADMIN — FENTANYL CITRATE 50 MCG: 50 INJECTION, SOLUTION INTRAMUSCULAR; INTRAVENOUS at 14:06

## 2021-06-04 RX ADMIN — PANTOPRAZOLE SODIUM 40 MG: 40 INJECTION, POWDER, FOR SOLUTION INTRAVENOUS at 11:46

## 2021-06-04 RX ADMIN — SUCRALFATE 1 G: 1 TABLET ORAL at 21:33

## 2021-06-04 ASSESSMENT — PAIN - FUNCTIONAL ASSESSMENT: PAIN_FUNCTIONAL_ASSESSMENT: 0-10

## 2021-06-04 ASSESSMENT — ENCOUNTER SYMPTOMS
VOMITING: 0
BACK PAIN: 0
TROUBLE SWALLOWING: 0
BLOOD IN STOOL: 1
SORE THROAT: 0
DIARRHEA: 1
NAUSEA: 0
ABDOMINAL PAIN: 0
SHORTNESS OF BREATH: 0
CHOKING: 0
COUGH: 0

## 2021-06-04 ASSESSMENT — PAIN SCALES - GENERAL
PAINLEVEL_OUTOF10: 0

## 2021-06-04 NOTE — OP NOTE
[place] [id number]                                OPERATIVE REPORT    PATIENT NAME: Tomi Isaacs                      :        1945  MED REC NO:   [id number]                              ROOM:       4512  ACCOUNT NO:   [de-identified]                           ADMIT DATE: 2021  PROVIDER:     Warner Vega    DATE OF PROCEDURE:  2021    ATTENDING SURGEON:  Warner Vega MD    PCP:  Charlene Ruiz MD    PREOPERATIVE DIAGNOSES:  1.  GI bleed. 2.  Anemia. POSTOPERATIVE DIAGNOSES:  1. Hiatal hernia with widely patent Schatzki's ring. 2.  Antral gastritis with superficial ulcerations. 3.  Large duodenal ulcer with white clot (D1). OPERATION:  1. Esophagogastroduodenoscopy. 2.  Prepyloric antral biopsies. ANESTHESIA:  MAC.    ESTIMATED BLOOD LOSS:  Less than 20 mL. INDICATIONS:  The patient is a pleasant 69-year-old white female who  presents with a 5-day history of diarrhea and melanotic stool. She has  chronic back pain for which she takes ibuprofen on a regular basis. She  also takes Plavix daily. Hemoglobin on admission was 10. This was down  from 15 just a few weeks ago. Baseline hemoglobin appears to be 10 as  of 2018. She denies abdominal pain. No reflux symptoms. Mild nausea. No previous endoscopy. At this time, diagnostic endoscopy is indicated. OPERATIVE PROCEDURE:  After obtaining informed consent with discussion  of risks, benefits, and alternatives including a risk of GI bleeding,  perforation, missed lesions, COVID-19 exposure/infection, etc., the  patient was taken to the endoscopy suite and placed in the left lateral  recumbent position. Following adequate IV sedation, an endoscope was  passed over the tongue into the posterior oropharynx.   Vocal folds were  visualized and appeared normal.  Scope was directed into the esophagus  and onto the GE junction. Upper and mid esophagus appeared normal.  In  the lower esophagus, a 3 to 5 cm sliding-type hiatal hernia was present. A widely patent Schatzki's ring was noted. There was no significant  esophagitis, no varices, no strictures, no lesions. The stomach was  entered, had normal distensibility. On retroflexion, the fundus and  cardia appeared normal.  The body and prepyloric antrum had mild  gastritis. There were prepyloric antral superficial ulcerations with no  evidence of bleeding in this location. Prepyloric antral biopsies were  obtained. The pylorus was patent. The duodenum was entered. In the  first portion of the duodenum, severe duodenitis was present along with  a large duodenal ulcer with a white clot. The likely source of the  patient's recent GI bleed and anemia. Second and third portions of the  duodenum were normal.  There was no residual shed blood and no active  bleeding. The stomach was decompressed by suction as the scope was  removed. A digital rectal exam was then performed. Sphincter tone was  normal.  Some perianal skin tags were present. No active hemorrhoids,  no strictures nor lesions. Formed brown stool in the lower rectal  vault. The patient was transferred to PACU in stable condition. SPECIMENS:  Prepyloric antral biopsies. DRAINS:  None. COMPLICATIONS:  None. DISPOSITION:  To PACU, awake, alert, and stable. Following recovery, we  will admit the patient for 23-hour observation, repeat H and H with  planned discharge in the morning, at which time we will discharge home  with gradual advancement of diet and activity as tolerated with  instructions for a bland diet, avoidance of ibuprofen for the time  being, prescriptions for Protonix and Carafate provided. Given that the  patient experiences no pain, we will plan repeat endoscopy in 3 to 6  months to evaluate for interval healing.   We will also recommend a  screening colonoscopy at that time.        Gideon Bellamy    D: 06/04/2021 14:36:20       T: 06/04/2021 14:45:47     [email or web address]  Job#: [id number]     Doc#: [id number]    CC:  Libby Washington

## 2021-06-04 NOTE — ED PROVIDER NOTES
MEQ extended release tablet Take 10 mEq by mouth daily      acetaminophen (TYLENOL) 325 MG tablet Take 2 tablets by mouth every 4 hours as needed for Pain or Fever 120 tablet 3    ibuprofen (ADVIL;MOTRIN) 200 MG tablet Take 400 mg by mouth every 6 hours as needed. ALLERGIES    Allergies   Allergen Reactions    Penicillin G Swelling     Throat swells shut  Other reaction(s): Anaphylaxis, Hives    Penicillins Swelling    Vicodin [Hydrocodone-Acetaminophen] Other (See Comments)     \"Makes me feel weird\"    Alendronate Nausea And Vomiting and Nausea Only    Hydrocodone        FAMILY HISTORY    History reviewed. No pertinent family history. SOCIAL HISTORY    Social History     Socioeconomic History    Marital status:      Spouse name: None    Number of children: None    Years of education: None    Highest education level: None   Occupational History    None   Tobacco Use    Smoking status: Never Smoker    Smokeless tobacco: Never Used   Vaping Use    Vaping Use: Never used   Substance and Sexual Activity    Alcohol use: No    Drug use: No    Sexual activity: None   Other Topics Concern    None   Social History Narrative    None     Social Determinants of Health     Financial Resource Strain:     Difficulty of Paying Living Expenses:    Food Insecurity:     Worried About Running Out of Food in the Last Year:     Ran Out of Food in the Last Year:    Transportation Needs:     Lack of Transportation (Medical):      Lack of Transportation (Non-Medical):    Physical Activity:     Days of Exercise per Week:     Minutes of Exercise per Session:    Stress:     Feeling of Stress :    Social Connections:     Frequency of Communication with Friends and Family:     Frequency of Social Gatherings with Friends and Family:     Attends Taoist Services:     Active Member of Clubs or Organizations:     Attends Club or Organization Meetings:     Marital Status:    Intimate Partner Violence:     Fear of Current or Ex-Partner:     Emotionally Abused:     Physically Abused:     Sexually Abused:            Review of Systems:  Constitutional: Positive for generalized weakness  Eyes:  Denies photophobia or discharge   HENT:  Denies sore throat or ear pain   Respiratory:  Denies cough or shortness of breath   Cardiovascular:  Denies chest pain, palpitations or swelling   GI: Positive for diarrhea and dark black stool  Musculoskeletal:  Denies back pain   Skin:  Denies rash   Neurologic:  Denies headache, focal weakness or sensory changes   Endocrine:  Denies polyuria or polydypsia   Lymphatic:  Denies swollen glands   Psychiatric:  Denies depression, suicidal ideation or homicidal ideation   All systems negative except as marked. PHYSICAL EXAM    VITAL SIGNS: BP (!) 172/81   Pulse 91   Temp 99.3 °F (37.4 °C) (Temporal)   Resp 20   Ht 5' 5\" (1.651 m)   Wt 163 lb (73.9 kg)   SpO2 100%   BMI 27.12 kg/m²    Constitutional: Elderly female no acute distress  HENT:  Normocephalic, Atraumatic, Bilateral external ears normal, Oropharynx moist, No oral exudates, Nose normal. Neck- Normal range of motion, No tenderness, Supple, No stridor. Eyes:  PERRL, EOMI, Conjunctiva normal, No discharge. Respiratory:  Normal breath sounds, No respiratory distress, No wheezing, No chest tenderness. Cardiovascular:  Normal heart rate, Normal rhythm, No murmurs, No rubs, No gallops. GI:  Bowel sounds normal, Soft, No tenderness, No masses, No pulsatile masses. Abdomen is soft with positive bowel sounds. Rectal exam with dark stool radially Hemoccult positive   : External genitalia appear normal, No masses or lesions. No discharge. No CVA tenderness. Musculoskeletal:  Intact distal pulses, No edema, No tenderness, No cyanosis, No clubbing. Good range of motion in all major joints. No tenderness to palpation or major deformities noted. Back- No tenderness.    Integument:  Warm, Dry, No erythema, No rash.   Lymphatic:  No lymphadenopathy noted. Neurologic:  Alert & oriented x 3, Normal motor function, Normal sensory function, No focal deficits noted. Psychiatric:  Affect normal, Judgment normal, Mood normal.     EKG        RADIOLOGY    No orders to display       PROCEDURES        Labs  Labs Reviewed   CBC WITH AUTO DIFFERENTIAL - Abnormal; Notable for the following components:       Result Value    RBC 3.32 (*)     Hemoglobin 10.1 (*)     Hematocrit 29.9 (*)     Seg Neutrophils 78 (*)     Lymphocytes 14 (*)     Segs Absolute 7.30 (*)     All other components within normal limits   COMPREHENSIVE METABOLIC PANEL - Abnormal; Notable for the following components:    Glucose 121 (*)     Chloride 110 (*)     Total Protein 5.7 (*)     Albumin 3.0 (*)     All other components within normal limits   LACTIC ACID, PLASMA - Abnormal; Notable for the following components:    Lactic Acid 2.4 (*)     All other components within normal limits   URINALYSIS - Abnormal; Notable for the following components:    Urine Hgb TRACE (*)     Leukocyte Esterase, Urine 3+ (*)     All other components within normal limits   MICROSCOPIC URINALYSIS - Abnormal; Notable for the following components:    Bacteria, UA 1+ (*)     All other components within normal limits   CULTURE, URINE   TYPE AND SCREEN             Summation      Patient Course: Patient is given IV fluids in ED. Patient has drop of hemoglobin. Patient is discussed with Hank Cho and Nevaeh Joshua. Patient will be admitted for further evaluation and EGD. Prior to the admission patient is in stable condition.   ED Medications administered this visit:    Medications   ciprofloxacin (CIPRO) IVPB 400 mg (has no administration in time range)   0.9 % sodium chloride bolus (1,000 mLs Intravenous New Bag 6/4/21 1020)   pantoprazole (PROTONIX) injection 40 mg (40 mg Intravenous Given 6/4/21 1146)     And   sodium chloride (PF) 0.9 % injection 10 mL (10 mLs Intravenous Given 6/4/21 3059)       New Prescriptions from this visit:    New Prescriptions    No medications on file       Follow-up:  No follow-up provider specified. Final Impression:   1. General weakness    2. Upper GI bleed    3. Diarrhea, unspecified type    4.  Urinary tract infection without hematuria, site unspecified               (Please note that portions of this note were completed with a voice recognition program.  Efforts were made to edit the dictations but occasionally words are mis-transcribed.)        Wyatt Richter MD  06/05/21 8668

## 2021-06-04 NOTE — H&P
Betty Olmstead is an 66 y.o.  female. Allergies: Allergies   Allergen Reactions    Penicillin G Swelling     Throat swells shut  Other reaction(s): Anaphylaxis, Hives    Penicillins Swelling    Vicodin [Hydrocodone-Acetaminophen] Other (See Comments)     \"Makes me feel weird\"    Alendronate Nausea And Vomiting and Nausea Only    Hydrocodone        Principal Problem:    Upper GI bleed  Resolved Problems:    * No resolved hospital problems. *    Blood pressure (!) 161/70, pulse 96, temperature 99.3 °F (37.4 °C), temperature source Temporal, resp. rate 16, height 5' 5\" (1.651 m), weight 163 lb (73.9 kg), SpO2 100 %. HPI    Ms Zeny Siddiqi is a pleasant 20-year-old white female presenting through [facility] with 5 days of diarrhea. Problems are typically formed and brown. Stool became dark over the last couple of days. WBC 9.4 on admission with H/H 10/30. This is down from H/H 15/44 less than 1 month ago, May 18. October 2018 H/H 10/31. Hemodynamically stable. No abdominal pain. Mild nausea without emesis. No history of peptic ulcer disease. She does not take any medication for acid blockade. Daily Plavix as well as ibuprofen. No recent weight changes, 163 pounds, BMI 27. No cough, fever no other respiratory symptoms. No history of COVID-19, rapid Covid negative today. Status post appendectomy and hysterectomy. No previous endoscopy. No family history of colon cancer to her knowledge. She has never smoked. At this time, she is resting comfortably no new complaints. Review of Systems   Constitutional: Positive for appetite change and fatigue. Negative for activity change, chills, fever and unexpected weight change. HENT: Negative for nosebleeds, sneezing, sore throat and trouble swallowing. Eyes: Negative for visual disturbance. Respiratory: Negative for cough, choking and shortness of breath. Cardiovascular: Negative for chest pain, palpitations and leg swelling. Gastrointestinal: Positive for blood in stool (Melena) and diarrhea. Negative for abdominal pain, nausea and vomiting. Genitourinary: Negative for dysuria, flank pain and hematuria. Musculoskeletal: Positive for arthralgias. Negative for back pain, gait problem and myalgias. Allergic/Immunologic: Negative for immunocompromised state. Neurological: Negative for dizziness, seizures, syncope, weakness and headaches. Hematological: Does not bruise/bleed easily. Psychiatric/Behavioral: Negative for confusion and sleep disturbance. Past Medical History:   Diagnosis Date    Dropfoot     left foot    Foot drop     left     Hypertension        Past Surgical History:   Procedure Laterality Date    ANKLE SURGERY      right    APPENDECTOMY      CATARACT REMOVAL      FRACTURE SURGERY      Pt has a plate and 8 screws in rt ankle    HIP FRACTURE SURGERY Right 10/12/2018    Intramedullary Dary placement [employer]    HYSTERECTOMY      DE OFFICE/OUTPT VISIT,PROCEDURE ONLY Right 10/12/2018    RIGHT HIP INTRAMEDULLAR DARY - [employer] CM Nail performed by Zachary Goff DO at [facility] [address] Left 2002       History reviewed. No pertinent family history. Allergies:  See list    No current facility-administered medications for this encounter.        Social History     Socioeconomic History    Marital status:      Spouse name: None    Number of children: None    Years of education: None    Highest education level: None   Occupational History    None   Tobacco Use    Smoking status: Never Smoker    Smokeless tobacco: Never Used   Vaping Use    Vaping Use: Never used   Substance and Sexual Activity    Alcohol use: No    Drug use: No    Sexual activity: None   Other Topics Concern    None   Social History Narrative    None     Social Determinants of Health     Financial Resource Strain:     Difficulty of Paying Living Expenses:    Food Insecurity:     Worried About Running Out of Food in the Last Year:     Pavithra of Food in the Last Year:    Transportation Needs:     Lack of Transportation (Medical):  Lack of Transportation (Non-Medical):    Physical Activity:     Days of Exercise per Week:     Minutes of Exercise per Session:    Stress:     Feeling of Stress :    Social Connections:     Frequency of Communication with Friends and Family:     Frequency of Social Gatherings with Friends and Family:     Attends Hindu Services:     Active Member of Clubs or Organizations:     Attends Club or Organization Meetings:     Marital Status:    Intimate Partner Violence:     Fear of Current or Ex-Partner:     Emotionally Abused:     Physically Abused:     Sexually Abused:        Physical Exam  Vitals and nursing note reviewed. Constitutional:       Appearance: She is well-developed. HENT:      Head: Normocephalic and atraumatic. Eyes:      General: No scleral icterus. Conjunctiva/sclera: Conjunctivae normal.      Pupils: Pupils are equal, round, and reactive to light. Neck:      Vascular: No JVD. Trachea: No tracheal deviation. Cardiovascular:      Rate and Rhythm: Normal rate and regular rhythm. Pulmonary:      Effort: Pulmonary effort is normal. No respiratory distress. Chest:      Chest wall: No tenderness. Abdominal:      General: There is no distension. Palpations: Abdomen is soft. There is no mass. Tenderness: There is no abdominal tenderness. There is no guarding or rebound. Musculoskeletal:         General: Normal range of motion. Cervical back: Normal range of motion and neck supple. Lymphadenopathy:      Cervical: No cervical adenopathy. Skin:     General: Skin is warm and dry. Findings: No erythema or rash. Neurological:      Mental Status: She is alert and oriented to person, place, and time. Cranial Nerves: No cranial nerve deficit.    Psychiatric:         Behavior: Behavior normal. [facility]   Lymphocytes 13Low   15 - 40 % Final 05/18/2021 11:45 AM [address] [facility]   Monocytes 7  4 - 8 % Final 05/18/2021 11:45 AM [address] [facility]   Eosinophils % 1  0 - 5 % Final 05/18/2021 11:45 AM [address] [facility]   Basophils 1  0 - 2 % Final 05/18/2021 11:45 AM [address] [facility]   Immature Granulocytes NOT REPORTED  0 % Final 05/18/2021 11:45 AM [address] [facility]   Segs Absolute 7. 10High   2.5 - 7.0 k/uL Final 05/18/2021 11:45 AM [address] [facility]   Absolute Lymph # 1.20  1.0 - 4.8 k/uL Final 05/18/2021 11:45 AM [address] [facility]   Absolute Mono # 0.60  0.0 - 1.0 k/uL Final 05/18/2021 11:45 AM [address] [facility]   Absolute Eos # 0.00  0.0 - 0.4 k/uL Final 05/18/2021 11:45 AM [facility]   Basophils Absolute 0.10  0.0 - 0.2 k/uL Final 05/18/2021 11:45 AM [facility]   Absolute Immature Granulocyte NOT REPORTED  0.00 - 0.30 k/uL Final 05/18/2021 11:45 AM [address] [facility]   WBC Morphology NOT REPORTED   Final 05/18/2021 11:45 AM [address] [facility]   [facility] Morphology NOT REPORTED   Final 05/18/2021 11:45 AM [facility]   Platelet Estimate NOT REPORTED   Final 05/18/2021 11:45 AM [facility]   Testing Performed By    Chelsie Branham Name Director Address Valid Date Range   [facility] Jonel Solis M.D. [address]   [facility] [id number] 12/17/20 0000-Present   Lab and Collection    CBC Auto Differential - 5/18/2021  Result History    CBC Auto Differential on 5/18/2021       Assessment:    67 yo WF with melanotic stools, anemia. Plan:    Discussed with Ms Lukasz Mccray her recent diarrhea, melena, fatigue, anemia. We will proceed with diagnostic EGD at this time. Risks, benefits, alternatives thoroughly reviewed and accepted by Ms. Victor, including GI bleeding, perforation, missed lesions, COVID-19 exposure/infection, etc.  OR staff mobilized.     Rk Lou MD  6/4/2021

## 2021-06-04 NOTE — PLAN OF CARE
Problem: Infection:  Goal: Will remain free from infection  Description: Will remain free from infection  Outcome: Ongoing     Problem: Safety:  Goal: Free from accidental physical injury  Description: Free from accidental physical injury  Outcome: Ongoing  Goal: Free from intentional harm  Description: Free from intentional harm  Outcome: Ongoing     Problem: Daily Care:  Goal: Daily care needs are met  Description: Daily care needs are met  Outcome: Ongoing     Problem: Pain:  Goal: Patient's pain/discomfort is manageable  Description: Patient's pain/discomfort is manageable  Outcome: Ongoing     Problem:  Activity:  Goal: Ability to tolerate increased activity will improve  Description: Ability to tolerate increased activity will improve  Outcome: Ongoing     Problem: Cardiac:  Goal: Ability to achieve and maintain adequate cardiopulmonary perfusion will improve  Description: Ability to achieve and maintain adequate cardiopulmonary perfusion will improve  Outcome: Ongoing     Problem: Physical Regulation:  Goal: Will show no signs and symptoms of excessive bleeding  Description: Will show no signs and symptoms of excessive bleeding  Outcome: Ongoing  Goal: Complications related to the disease process, condition or treatment will be avoided or minimized  Description: Complications related to the disease process, condition or treatment will be avoided or minimized  Outcome: Ongoing     Problem: Sensory:  Goal: Pain level will decrease  Description: Pain level will decrease  Outcome: Ongoing     Problem: Skin Integrity:  Goal: Skin integrity will improve  Description: Skin integrity will improve  Outcome: Ongoing     Problem: Urinary Elimination:  Goal: Complications related to the disease process, condition or treatment will be avoided or minimized  Description: Complications related to the disease process, condition or treatment will be avoided or minimized  Outcome: Ongoing

## 2021-06-04 NOTE — PROGRESS NOTES
Pt to room, pt ambulates with steady gait to bed. Pt denies any complaints of pain or other complaints at this time. IV infusing without difficulty. Pt instructed on use of bed and call light with verbalized understanding. Granddaughter at bedside, then departs.

## 2021-06-04 NOTE — BRIEF OP NOTE
Brief Postoperative Note      Patient: Justice Robison  YOB: 1945  MRN: [id number]    Date of Procedure: 6/4/2021    Pre-Op Diagnosis:      1.  GI bleed      2. Anemia    Post-Op Diagnosis:      1. Hiatal hernia with Schatzki's ring     2. Antral gastritis with superficial ulcerations     3. Large duodenal ulcer (D1) with white clot       Operation:     1. EGD     2. Antral biopsies     Surgeon(s):  Pio Barrett MD    Assistant:  * No surgical staff found *    Anesthesia: Monitor Anesthesia Care    Estimated Blood Loss (mL): less than 20 cc    Complications: None    Specimens:   ID Type Source Tests Collected by Time Destination   1 : H PYLORI DETECTION Tissue Stomach H. PYLORI DETECTION Pio Barrett MD 6/4/2021 1412    A : ANTRUM BIOPSY X 3 Tissue Stomach SURGICAL PATHOLOGY Pio Barrett MD 6/4/2021 1413      Findings:  As above.     Dictated # [id number]    Electronically signed by Pio Barrett MD on 6/4/2021 at 2:27 PM

## 2021-06-04 NOTE — ANESTHESIA PRE PROCEDURE
Department of Anesthesiology  Preprocedure Note       Name:  Tevin Schwarz   Age:  66 y.o.  :  1945                                          MRN:  [id number]         Date:  2021      Surgeon: Ok Floor):  Jenni Peabody, MD    Procedure: Procedure(s):  EGD ESOPHAGOGASTRODUODENOSCOPY    Medications prior to admission:   Prior to Admission medications    Medication Sig Start Date End Date Taking? Authorizing Provider   clopidogrel (PLAVIX) 75 MG tablet Take 75 mg by mouth daily 21  Yes Historical Provider, MD   traMADol (ULTRAM) 50 MG tablet Take 50 mg by mouth as needed. 10/1/20  Yes Historical Provider, MD   enalapril (VASOTEC) 20 MG tablet Take 40 mg by mouth daily 20  Yes Historical Provider, MD   atorvastatin (LIPITOR) 80 MG tablet Take 80 mg by mouth daily 21  Yes Historical Provider, MD   potassium chloride (KLOR-CON) 10 MEQ extended release tablet Take 10 mEq by mouth daily   Yes Historical Provider, MD   acetaminophen (TYLENOL) 325 MG tablet Take 2 tablets by mouth every 4 hours as needed for Pain or Fever 10/19/18   Wisam Pearson MD   ibuprofen (ADVIL;MOTRIN) 200 MG tablet Take 400 mg by mouth every 6 hours as needed. Historical Provider, MD       Current medications:    No current facility-administered medications for this encounter. Allergies: Allergies   Allergen Reactions    Penicillin G Swelling     Throat swells shut  Other reaction(s):  Anaphylaxis, Hives    Penicillins Swelling    Vicodin [Hydrocodone-Acetaminophen] Other (See Comments)     \"Makes me feel weird\"    Alendronate Nausea And Vomiting and Nausea Only    Hydrocodone        Problem List:    Patient Active Problem List   Diagnosis Code    Closed intertrochanteric fracture of right femur ([address] S72.141A    Closed displaced intertrochanteric fracture of right femur ([address] S72.141A    Upper GI bleed K92.2       Past Medical History:        Diagnosis Date    Dropfoot     left foot    Foot drop     left  Hypertension        Past Surgical History:        Procedure Laterality Date    ANKLE SURGERY      right    APPENDECTOMY      CATARACT REMOVAL      FRACTURE SURGERY      Pt has a plate and 8 screws in rt ankle    HIP FRACTURE SURGERY Right 10/12/2018    Intramedullary Dary placement [employer]    HYSTERECTOMY      [place] OFFICE/OUTPT VISIT,PROCEDURE ONLY Right 10/12/2018    RIGHT HIP INTRAMEDULLAR DARY - [employer] CM Nail performed by Johana Leblanc DO at [facility] [address] Left 2002       Social History:    Social History     Tobacco Use    Smoking status: Never Smoker    Smokeless tobacco: Never Used   Substance Use Topics    Alcohol use: No                                Counseling given: Not Answered      Vital Signs (Current):   Vitals:    06/04/21 1054 06/04/21 1114 06/04/21 1227 06/04/21 1254   BP: (!) 153/87 (!) 150/65 (!) 151/88 (!) 161/70   Pulse: 86 82 83 96   Resp: 18 18 15 16   Temp:       TempSrc:       SpO2: 100% 100% 99% 100%   Weight:       Height:                                                  BP Readings from Last 3 Encounters:   06/04/21 (!) 161/70   05/18/21 (!) 194/96   10/22/18 (!) 154/79       NPO Status: Time of last liquid consumption: 0530                        Time of last solid consumption: 0530                        Date of last liquid consumption: 06/04/21                        Date of last solid food consumption: 06/04/21    BMI:   Wt Readings from Last 3 Encounters:   06/04/21 163 lb (73.9 kg)   05/18/21 166 lb 1.6 oz (75.3 kg)   10/20/18 177 lb 6.4 oz (80.5 kg)     Body mass index is 27.12 kg/m².     CBC:   Lab Results   Component Value Date    WBC 9.4 06/04/2021    RBC 3.32 06/04/2021    HGB 10.1 06/04/2021    HCT 29.9 06/04/2021    MCV 90.1 06/04/2021    RDW 15.2 06/04/2021     06/04/2021       CMP:   Lab Results   Component Value Date     06/04/2021    K 3.7 06/04/2021     06/04/2021    CO2 24 06/04/2021    BUN 13 06/04/2021    CREATININE 0.64 06/04/2021    GFRAA >60 06/04/2021    LABGLOM >60 06/04/2021    GLUCOSE 121 06/04/2021    PROT 5.7 06/04/2021    CALCIUM 8.6 06/04/2021    BILITOT 0.35 06/04/2021    ALKPHOS 74 06/04/2021    AST 21 06/04/2021    ALT 16 06/04/2021       POC Tests: No results for input(s): POCGLU, POCNA, POCK, POCCL, POCBUN, POCHEMO, POCHCT in the last 72 hours. Coags:   Lab Results   Component Value Date    PROTIME 10.4 10/11/2018    INR 1.1 10/11/2018       HCG (If Applicable): No results found for: PREGTESTUR, PREGSERUM, HCG, HCGQUANT     ABGs: No results found for: PHART, PO2ART, OCD0WKA, IYY3HCO, BEART, R1PVWJWD     Type & Screen (If Applicable):  No results found for: LABABO, LABRH    Drug/Infectious Status (If Applicable):  No results found for: HIV, HEPCAB    COVID-19 Screening (If Applicable):   Lab Results   Component Value Date    COVID19 Not Detected 06/04/2021           Anesthesia Evaluation  Patient summary reviewed and Nursing notes reviewed  Airway: Mallampati: II  TM distance: >3 FB   Neck ROM: limited  Mouth opening: < 3 FB Dental:    (+) implants and caps  Comment: Generally poor repair with multiple caps in incisors and k9's    Pulmonary:Negative Pulmonary ROS breath sounds clear to auscultation                             Cardiovascular:  Exercise tolerance: no interval change,   (+) hypertension:, valvular problems/murmurs (systolic murmur present upon auscultation):,       ECG reviewed  Rhythm: irregular  Rate: normal  Echocardiogram reviewed                  Neuro/Psych:   Negative Neuro/Psych ROS  (+) TIA (patient states history of multiple TIA's with no permanent residual affects),              ROS comment: History states hip and ankle surgery and foot drop GI/Hepatic/Renal: Neg GI/Hepatic/Renal ROS           ROS comment: GI bleed noted as a diagnosis however pt states that she doesn't have hemoptasis.    Endo/Other: Negative Endo/Other ROS                    Abdominal: Abdomen: soft. Vascular: negative vascular ROS. Anesthesia Plan      MAC     ASA 2       Induction: intravenous. Anesthetic plan and risks discussed with patient. Plan discussed with attending.                   LORI Multani - CRNA   6/4/2021

## 2021-06-04 NOTE — PROGRESS NOTES
4 oz ensure enlive added TID with meals due to full liquid diet.    Charu Michelle, MARY, LD 6/4/2021 3:49 PM

## 2021-06-05 VITALS
HEIGHT: 65 IN | WEIGHT: 164.9 LBS | HEART RATE: 79 BPM | OXYGEN SATURATION: 97 % | RESPIRATION RATE: 16 BRPM | TEMPERATURE: 99.3 F | DIASTOLIC BLOOD PRESSURE: 85 MMHG | BODY MASS INDEX: 27.47 KG/M2 | SYSTOLIC BLOOD PRESSURE: 144 MMHG

## 2021-06-05 LAB
ABSOLUTE EOS #: 0.2 K/UL (ref 0–0.4)
ABSOLUTE IMMATURE GRANULOCYTE: ABNORMAL K/UL (ref 0–0.3)
ABSOLUTE LYMPH #: 1.7 K/UL (ref 1–4.8)
ABSOLUTE MONO #: 0.7 K/UL (ref 0–1)
ANION GAP SERPL CALCULATED.3IONS-SCNC: 6 MMOL/L (ref 9–17)
BASOPHILS # BLD: 1 % (ref 0–2)
BASOPHILS ABSOLUTE: 0 K/UL (ref 0–0.2)
BUN BLDV-MCNC: 8 MG/DL (ref 8–23)
BUN/CREAT BLD: 12 (ref 9–20)
CALCIUM SERPL-MCNC: 7.9 MG/DL (ref 8.6–10.4)
CHLORIDE BLD-SCNC: 109 MMOL/L (ref 98–107)
CO2: 26 MMOL/L (ref 20–31)
CREAT SERPL-MCNC: 0.69 MG/DL (ref 0.5–0.9)
CULTURE: NORMAL
DIFFERENTIAL TYPE: YES
DIRECT EXAM: NEGATIVE
EOSINOPHILS RELATIVE PERCENT: 3 % (ref 0–5)
GFR AFRICAN AMERICAN: >60 ML/MIN
GFR NON-AFRICAN AMERICAN: >60 ML/MIN
GFR SERPL CREATININE-BSD FRML MDRD: ABNORMAL ML/MIN/{1.73_M2}
GFR SERPL CREATININE-BSD FRML MDRD: ABNORMAL ML/MIN/{1.73_M2}
GLUCOSE BLD-MCNC: 94 MG/DL (ref 70–99)
HCT VFR BLD CALC: 23.8 % (ref 36–46)
HCT VFR BLD CALC: 25.6 % (ref 36–46)
HCT VFR BLD CALC: 25.8 % (ref 36–46)
HEMOGLOBIN: 8.1 G/DL (ref 12–16)
HEMOGLOBIN: 8.7 G/DL (ref 12–16)
HEMOGLOBIN: 8.8 G/DL (ref 12–16)
IMMATURE GRANULOCYTES: ABNORMAL %
IRON SATURATION: 9 % (ref 20–55)
IRON: 23 UG/DL (ref 37–145)
LYMPHOCYTES # BLD: 25 % (ref 15–40)
Lab: NORMAL
Lab: NORMAL
MAGNESIUM: 1.7 MG/DL (ref 1.6–2.6)
MCH RBC QN AUTO: 30.5 PG (ref 26–34)
MCHC RBC AUTO-ENTMCNC: 34 G/DL (ref 31–37)
MCV RBC AUTO: 89.7 FL (ref 80–100)
MONOCYTES # BLD: 10 % (ref 4–8)
NRBC AUTOMATED: ABNORMAL PER 100 WBC
PDW BLD-RTO: 15.2 % (ref 12.1–15.2)
PLATELET # BLD: 210 K/UL (ref 140–450)
PLATELET ESTIMATE: ABNORMAL
PMV BLD AUTO: ABNORMAL FL (ref 6–12)
POTASSIUM SERPL-SCNC: 3.2 MMOL/L (ref 3.7–5.3)
RBC # BLD: 2.85 M/UL (ref 4–5.2)
RBC # BLD: ABNORMAL 10*6/UL
SEG NEUTROPHILS: 61 % (ref 47–75)
SEGMENTED NEUTROPHILS ABSOLUTE COUNT: 4.3 K/UL (ref 2.5–7)
SODIUM BLD-SCNC: 141 MMOL/L (ref 135–144)
SPECIMEN DESCRIPTION: NORMAL
SPECIMEN DESCRIPTION: NORMAL
TOTAL IRON BINDING CAPACITY: 251 UG/DL (ref 250–450)
UNSATURATED IRON BINDING CAPACITY: 228 UG/DL (ref 112–347)
WBC # BLD: 7 K/UL (ref 3.5–11)
WBC # BLD: ABNORMAL 10*3/UL

## 2021-06-05 PROCEDURE — C9113 INJ PANTOPRAZOLE SODIUM, VIA: HCPCS | Performed by: INTERNAL MEDICINE

## 2021-06-05 PROCEDURE — G0378 HOSPITAL OBSERVATION PER HR: HCPCS

## 2021-06-05 PROCEDURE — 83540 ASSAY OF IRON: CPT

## 2021-06-05 PROCEDURE — 80048 BASIC METABOLIC PNL TOTAL CA: CPT

## 2021-06-05 PROCEDURE — 85025 COMPLETE CBC W/AUTO DIFF WBC: CPT

## 2021-06-05 PROCEDURE — 94761 N-INVAS EAR/PLS OXIMETRY MLT: CPT

## 2021-06-05 PROCEDURE — 83735 ASSAY OF MAGNESIUM: CPT

## 2021-06-05 PROCEDURE — 36415 COLL VENOUS BLD VENIPUNCTURE: CPT

## 2021-06-05 PROCEDURE — 85014 HEMATOCRIT: CPT

## 2021-06-05 PROCEDURE — 83550 IRON BINDING TEST: CPT

## 2021-06-05 PROCEDURE — 6360000002 HC RX W HCPCS: Performed by: INTERNAL MEDICINE

## 2021-06-05 PROCEDURE — 6370000000 HC RX 637 (ALT 250 FOR IP): Performed by: SURGERY

## 2021-06-05 PROCEDURE — 96376 TX/PRO/DX INJ SAME DRUG ADON: CPT

## 2021-06-05 PROCEDURE — 6370000000 HC RX 637 (ALT 250 FOR IP): Performed by: INTERNAL MEDICINE

## 2021-06-05 PROCEDURE — 85018 HEMOGLOBIN: CPT

## 2021-06-05 RX ORDER — POTASSIUM CHLORIDE 750 MG/1
40 TABLET, FILM COATED, EXTENDED RELEASE ORAL ONCE
Status: COMPLETED | OUTPATIENT
Start: 2021-06-05 | End: 2021-06-05

## 2021-06-05 RX ORDER — FERROUS SULFATE 325(65) MG
325 TABLET ORAL 2 TIMES DAILY
Qty: 60 TABLET | Refills: 1 | Status: SHIPPED | OUTPATIENT
Start: 2021-06-05 | End: 2022-05-03

## 2021-06-05 RX ORDER — LEVOFLOXACIN 250 MG/1
250 TABLET ORAL DAILY
Qty: 6 TABLET | Refills: 0 | Status: SHIPPED | OUTPATIENT
Start: 2021-06-06 | End: 2021-06-09

## 2021-06-05 RX ADMIN — LEVOFLOXACIN 250 MG: 500 TABLET, FILM COATED ORAL at 07:34

## 2021-06-05 RX ADMIN — SUCRALFATE 1 G: 1 TABLET ORAL at 04:39

## 2021-06-05 RX ADMIN — PANTOPRAZOLE SODIUM 40 MG: 40 INJECTION, POWDER, FOR SOLUTION INTRAVENOUS at 07:34

## 2021-06-05 RX ADMIN — POTASSIUM CHLORIDE 40 MEQ: 750 TABLET, FILM COATED, EXTENDED RELEASE ORAL at 09:26

## 2021-06-05 RX ADMIN — POTASSIUM CHLORIDE 10 MEQ: 750 TABLET, FILM COATED, EXTENDED RELEASE ORAL at 07:34

## 2021-06-05 ASSESSMENT — PAIN SCALES - GENERAL: PAINLEVEL_OUTOF10: 0

## 2021-06-05 NOTE — PROGRESS NOTES
Discharge instructions provided; pt verbalizes understanding. PIV site discontinued with catheter tip intact, insertion site dressed with 2x2 and secured with tape.

## 2021-06-05 NOTE — H&P
History & Physical    Patient: Fernanda Keenan  YOB: 1945  Date of Service: 6/5/2021  MRN: [id number]   Acct:   [de-identified]   Primary Care Physician: Simmie Severin, MD    Chief Complaint:   Chief Complaint   Patient presents with    Diarrhea     Pt c/o diarrhea all week, worse on monday and tuesday really dark in color with specks of blood, Stools have gotten more formed and more normal in color.  Fatigue     Pt came here for weakness and shakiness vs going to see Dr. Chloe Olvera at 11am today       History of Present Illness: The patient is a 66 y.o. female presented to the emergency room complaining of diarrhea, black stools, weakness. The patient states that she developed profuse diarrhea last Monday. The stools have been dark, black. Sometimes she saw speckles of red blood in it. She had no associated abdominal pain, no nausea or vomiting. She believes that she had some sort of stomach flu. She contacted her primary care physician but by then her symptoms for getting worse. She developed fatigue, generalized weakness and decided to come to the emergency room for evaluation. The patient states that last week her aspirin that she was taking for a history of \"mini stroke\" was switched to Plavix for better stroke prevention. She also states that she has a history of chronic low back pain and dropfoot and has been taking 10-12 ibuprofen pills every day for long time. She has no history of GI bleed, ulcers in the past.  Patient work-up in the emergency room revealed stable vitals. BMP was essentially normal, lactic acid was elevated 2.4. LFTs revealed albumin 3.0, otherwise normal.  WBC was 9.4, hemoglobin was 10.1 which was significant drop from 14.7 on 5/18/2021. Platelet count was normal at 257. UA revealed 3+ leukocyte esterase, negative nitrates, 5-10 WBCs and 1+ bacteria. Patient was admitted for further work-up of upper GI bleed and acute blood loss anemia.   She was seen of motion, no joint swelling, deformity or tenderness  Neurological: alert, oriented, normal speech, no focal findings or movement disorder noted    Review of Labs and Diagnostic Testing:    Recent Results (from the past 24 hour(s))   CBC Auto Differential    Collection Time: 06/04/21 10:13 AM   Result Value Ref Range    WBC 9.4 3.5 - 11.0 k/uL    RBC 3.32 (L) 4.0 - 5.2 m/uL    Hemoglobin 10.1 (L) 12.0 - 16.0 g/dL    Hematocrit 29.9 (L) 36 - 46 %    MCV 90.1 80 - 100 fL    MCH 30.5 26 - 34 pg    MCHC 33.8 31 - 37 g/dL    RDW 15.2 12.1 - 15.2 %    Platelets 524 161 - 060 k/uL    MPV NOT REPORTED 6.0 - 12.0 fL    NRBC Automated NOT REPORTED per 100 WBC    Differential Type YES     Seg Neutrophils 78 (H) 47 - 75 %    Lymphocytes 14 (L) 15 - 40 %    Monocytes 7 4 - 8 %    Eosinophils % 1 0 - 5 %    Basophils 0 0 - 2 %    Immature Granulocytes NOT REPORTED 0 %    Segs Absolute 7.30 (H) 2.5 - 7.0 k/uL    Absolute Lymph # 1.30 1.0 - 4.8 k/uL    Absolute Mono # 0.70 0.0 - 1.0 k/uL    Absolute Eos # 0.10 0.0 - 0.4 k/uL    Basophils Absolute 0.00 0.0 - 0.2 k/uL    Absolute Immature Granulocyte NOT REPORTED 0.00 - 0.30 k/uL    WBC Morphology NOT REPORTED     RBC Morphology NOT REPORTED     Platelet Estimate NOT REPORTED    Comprehensive Metabolic Panel    Collection Time: 06/04/21 10:13 AM   Result Value Ref Range    Glucose 121 (H) 70 - 99 mg/dL    BUN 13 8 - 23 mg/dL    CREATININE 0.64 0.50 - 0.90 mg/dL    Bun/Cre Ratio 20 9 - 20    Calcium 8.6 8.6 - 10.4 mg/dL    Sodium 143 135 - 144 mmol/L    Potassium 3.7 3.7 - 5.3 mmol/L    Chloride 110 (H) 98 - 107 mmol/L    CO2 24 20 - 31 mmol/L    Anion Gap 9 9 - 17 mmol/L    Alkaline Phosphatase 74 35 - 104 U/L    ALT 16 5 - 33 U/L    AST 21 <32 U/L    Total Bilirubin 0.35 0.30 - 1.20 mg/dL    Total Protein 5.7 (L) 6.4 - 8.3 g/dL    Albumin 3.0 (L) 3.5 - 5.2 g/dL    Albumin/Globulin Ratio NOT REPORTED 1.0 - 2.5    GFR Non-African American >60 >60 mL/min    GFR African American >60 Not Detected Not Detected   Hemoglobin and Hematocrit, Blood    Collection Time: 06/04/21  6:00 PM   Result Value Ref Range    Hemoglobin 8.8 (L) 12.0 - 16.0 g/dL    Hematocrit 26.8 (L) 36 - 46 %   Hemoglobin and hematocrit, blood    Collection Time: 06/04/21  8:45 PM   Result Value Ref Range    Hemoglobin 8.8 (L) 12.0 - 16.0 g/dL    Hematocrit 25.8 (L) 36 - 46 %   Hemoglobin and Hematocrit, Blood    Collection Time: 06/05/21 12:00 AM   Result Value Ref Range    Hemoglobin 8.1 (L) 12.0 - 16.0 g/dL    Hematocrit 23.8 (L) 36 - 46 %   Hemoglobin and hematocrit, blood    Collection Time: 06/05/21  2:49 AM   Result Value Ref Range    Hemoglobin 8.8 (L) 12.0 - 16.0 g/dL    Hematocrit 25.8 (L) 36 - 46 %   Basic Metabolic Panel    Collection Time: 06/05/21  5:00 AM   Result Value Ref Range    Glucose 94 70 - 99 mg/dL    BUN 8 8 - 23 mg/dL    CREATININE 0.69 0.50 - 0.90 mg/dL    Bun/Cre Ratio 12 9 - 20    Calcium 7.9 (L) 8.6 - 10.4 mg/dL    Sodium 141 135 - 144 mmol/L    Potassium 3.2 (L) 3.7 - 5.3 mmol/L    Chloride 109 (H) 98 - 107 mmol/L    CO2 26 20 - 31 mmol/L    Anion Gap 6 (L) 9 - 17 mmol/L    GFR Non-African American >60 >60 mL/min    GFR African American >60 >60 mL/min    GFR Comment          GFR Staging NOT REPORTED    Magnesium    Collection Time: 06/05/21  5:00 AM   Result Value Ref Range    Magnesium 1.7 1.6 - 2.6 mg/dL   CBC auto differential    Collection Time: 06/05/21  5:00 AM   Result Value Ref Range    WBC 7.0 3.5 - 11.0 k/uL    RBC 2.85 (L) 4.0 - 5.2 m/uL    Hemoglobin 8.7 (L) 12.0 - 16.0 g/dL    Hematocrit 25.6 (L) 36 - 46 %    MCV 89.7 80 - 100 fL    MCH 30.5 26 - 34 pg    MCHC 34.0 31 - 37 g/dL    RDW 15.2 12.1 - 15.2 %    Platelets 844 096 - 818 k/uL    MPV NOT REPORTED 6.0 - 12.0 fL    NRBC Automated NOT REPORTED per 100 WBC    Differential Type YES     Seg Neutrophils 61 47 - 75 %    Lymphocytes 25 15 - 40 %    Monocytes 10 (H) 4 - 8 %    Eosinophils % 3 0 - 5 %    Basophils 1 0 - 2 %    Immature Granulocytes NOT REPORTED 0 %    Segs Absolute 4.30 2.5 - 7.0 k/uL    Absolute Lymph # 1.70 1.0 - 4.8 k/uL    Absolute Mono # 0.70 0.0 - 1.0 k/uL    Absolute Eos # 0.20 0.0 - 0.4 k/uL    Basophils Absolute 0.00 0.0 - 0.2 k/uL    Absolute Immature Granulocyte NOT REPORTED 0.00 - 0.30 k/uL    WBC Morphology NOT REPORTED     RBC Morphology NOT REPORTED     Platelet Estimate NOT REPORTED        Radiology:     No results found. Assessment/ Plan:    1. Upper GI bleed secondary to large duodenal ulcer -status post EGD. She was treated with IV Protonix, was on clear diet. She is hemodynamically stable. Appreciate 's help. She will be on PPIs and Carafate. She is strongly advised to avoid NSAIDs, stop Plavix. She will follow up with Ovidio Araujo in about 2 weeks  2. Acute blood loss anemia -H&H dropped, however she does not require transfusion at this time. She is asymptomatic. GI bleed resolving  3. Hypokalemia -will replace  4. UTI -started on oral Levaquin  5. Hypertension  6. Hyperlipidemia    CODE STATUS full      Advanced Care Plan   (x )  I confirmed that the patient's Advanced Care Plan is present, code status documented, or surrogate decision maker is listed in the patient's medical record. ( )  The patient's advanced care plan is not present because:  (select)   ( ) I confirmed today that the patient does not wish or was not able to name a surrogate decision maker or provide an [address] ( ) Hospice care is currently being provided or has been provided this calender year. ( )  I did not confirm today the presence of an [address] or surrogate decision maker documented within the patient's medical record. (Does not satisfy [facility] performance). Documentation of Current Medications in the Medical Record   (x )  I have utilized all available immediate resources to obtain, update, or review the patient's current medications.   If Yes, Stop Here  ( ) The patient is not eligible for medications reconciliation; the patient is in an emergent medical situation where delaying treatment would jeopardize the patient's health. ( ) I did not confirm, update or review the patient's current list of medications today. (does not satisfy [facility] performance)        Medical Necessity: Inpatient admission is appropriate for this patient secondary to the need of IV fluids, IV Protonix, monitoring H&H, EGD    Estimated length of stay: 2  days. The beneficiary may reasonably be expected to be discharged or transferred to a hospital within 96 hours after admission.     DVT prophylaxis:   [] Lovenox   [x] SCDs   [] SQ Heparin   [] Encourage ambulation, low risk for DVT, no chemical or mechanical    prophylaxis necessary      [] Already on Anticoagulation    Anticipated Disposition upon discharge:   [x] Home   [] Home with Home Health   [] [facility]   [] [facility] [address]      Electronically signed by Otilia Lopez MD on 6/5/2021 at 8:59 AM

## 2021-06-05 NOTE — DISCHARGE SUMMARY
Hospitalist Discharge Summary    Patient: Maldonado Valdez  YOB: 1945    MRN: [id number]   Acct: [de-identified]    Primary Care Physician: Arnold Wong MD    Admit date:  6/4/2021    Discharge date:  6/5/2021       Discharge Diagnoses:    1. Upper GI bleed secondary to large duodenal ulcer  2. Acute blood loss anemia  3. UTI  4. Hypokalemia  5. Hypertension  6. Hyperlipidemia  7. Chronic low back pain, chronic NSAID use          Discharge Medications:       Debo Hurtado   [place] Medication Instructions [facility]:[id number]    Printed on:06/05/21 9883   Medication Information                      acetaminophen (TYLENOL) 325 MG tablet  Take 2 tablets by mouth every 4 hours as needed for Pain or Fever             atorvastatin (LIPITOR) 80 MG tablet  Take 80 mg by mouth daily             enalapril (VASOTEC) 20 MG tablet  Take 40 mg by mouth daily             ferrous sulfate (IRON 325) 325 (65 Fe) MG tablet  Take 1 tablet by mouth 2 times daily             levoFLOXacin (LEVAQUIN) 250 MG tablet  Take 1 tablet by mouth daily for 3 days             pantoprazole (PROTONIX) 40 MG tablet  Take 1 tablet by mouth daily             potassium chloride (KLOR-CON) 10 MEQ extended release tablet  Take 10 mEq by mouth daily             sucralfate (CARAFATE) 1 GM/10ML suspension  Take 10 mLs by mouth 4 times daily May substitute 1 g Carafate tablets if pharmacy staff instruct patient how to create slurry at home. traMADol (ULTRAM) 50 MG tablet  Take 50 mg by mouth as needed. Diet:  ADULT DIET; Full Liquid  Adult Oral Nutrition Supplement; Standard High Calorie/High Protein Oral Supplement    Activity: Resume prehospital activities    Follow-up:  in 7 days with Arnold Wong MD, follow-up with general surgery Nunez Butt in 1-2 weeks    Consultants: General surgery     Procedures: EGD on 6/4/2021 revealing:         POSTOPERATIVE DIAGNOSES:  1.   Hiatal hernia with chronic low back pain and dropfoot and has been taking 10-12 ibuprofen pills every day for long time. She has no history of GI bleed, ulcers in the past.  Patient work-up in the emergency room revealed stable vitals. BMP was essentially normal, lactic acid was elevated 2.4. LFTs revealed albumin 3.0, otherwise normal.  WBC was 9.4, hemoglobin was 10.1 which was significant drop from 14.7 on 5/18/2021. Platelet count was normal at 257. UA revealed 3+ leukocyte esterase, negative nitrates, 5-10 WBCs and 1+ bacteria. Patient was admitted for further work-up of upper GI bleed and acute blood loss anemia. She was seen by general surgeon Torito Santana and taken to the OR for EGD. EGD revealed hiatal hernia with widely patent Schatzki ring, antral gastritis with superficial ulcerations and a large duodenal ulcer with white clot. Likely source of her anemia was believed to be the duodenal ulcer. We closely monitored her H&H. She did not require blood transfusion. She received IV fluids, potassium was replaced. Was clear diet. The patient states that she had several bowel movements and stools are brownish now. She has no other complaints this morning. She denies having any abdominal pain, epigastric pain, has no nausea or vomiting. Will discharge home with Protonix, Carafate, ferrous sulfate 325 mg twice daily. Also prescription for Levaquin for her UTI. She is to follow-up with Torito Santana in about 1 to 2 weeks, possibly need repeat EGD in the future.       Disposition: home    Condition: Stable    Time Spent: Less than 30 minutes      Electronically signed by Inna Mota MD on 6/5/2021 at 9:35 AM  Discharging Hospitalist

## 2021-06-05 NOTE — PROGRESS NOTES
Pt has been up and down to BR and then back to bed. Her gait is steady. Denies dizziness or light headedness. Voiding ample amount of yellow urine, she states. No BM's thus far this shift.

## 2021-06-06 LAB
EKG ATRIAL RATE: 87 BPM
EKG P AXIS: 74 DEGREES
EKG P-R INTERVAL: 194 MS
EKG Q-T INTERVAL: 386 MS
EKG QRS DURATION: 86 MS
EKG QTC CALCULATION (BAZETT): 464 MS
EKG R AXIS: 29 DEGREES
EKG T AXIS: 111 DEGREES
EKG VENTRICULAR RATE: 87 BPM

## 2021-06-06 PROCEDURE — 93010 ELECTROCARDIOGRAM REPORT: CPT | Performed by: INTERNAL MEDICINE

## 2021-06-08 LAB — SURGICAL PATHOLOGY REPORT: NORMAL

## 2021-07-19 ENCOUNTER — OFFICE VISIT (OUTPATIENT)
Dept: SURGERY | Age: 76
End: 2021-07-19
Payer: MEDICARE

## 2021-07-19 VITALS — RESPIRATION RATE: 18 BRPM | TEMPERATURE: 98.4 F | WEIGHT: 168 LBS | BODY MASS INDEX: 27.99 KG/M2 | HEIGHT: 65 IN

## 2021-07-19 DIAGNOSIS — Z98.890 S/P ENDOSCOPY: Primary | ICD-10-CM

## 2021-07-19 DIAGNOSIS — K26.9 DUODENAL ULCER: ICD-10-CM

## 2021-07-19 DIAGNOSIS — K29.30 CHRONIC SUPERFICIAL GASTRITIS WITHOUT BLEEDING: ICD-10-CM

## 2021-07-19 DIAGNOSIS — K44.9 HIATAL HERNIA: ICD-10-CM

## 2021-07-19 PROCEDURE — 99212 OFFICE O/P EST SF 10 MIN: CPT | Performed by: SURGERY

## 2021-07-19 ASSESSMENT — ENCOUNTER SYMPTOMS
CHOKING: 0
VOMITING: 0
COUGH: 0
SORE THROAT: 0
NAUSEA: 0
SHORTNESS OF BREATH: 0
TROUBLE SWALLOWING: 0
BACK PAIN: 0
ABDOMINAL PAIN: 0
BLOOD IN STOOL: 0

## 2021-07-19 NOTE — PATIENT INSTRUCTIONS
Patient Education        Gastritis: Care Instructions  Your Care Instructions     Gastritis is a sore and upset stomach. It happens when something irritates the stomach lining. Many things can cause it. These include an infection such as the flu or something you ate or drank. Medicines or a sore on the lining of the stomach (ulcer) also can cause it. Your belly may bloat and ache. You may belch, vomit, and feel sick to your stomach. You should be able to relieve the problem by taking medicine. And it may help to change your diet. If gastritis lasts, your doctor may prescribe medicine. Follow-up care is a key part of your treatment and safety. Be sure to make and go to all appointments, and call your doctor if you are having problems. It's also a good idea to know your test results and keep a list of the medicines you take. How can you care for yourself at home? · If your doctor prescribed antibiotics, take them as directed. Do not stop taking them just because you feel better. You need to take the full course of antibiotics. · Be safe with medicines. If your doctor prescribed medicine to decrease stomach acid, take it as directed. Call your doctor if you think you are having a problem with your medicine. · Do not take any other medicine, including over-the-counter pain relievers, without talking to your doctor first.  · If your doctor recommends over-the-counter medicine to reduce stomach acid, such as Pepcid AC (famotidine), Prilosec (omeprazole), or Tagamet HB (cimetidine) follow the directions on the label. · Drink plenty of fluids to prevent dehydration. Choose water and other caffeine-free clear liquids. If you have kidney, heart, or liver disease and have to limit fluids, talk with your doctor before you increase the amount of fluids you drink. · Limit how much alcohol you drink. · Avoid coffee, tea, cola drinks, chocolate, and other foods with caffeine. They increase stomach acid.   When should you call for help? Call 911 anytime you think you may need emergency care. For example, call if:    · You vomit blood or what looks like coffee grounds.     · You pass maroon or very bloody stools. Call your doctor now or seek immediate medical care if:    · You start breathing fast and have not produced urine in the last 8 hours.     · You cannot keep fluids down. Watch closely for changes in your health, and be sure to contact your doctor if:    · You do not get better as expected. Where can you learn more? Go to https://[employer]. org and sign in to your [employer] account. Enter [id number] in the Exp[employer] box to learn more about \"Gastritis: Care Instructions. \"     If you do not have an account, please click on the \"Sign Up Now\" link. Current as of: February 10, 2021               Content Version: 12.9  © 2034-4064 [employer], [employer]. Care instructions adapted under license by [facility] ([facility]). If you have questions about a medical condition or this instruction, always ask your healthcare professional. [place] [address] any warranty or liability for your use of this information.

## 2021-07-19 NOTE — PROGRESS NOTES
Nikolay Valverde MD  General Surgery, Endoscopy  Chief Medical Officer    [facility]  [address] [address]  Dept: [phone]  Fax: [phone]    CHIEF COMPLAINT  No chief complaint on file. HPI    Ms Gregorio Hurtado turns for follow-up after EGD. DATE OF PROCEDURE:  06/04/2021     ATTENDING SURGEON:  Pina Lowry MD     PCP:  Cinthia Kessler MD     PREOPERATIVE DIAGNOSES:  1.  GI bleed. 2.  Anemia.     POSTOPERATIVE DIAGNOSES:  1. Hiatal hernia with widely patent Schatzki's ring. 2.  Antral gastritis with superficial ulcerations. 3.  Large duodenal ulcer with white clot (D1).     OPERATION:  1. Esophagogastroduodenoscopy. 2.  Prepyloric antral biopsies    She is doing well. No new complaints. Bowel movements have mostly returned to normal, no further rectal bleeding. CBC June 5, 2021 with H/H 8.7/26. Hgb 11-12 per patient via outside lab. Review of Systems   Constitutional: Negative for activity change, appetite change, chills, fever and unexpected weight change. HENT: Negative for nosebleeds, sneezing, sore throat and trouble swallowing. Eyes: Negative for visual disturbance. Respiratory: Negative for cough, choking and shortness of breath. Cardiovascular: Negative for chest pain, palpitations and leg swelling. Gastrointestinal: Negative for abdominal pain, blood in stool, nausea and vomiting. Genitourinary: Negative for dysuria, flank pain and hematuria. Musculoskeletal: Positive for arthralgias. Negative for back pain, gait problem and myalgias. Allergic/Immunologic: Negative for immunocompromised state. Neurological: Positive for weakness (left foot drop). Negative for dizziness, seizures, syncope and headaches. Hematological: Does not bruise/bleed easily. Psychiatric/Behavioral: Negative for confusion and sleep disturbance.        Past Medical History:   Diagnosis Date    Dropfoot     left foot    Foot drop     left  Hypertension        Past Surgical History:   Procedure Laterality Date    ANKLE SURGERY      right    APPENDECTOMY      CATARACT REMOVAL      FRACTURE SURGERY      Pt has a plate and 8 screws in rt ankle    HIP FRACTURE SURGERY Right 10/12/2018    Intramedullary Dary placement [employer]    HYSTERECTOMY      GA OFFICE/OUTPT VISIT,PROCEDURE ONLY Right 10/12/2018    RIGHT HIP INTRAMEDULLAR DARY - [employer] CM Nail performed by Gisela Rapp DO at [address] N/A 6/4/2021    EGD BIOPSY performed by Jose Williamson MD at [facility] 2002       No family history on file. Allergies:  See list    Current Outpatient Medications   Medication Sig Dispense Refill    ferrous sulfate (IRON 325) 325 (65 Fe) MG tablet Take 1 tablet by mouth 2 times daily 60 tablet 1    traMADol (ULTRAM) 50 MG tablet Take 50 mg by mouth as needed.  enalapril (VASOTEC) 20 MG tablet Take 40 mg by mouth daily      atorvastatin (LIPITOR) 80 MG tablet Take 80 mg by mouth daily      pantoprazole (PROTONIX) 40 MG tablet Take 1 tablet by mouth daily 30 tablet 3    sucralfate (CARAFATE) 1 GM/10ML suspension Take 10 mLs by mouth 4 times daily May substitute 1 g Carafate tablets if pharmacy staff instruct patient how to create slurry at home. 420 mL 1    acetaminophen (TYLENOL) 325 MG tablet Take 2 tablets by mouth every 4 hours as needed for Pain or Fever 120 tablet 3    potassium chloride (KLOR-CON) 10 MEQ extended release tablet Take 10 mEq by mouth daily       No current facility-administered medications for this visit.        Social History     Socioeconomic History    Marital status:      Spouse name: Not on file    Number of children: Not on file    Years of education: Not on file    Highest education level: Not on file   Occupational History    Not on file   Tobacco Use    Smoking status: Never Smoker    Smokeless tobacco: Never Used Vaping Use    Vaping Use: Never used   Substance and Sexual Activity    Alcohol use: No    Drug use: No    Sexual activity: Not on file   Other Topics Concern    Not on file   Social History Narrative    Not on file     Social Determinants of Health     Financial Resource Strain:     Difficulty of Paying Living Expenses:    Food Insecurity:     Worried About Running Out of Food in the Last Year:     [address] in the Last Year:    Transportation Needs:     Lack of Transportation (Medical):  Lack of Transportation (Non-Medical):    Physical Activity:     Days of Exercise per Week:     Minutes of Exercise per Session:    Stress:     Feeling of Stress :    Social Connections:     Frequency of Communication with Friends and Family:     Frequency of Social Gatherings with Friends and Family:     Attends [employer] Services:     Active Member of Clubs or Organizations:     Attends Club or Organization Meetings:     Marital Status:    Intimate Partner Violence:     Fear of Current or Ex-Partner:     Emotionally Abused:     Physically Abused:     Sexually Abused: There were no vitals taken for this visit. Physical Exam  Vitals and nursing note reviewed. Constitutional:       General: She is not in acute distress. Appearance: She is well-developed. HENT:      Head: Normocephalic and atraumatic. Eyes:      General: No scleral icterus. Conjunctiva/sclera: Conjunctivae normal.      Pupils: Pupils are equal, round, and reactive to light. Neck:      Trachea: No tracheal deviation. Cardiovascular:      Rate and Rhythm: Normal rate. Pulmonary:      Effort: Pulmonary effort is normal. No respiratory distress. Skin:     General: Skin is warm and dry. Neurological:      Mental Status: She is alert and oriented to person, place, and time. Psychiatric:         Behavior: Behavior normal.         Thought Content:  Thought content normal.         Judgment: Judgment normal.         IMAGING/LABS    6/8/2021  8:21 AM - Gian, [facility] Incoming Lab Results From [facility]    Component Collected Lab   Surgical Pathology Report 06/04/2021 12:28  Srinivasa    -- Diagnosis --   ANTRUM BIOPSIES:  MILD CHRONIC INACTIVE GASTRITIS. CHEMA Hubbard   **Electronically Signed Out**         ajneal/6/8/2021         Clinical Information   Pre-op Diagnosis:  GI BLEED   Operative Findings:  STOMACH CLOTEST (H. PYLORI DETECTION); ANTRUM   BIOPSY x 3   Operation Performed:  EGD BIOPSY     Source of Specimen   1: ANTRUM BIOPSY x 3     Gross Description   \"AARON GUALLPA, ANTRUM BIOPSY x 3\" Two tan-white tissue fragments each   0.2 x 0.2 x 0.2 cm and are 0.4 x 0.2 x 0.2 cm in aggregate.  Entirely   1cs.  mpb tm       Microscopic Description   Pyloric-antral type gastric mucosa biopsies have very mild chronic   inactive inflammation.  There is no evidence of H. pylori gastritis. SURGICAL PATHOLOGY CONSULTATION         Patient Name: Eugenio Eckert: [id number]   Path Number: [id number]     [address], [facility] [address] [place], [address]   [phone]   Fax: [phone]    Testing Performed By    Chelsie Branham Name Director Address Valid Date Range   [address]  [address] [address] 08/30/17 0801-Present   Lab and Collection    Surgical Pathology - 6/7/2021      ASSESSMENT     Diagnosis Orders   1. S/P endoscopy     2. Duodenal ulcer  CBC Auto Differential   3. Chronic superficial gastritis without bleeding     4. Hiatal hernia     5. BMI 27.0-27.9,adult         PLAN    Ms Bakari Aaron is doing well. EGD and pathology findings of duodenal ulcer, chronic superficial gastritis with ulceration, hiatal hernia reviewed with patient. The duodenal ulcer is the likely source of patient's recent anemia.   This was asymptomatic in that she had no abdominal pain, no reflux symptoms. We will continue proton pump inhibition. Repeated Hgb 11-12 in Dr Fernando Lyles office, per patient. She has discontinued supplemental iron. Continue healthy, balanced diet, avoid ibuprofen and other nonsteroidal anti-inflammatory medications. May now resume Plavix. We will repeat EGD in 3 to 6 months (Sept-Nov, 2021), with colonoscopy at that time if patient desires. Risks, benefits, alternatives thoroughly reviewed and accepted by Ms. Victor, including GI bleeding, perforation, missed lesions, COVID-19 exposure/infection, etc.  She conveys clear understanding and is in agreement.      Prabhakar Chandler MD

## 2021-07-19 NOTE — LETTER
[facility] Surgical Specialist - [address] [address]  Phone: [phone]  Fax: [phone]    Brisa Boo MD    July 19, 2021     Cherelle Quiroga MD  [facility] [address]    Patient: Yokasta Griffiths   MR Number: [id number]   YOB: 1945   Date of Visit: 7/19/2021       Dear Cherelle Quiroga: Thank you for referring Suad Pedroza to me for evaluation/treatment. Below are the relevant portions of my assessment and plan of care. ASSESSMENT     Diagnosis Orders   1. S/P endoscopy     2. Duodenal ulcer  CBC Auto Differential   3. Chronic superficial gastritis without bleeding     4. Hiatal hernia     5. BMI 27.0-27.9,adult         PLAN    Ms Delmar Cates is doing well. EGD and pathology findings of duodenal ulcer, chronic superficial gastritis with ulceration, hiatal hernia reviewed with patient. The duodenal ulcer is the likely source of patient's recent anemia. This was asymptomatic and that she had no abdominal pain, no reflux symptoms. We will continue proton pump inhibition. Repeat CBC this week. Continue healthy, balanced diet, avoid ibuprofen and other nonsteroidal anti-inflammatory medications. We will repeat EGD in 3 to 6 months (Sept-Oct, 2021), with colonoscopy at that time if patient desires. Risks, benefits, alternatives thoroughly reviewed and accepted by Ms. Victor, including GI bleeding, perforation, missed lesions, COVID-19 exposure/infection, etc.  She conveys clear understanding and is in agreement. If you have questions, please do not hesitate to call me. I look forward to following [facility] along with you.     Sincerely,    MD Brisa Balderrama MD

## 2022-04-29 ENCOUNTER — HOSPITAL ENCOUNTER (OUTPATIENT)
Dept: GENERAL RADIOLOGY | Age: 77
Discharge: HOME | End: 2022-05-01
Payer: MEDICARE

## 2022-04-29 ENCOUNTER — HOSPITAL ENCOUNTER (OUTPATIENT)
Age: 77
Discharge: HOME | End: 2022-04-29
Payer: MEDICARE

## 2022-04-29 ENCOUNTER — HOSPITAL ENCOUNTER (EMERGENCY)
Age: 77
Discharge: HOME | End: 2022-04-29
Attending: FAMILY MEDICINE
Payer: MEDICARE

## 2022-04-29 ENCOUNTER — HOSPITAL ENCOUNTER (OUTPATIENT)
Age: 77
Discharge: HOME | End: 2022-05-01
Payer: MEDICARE

## 2022-04-29 VITALS
HEART RATE: 85 BPM | DIASTOLIC BLOOD PRESSURE: 108 MMHG | SYSTOLIC BLOOD PRESSURE: 189 MMHG | TEMPERATURE: 98.1 F | BODY MASS INDEX: 28.32 KG/M2 | OXYGEN SATURATION: 96 % | RESPIRATION RATE: 13 BRPM | WEIGHT: 170 LBS | HEIGHT: 65 IN

## 2022-04-29 DIAGNOSIS — I10 ESSENTIAL HYPERTENSION: ICD-10-CM

## 2022-04-29 DIAGNOSIS — N30.01 ACUTE CYSTITIS WITH HEMATURIA: ICD-10-CM

## 2022-04-29 DIAGNOSIS — R79.89 ELEVATED BRAIN NATRIURETIC PEPTIDE (BNP) LEVEL: ICD-10-CM

## 2022-04-29 DIAGNOSIS — I48.91 NEW ONSET ATRIAL FIBRILLATION (HCC): Primary | ICD-10-CM

## 2022-04-29 DIAGNOSIS — R63.8 SYMPTOMS OF DEHYDRATION: ICD-10-CM

## 2022-04-29 DIAGNOSIS — R06.02 SHORTNESS OF BREATH: ICD-10-CM

## 2022-04-29 DIAGNOSIS — R60.0 BILATERAL LOWER EXTREMITY EDEMA: ICD-10-CM

## 2022-04-29 LAB
-: ABNORMAL
ABSOLUTE EOS #: 0.1 K/UL (ref 0–0.4)
ABSOLUTE LYMPH #: 1.9 K/UL (ref 1–4.8)
ABSOLUTE MONO #: 0.6 K/UL (ref 0–1)
ANION GAP SERPL CALCULATED.3IONS-SCNC: 13 MMOL/L (ref 9–17)
BACTERIA: ABNORMAL
BASOPHILS # BLD: 1 % (ref 0–2)
BASOPHILS ABSOLUTE: 0.1 K/UL (ref 0–0.2)
BILIRUBIN URINE: NEGATIVE
BUN BLDV-MCNC: 14 MG/DL (ref 8–23)
BUN/CREAT BLD: 17 (ref 9–20)
CALCIUM SERPL-MCNC: 9.2 MG/DL (ref 8.6–10.4)
CHLORIDE BLD-SCNC: 106 MMOL/L (ref 98–107)
CO2: 24 MMOL/L (ref 20–31)
COLOR: YELLOW
COMMENT UA: ABNORMAL
CREAT SERPL-MCNC: 0.82 MG/DL (ref 0.5–0.9)
DIFFERENTIAL TYPE: YES
EOSINOPHILS RELATIVE PERCENT: 2 % (ref 0–5)
EPITHELIAL CELLS UA: ABNORMAL /HPF
GFR AFRICAN AMERICAN: >60 ML/MIN
GFR NON-AFRICAN AMERICAN: >60 ML/MIN
GFR SERPL CREATININE-BSD FRML MDRD: NORMAL ML/MIN/{1.73_M2}
GLUCOSE BLD-MCNC: 90 MG/DL (ref 70–99)
GLUCOSE URINE: NEGATIVE
HCT VFR BLD CALC: 40.8 % (ref 36–46)
HEMOGLOBIN: 12.9 G/DL (ref 12–16)
KETONES, URINE: ABNORMAL
LEUKOCYTE ESTERASE, URINE: ABNORMAL
LYMPHOCYTES # BLD: 23 % (ref 15–40)
MCH RBC QN AUTO: 26.6 PG (ref 26–34)
MCHC RBC AUTO-ENTMCNC: 31.6 G/DL (ref 31–37)
MCV RBC AUTO: 84.4 FL (ref 80–100)
MONOCYTES # BLD: 8 % (ref 4–8)
NITRITE, URINE: NEGATIVE
PDW BLD-RTO: 15.3 % (ref 12.1–15.2)
PH UA: 5 (ref 5–8)
PLATELET # BLD: 290 K/UL (ref 140–450)
POTASSIUM SERPL-SCNC: 3.8 MMOL/L (ref 3.7–5.3)
PRO-BNP: 2506 PG/ML
PROTEIN UA: ABNORMAL
RBC # BLD: 4.83 M/UL (ref 4–5.2)
RBC UA: ABNORMAL /HPF (ref 0–2)
SEG NEUTROPHILS: 66 % (ref 47–75)
SEGMENTED NEUTROPHILS ABSOLUTE COUNT: 5.4 K/UL (ref 2.5–7)
SODIUM BLD-SCNC: 143 MMOL/L (ref 135–144)
SPECIFIC GRAVITY UA: 1.02 (ref 1–1.03)
TROPONIN, HIGH SENSITIVITY: 20 NG/L (ref 0–14)
TROPONIN, HIGH SENSITIVITY: 21 NG/L (ref 0–14)
TURBIDITY: ABNORMAL
URINE HGB: ABNORMAL
UROBILINOGEN, URINE: NORMAL
WBC # BLD: 8 K/UL (ref 3.5–11)
WBC UA: ABNORMAL /HPF

## 2022-04-29 PROCEDURE — 99284 EMERGENCY DEPT VISIT MOD MDM: CPT

## 2022-04-29 PROCEDURE — 80048 BASIC METABOLIC PNL TOTAL CA: CPT

## 2022-04-29 PROCEDURE — 83880 ASSAY OF NATRIURETIC PEPTIDE: CPT

## 2022-04-29 PROCEDURE — 93005 ELECTROCARDIOGRAM TRACING: CPT | Performed by: FAMILY MEDICINE

## 2022-04-29 PROCEDURE — 84484 ASSAY OF TROPONIN QUANT: CPT

## 2022-04-29 PROCEDURE — 87086 URINE CULTURE/COLONY COUNT: CPT

## 2022-04-29 PROCEDURE — 36415 COLL VENOUS BLD VENIPUNCTURE: CPT

## 2022-04-29 PROCEDURE — 85025 COMPLETE CBC W/AUTO DIFF WBC: CPT

## 2022-04-29 PROCEDURE — 71046 X-RAY EXAM CHEST 2 VIEWS: CPT

## 2022-04-29 PROCEDURE — 81001 URINALYSIS AUTO W/SCOPE: CPT

## 2022-04-29 PROCEDURE — 6370000000 HC RX 637 (ALT 250 FOR IP): Performed by: FAMILY MEDICINE

## 2022-04-29 RX ORDER — SULFAMETHOXAZOLE AND TRIMETHOPRIM 800; 160 MG/1; MG/1
1 TABLET ORAL 2 TIMES DAILY
Qty: 14 TABLET | Refills: 0 | Status: SHIPPED | OUTPATIENT
Start: 2022-04-29 | End: 2022-05-06

## 2022-04-29 RX ORDER — METOPROLOL SUCCINATE 25 MG/1
12.5 TABLET, EXTENDED RELEASE ORAL 2 TIMES DAILY
Qty: 30 TABLET | Refills: 1 | Status: SHIPPED | OUTPATIENT
Start: 2022-04-29 | End: 2022-06-27 | Stop reason: SDUPTHER

## 2022-04-29 RX ORDER — CLOPIDOGREL BISULFATE 75 MG/1
75 TABLET ORAL DAILY
COMMUNITY
End: 2022-04-29

## 2022-04-29 RX ORDER — CLONIDINE HYDROCHLORIDE 0.1 MG/1
0.1 TABLET ORAL ONCE
Status: COMPLETED | OUTPATIENT
Start: 2022-04-29 | End: 2022-04-29

## 2022-04-29 RX ADMIN — CLONIDINE HYDROCHLORIDE 0.1 MG: 0.1 TABLET ORAL at 16:30

## 2022-04-29 ASSESSMENT — PAIN - FUNCTIONAL ASSESSMENT: PAIN_FUNCTIONAL_ASSESSMENT: 0-10

## 2022-04-29 ASSESSMENT — PAIN DESCRIPTION - PAIN TYPE: TYPE: CHRONIC PAIN

## 2022-04-29 ASSESSMENT — PAIN DESCRIPTION - FREQUENCY: FREQUENCY: CONTINUOUS

## 2022-04-29 ASSESSMENT — ENCOUNTER SYMPTOMS: SHORTNESS OF BREATH: 1

## 2022-04-29 ASSESSMENT — PAIN SCALES - GENERAL: PAINLEVEL_OUTOF10: 10

## 2022-04-29 ASSESSMENT — PAIN DESCRIPTION - LOCATION: LOCATION: BACK

## 2022-04-29 NOTE — ED PROVIDER NOTES
[address]  eMERGENCY dEPARTMENT eNCOUnter          [facility]       Chief Complaint   Patient presents with    Atrial Fibrillation     Pt was sent down from RT, she was in hospital getting a EKG and chest xray, EKG showed new onset of afib, pt c/o SOB for about 3 weeks. Nurses Notes reviewed and I agree except as noted in the HPI. HISTORY OF PRESENT ILLNESS    Jourdan Barros is a 64 y.o. female who presents to the emergency room via wheelchair, patient had seen in her PCP office by the PA that facility, patient complaining of shortness of breath for the past 3 weeks, there was concern on exam the patient was having some irregular heartbeats, was sent to the hospital for outpatient EKG and chest x-ray. EKG appeared to show new onset atrial fibrillation, and patient's PCP office was contacted who advised her to go to the ER at this facility versus her risking trying to drive her self up to [facility]. Patient was brought to the emergency room for further evaluation. Patient states she has been having some feelings of her heart skipping around intermittently for the past several days, denies any gross chest pain, does feel short of breath especially with some ambulation. Patient denies any known history of atrial fibrillation, denies any new or changed medications. Patient states her pain is 10 of 10 but states it is from her chronic back pain. RT patient advised patient outpatient EKG and chest x-ray, was found to be in atrial fibrillation which is new onset, did contact the patient's PCP office, who stated patient had been there for some time, and advised patient go to the ER at that facility for further evaluation versus trying to drive up to [place] to be seen at [facility]. PCP: Lizette Méndez    REVIEW OF SYSTEMS     Review of Systems   Respiratory: Positive for shortness of breath. All other systems reviewed and are negative.          PAST MEDICAL HISTORY    has a past medical history of Dropfoot, Foot drop, and Hypertension. SURGICAL HISTORY      has a past surgical history that includes Appendectomy; Hysterectomy; Cataract removal; Ankle surgery; fracture surgery; Wrist fracture surgery (Left, ); Hip fracture surgery (Right, 10/12/2018); pr office/outpt visit,procedure only (Right, 10/12/2018); and Upper gastrointestinal endoscopy (N/A, 2021). CURRENT MEDICATIONS       Discharge Medication List as of 2022  4:30 PM      CONTINUE these medications which have NOT CHANGED    Details   clopidogrel (PLAVIX) 75 MG tablet Take 75 mg by mouth dailyHistorical Med      ferrous sulfate (IRON 325) 325 (65 Fe) MG tablet Take 1 tablet by mouth 2 times daily, Disp-60 tablet, R-1Normal      traMADol (ULTRAM) 50 MG tablet Take 50 mg by mouth as needed. Historical Med      enalapril (VASOTEC) 20 MG tablet Take 40 mg by mouth dailyHistorical Med      atorvastatin (LIPITOR) 80 MG tablet Take 80 mg by mouth dailyHistorical Med      pantoprazole (PROTONIX) 40 MG tablet Take 1 tablet by mouth daily, Disp-30 tablet, R-3Normal      sucralfate (CARAFATE) 1 GM/10ML suspension Take 10 mLs by mouth 4 times daily May substitute 1 g Carafate tablets if pharmacy staff instruct patient how to create slurry at home., Disp-420 mL, R-1Normal      acetaminophen (TYLENOL) 325 MG tablet Take 2 tablets by mouth every 4 hours as needed for Pain or Fever, Disp-120 tablet, R-3NO PRINT      potassium chloride (KLOR-CON) 10 MEQ extended release tablet Take 10 mEq by mouth dailyHistorical Med             ALLERGIES     is allergic to penicillin g, penicillins, vicodin [hydrocodone-acetaminophen], alendronate, and hydrocodone. FAMILY HISTORY     She indicated that her mother is . She indicated that her father is . family history is not on file. SOCIAL HISTORY      reports that she has never smoked.  She has never used smokeless tobacco. She reports that she does not drink alcohol and does not use drugs. PHYSICAL EXAM     INITIAL VITALS:  height is 5' 5\" (1.651 m) and weight is 170 lb (77.1 kg). Her oral temperature is 98.1 °F (36.7 °C). Her blood pressure is 189/108 (abnormal) and her pulse is 85. Her respiration is 13 and oxygen saturation is 96%. Physical Exam   Constitutional: Patient is oriented to person, place, and time. Patient appears well-developed and well-nourished. Patient is active and cooperative. HENT:   Head: Normocephalic and atraumatic. Head is without contusion. Right Ear: Hearing and external ear normal. No drainage. Left Ear: Hearing and external ear normal. No drainage. Nose: Nose normal. No nasal deformity. No epistaxis. Mouth/Throat: Mucous membranes are not dry. Eyes: EOMI. Conjunctivae, sclera, and lids are normal. Right eye exhibits no discharge. Left eye exhibits no discharge. Neck: Full passive range of motion without pain and phonation normal.  Negative JVD, negative tracheal deviation  Cardiovascular:  Normal rate, irregular rhythm and intact distal pulses. Noted bilateral lower extremity edema. Pulses: Right radial pulse  2+   Pulmonary/Chest: Effort normal. No tachypnea and no bradypnea. No wheezes, rhonchi, or rales. Abdominal: Soft. Patient without distension or tenderness  Musculoskeletal:   Negative acute trauma or deformity,  apparent full range of motion and normal strength all extremities appropriate to age. Neurological: Patient is alert and oriented to person, place, and time. patient displays no tremor. Patient displays no seizure activity. .  Skin: Skin is warm and dry. Patient is not diaphoretic. Psychiatric: Patient has a normal mood and pleasant though slight anxious affect.  Patient speech is normal and behavior is normal. Cognition and memory are normal.    DIFFERENTIAL DIAGNOSIS:   Dysrhythmia NOS, pneumonia effusion electrolyte abnormality UTI    DIAGNOSTIC RESULTS     EKG: All EKG's are interpreted by the Emergency Department Physician who either signs or Co-signs this chart in the absence of a cardiologist.  EKG    The patient had an EKG which is interpreted by me in the absence of a Cardiologist.   [] Without comparison to previous.    [x] With comparison to a previous EKG Dated 6/4/2021, 5/18/2021, 10/11/2018    EKG @ 1319 hrs -atrial fibrillation/atrial flutter with PVCs, rate 96, QRS QTC normal, normal axis, prior EKGs do not show any atrial fibs/flutter, noted PVCs    EKG @ 1433 hrs-atrial fibrillation/flutter, rate 79, QRS QTC normal, normal axis      RADIOLOGY: non-plain film images(s) such as CT, Ultrasound and MRI are read by the radiologist.  No orders to display       LABS:   Labs Reviewed   CBC WITH AUTO DIFFERENTIAL - Abnormal; Notable for the following components:       Result Value    RDW 15.3 (*)     All other components within normal limits   TROPONIN - Abnormal; Notable for the following components:    Troponin, High Sensitivity 21 (*)     All other components within normal limits   BRAIN NATRIURETIC PEPTIDE - Abnormal; Notable for the following components:    Pro-BNP 2,506 (*)     All other components within normal limits   URINALYSIS WITH MICROSCOPIC - Abnormal; Notable for the following components:    Turbidity UA Hazy (*)     Ketones, Urine TRACE (*)     Urine Hgb TRACE (*)     Protein, UA 1+ (*)     Leukocyte Esterase, Urine 3+ (*)     Bacteria, UA 1+ (*)     All other components within normal limits   TROPONIN - Abnormal; Notable for the following components:    Troponin, High Sensitivity 20 (*)     All other components within normal limits   CULTURE, URINE   BASIC METABOLIC PANEL W/ REFLEX TO MG FOR LOW K       EMERGENCY DEPARTMENT COURSE:   Vitals:    Vitals:    04/29/22 1446 04/29/22 1515 04/29/22 1630 04/29/22 1643   BP: (!) 171/93 (!) 180/128 (!) 196/126 (!) 189/108   Pulse: 81 85     Resp: 13 13     Temp:       TempSrc:       SpO2:  96%     Weight:       Height:         Patient history and physical exam taken at bedside, discussed patient symptoms and exam findings, discussed initial work-up to include IV access and blood work, will review patient's outpatient EKG and chest x-ray, and reevaluate. Patient placed on cardiac monitor, continuous pulse ox, sitting high semi-Montoya's in bed. Outpatient EKG reviewed, outpatient chest x-ray report reviewed    Lab work-up reviewed, noting hsTnT 21, pBNP 2506, normal kidney function electrolytes, normal white blood cell count differential    Urinalysis reviewed, noting 3+ leukocytes negative nitrite on clean-catch urine    Discussed with patient her initial work-up, with a to get a 1 hour troponin to reevaluate, acknowledged    hsTnT 20    I was able to contact Dr. Denzel Macias, cardiology, regarding patient's presentation, work-up, agrees to starting patient on metoprolol 12.5 mg twice daily, Eliquis twice daily, outpatient follow-up with echocardiogram next visit stay and will see in office    Due to patient's continued elevated blood pressure, will go ahead and give clonidine 0.1 mg p.o. x1, noting patient will likely be on her metoprolol later this evening    Discussed with patient her overall work-up, discussed  New onset atrial fibrillation, need for being on rate lowering medication and blood thinner, outpatient echocardiogram and following up with cardiology, will provide prescription and a recommended fracture 30-day Eliquis card, discussed patient abnormalities of urine, given patient's age we will go ahead and treat for presumptive UTI will awaiting culture results, confirmed allergies, initiate patient on Bactrim DS. We discussed being on blood thinners, expectations increased bleeding if she were to get cut, importance of returning to immediately to the nearest ER if she has falls or hits her head.   We discussed outpatient follow-up, return to ER if any symptoms change worsen or concerns, acknowledged    Patient pharmacy texted patient do advise they do not have any Eliquis, patient prescription was rerouted to [facility] in [facility]. Patient initially did go to [facility] to get her other prescriptions filled, patient had told me during ER stay that she was no longer taking Plavix, however had mistakenly not taken this off of her med list, and they did try to give her Plavix instead, patient is advised to stop the Plavix and only take Eliquis at this time. Patient's medication list revised at this time. FINAL IMPRESSION      1. New onset atrial fibrillation (HCC)    2. Elevated brain natriuretic peptide (BNP) level    3. Acute cystitis with hematuria    4. Symptoms of dehydration    5. Bilateral lower extremity edema    6.  Essential hypertension          DISPOSITION/PLAN   Discharge    PATIENT REFERRED TO:  Kristian Martin MD  [place] [address] [phone]    Call   Call Monday first thing Monday morning for same day appt    Evelia Sharma MD  [facility] [phone]    Call   As needed    [facility] ED  [address] [facility] [address] [phone]    As needed, If symptoms worsen      DISCHARGE MEDICATIONS:  Discharge Medication List as of 4/29/2022  4:30 PM      START taking these medications    Details   metoprolol succinate (TOPROL XL) 25 MG extended release tablet Take 0.5 tablets by mouth in the morning and at bedtime, Disp-30 tablet, R-1Normal      sulfamethoxazole-trimethoprim (BACTRIM DS) 800-160 MG per tablet Take 1 tablet by mouth 2 times daily for 7 days, Disp-14 tablet, R-0Normal      apixaban (ELIQUIS) 5 MG TABS tablet Take 1 tablet by mouth 2 times daily, Disp-60 tablet, R-0Normal                 Summation      Patient Course: Discharge    ED Medications administered this visit:    Medications   cloNIDine (CATAPRES) tablet 0.1 mg (0.1 mg Oral Given 4/29/22 1630)       New Prescriptions from this visit:    Discharge Medication List as of 4/29/2022  4:30 PM START taking these medications    Details   metoprolol succinate (TOPROL XL) 25 MG extended release tablet Take 0.5 tablets by mouth in the morning and at bedtime, Disp-30 tablet, R-1Normal      sulfamethoxazole-trimethoprim (BACTRIM DS) 800-160 MG per tablet Take 1 tablet by mouth 2 times daily for 7 days, Disp-14 tablet, R-0Normal      apixaban (ELIQUIS) 5 MG TABS tablet Take 1 tablet by mouth 2 times daily, Disp-60 tablet, R-0Normal             Follow-up:  Jill Sims MD  [address] [place] [address] Mina Mathias [address]16    Call   Call Monday first thing Monday morning for same day appt    Roselyn Montalvo MD  [facility] [phone]    Call   As needed    [facility] ED  [address] [place] [address]  [phone]    As needed, If symptoms worsen        Final Impression:   1. New onset atrial fibrillation (HCC)    2. Elevated brain natriuretic peptide (BNP) level    3. Acute cystitis with hematuria    4. Symptoms of dehydration    5. Bilateral lower extremity edema    6.  Essential hypertension               (Please note that portions of this note were completed with a voice recognition program.  Efforts were made to edit the dictations but occasionally words are mis-transcribed.)    MD Alexandrea Flores MD  04/30/22 4975

## 2022-04-30 LAB
EKG ATRIAL RATE: 117 BPM
EKG ATRIAL RATE: 75 BPM
EKG Q-T INTERVAL: 356 MS
EKG Q-T INTERVAL: 396 MS
EKG QRS DURATION: 124 MS
EKG QRS DURATION: 124 MS
EKG QTC CALCULATION (BAZETT): 449 MS
EKG QTC CALCULATION (BAZETT): 454 MS
EKG R AXIS: 63 DEGREES
EKG R AXIS: 84 DEGREES
EKG T AXIS: 107 DEGREES
EKG T AXIS: 85 DEGREES
EKG VENTRICULAR RATE: 79 BPM
EKG VENTRICULAR RATE: 96 BPM

## 2022-04-30 PROCEDURE — 93010 ELECTROCARDIOGRAM REPORT: CPT | Performed by: INTERNAL MEDICINE

## 2022-05-01 LAB
CULTURE: NORMAL
SPECIMEN DESCRIPTION: NORMAL

## 2022-05-02 ENCOUNTER — HOSPITAL ENCOUNTER (OUTPATIENT)
Age: 77
Setting detail: SPECIMEN
Discharge: HOME | End: 2022-05-02
Payer: MEDICARE

## 2022-05-02 DIAGNOSIS — E78.5 HYPERLIPIDEMIA, UNSPECIFIED HYPERLIPIDEMIA TYPE: ICD-10-CM

## 2022-05-02 DIAGNOSIS — I10 PRIMARY HYPERTENSION: Primary | ICD-10-CM

## 2022-05-02 DIAGNOSIS — R06.02 SOB (SHORTNESS OF BREATH): ICD-10-CM

## 2022-05-02 DIAGNOSIS — E55.9 VITAMIN D DEFICIENCY: ICD-10-CM

## 2022-05-02 DIAGNOSIS — I49.3 PVC (PREMATURE VENTRICULAR CONTRACTION): ICD-10-CM

## 2022-05-02 DIAGNOSIS — I10 PRIMARY HYPERTENSION: ICD-10-CM

## 2022-05-02 LAB
ALBUMIN SERPL-MCNC: 3.9 G/DL (ref 3.5–5.2)
ALP BLD-CCNC: 116 U/L (ref 35–104)
ALT SERPL-CCNC: 22 U/L (ref 5–33)
ANION GAP SERPL CALCULATED.3IONS-SCNC: 16 MMOL/L (ref 9–17)
AST SERPL-CCNC: 31 U/L
BILIRUB SERPL-MCNC: 0.47 MG/DL (ref 0.3–1.2)
BUN BLDV-MCNC: 15 MG/DL (ref 8–23)
BUN/CREAT BLD: 19 (ref 9–20)
CALCIUM SERPL-MCNC: 9.4 MG/DL (ref 8.6–10.4)
CHLORIDE BLD-SCNC: 106 MMOL/L (ref 98–107)
CHOLESTEROL/HDL RATIO: 2.3
CHOLESTEROL: 170 MG/DL
CO2: 21 MMOL/L (ref 20–31)
CREAT SERPL-MCNC: 0.8 MG/DL (ref 0.5–0.9)
GFR AFRICAN AMERICAN: >60 ML/MIN
GFR NON-AFRICAN AMERICAN: >60 ML/MIN
GFR SERPL CREATININE-BSD FRML MDRD: ABNORMAL ML/MIN/{1.73_M2}
GLUCOSE BLD-MCNC: 80 MG/DL (ref 70–99)
HDLC SERPL-MCNC: 74 MG/DL
LDL CHOLESTEROL: 83 MG/DL (ref 0–130)
MAGNESIUM: 1.7 MG/DL (ref 1.6–2.6)
POTASSIUM SERPL-SCNC: 4.2 MMOL/L (ref 3.7–5.3)
SODIUM BLD-SCNC: 143 MMOL/L (ref 135–144)
TOTAL PROTEIN: 7 G/DL (ref 6.4–8.3)
TRIGL SERPL-MCNC: 67 MG/DL
TSH SERPL DL<=0.05 MIU/L-ACNC: 1.05 UIU/ML (ref 0.3–5)
VITAMIN D 25-HYDROXY: 11.1 NG/ML

## 2022-05-02 PROCEDURE — 82306 VITAMIN D 25 HYDROXY: CPT

## 2022-05-02 PROCEDURE — 83735 ASSAY OF MAGNESIUM: CPT

## 2022-05-02 PROCEDURE — 80061 LIPID PANEL: CPT

## 2022-05-02 PROCEDURE — 80053 COMPREHEN METABOLIC PANEL: CPT

## 2022-05-02 PROCEDURE — 84443 ASSAY THYROID STIM HORMONE: CPT

## 2022-05-03 ENCOUNTER — OFFICE VISIT (OUTPATIENT)
Dept: CARDIOLOGY CLINIC | Age: 77
End: 2022-05-03
Payer: MEDICARE

## 2022-05-03 VITALS
BODY MASS INDEX: 28.12 KG/M2 | OXYGEN SATURATION: 96 % | WEIGHT: 169 LBS | SYSTOLIC BLOOD PRESSURE: 170 MMHG | HEART RATE: 84 BPM | DIASTOLIC BLOOD PRESSURE: 100 MMHG

## 2022-05-03 DIAGNOSIS — I48.91 ATRIAL FIBRILLATION, UNSPECIFIED TYPE (HCC): Primary | ICD-10-CM

## 2022-05-03 DIAGNOSIS — R06.02 SOB (SHORTNESS OF BREATH): ICD-10-CM

## 2022-05-03 PROCEDURE — 99204 OFFICE O/P NEW MOD 45 MIN: CPT | Performed by: INTERNAL MEDICINE

## 2022-05-03 NOTE — PROGRESS NOTES
Ov Dr. 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

## 2022-05-03 NOTE — PATIENT INSTRUCTIONS
Start taking Vit D 5000 units daily (OTC)    Will set up for Cardioversion sometime  Between May 22-24    Will set up for Rita Mo and echo(has an order from Tashi Feliciano use that order)

## 2022-05-08 NOTE — PROGRESS NOTES
Alessandra Brady M.D. [address]  [phone]        May 3, 2022        Lyric Prasad MD  [facility] [address]    RE:   Jourdan Barros  :  1945    Dear Dr. Jose Cruz Matamoros:    CHIEF COMPLAINT:  1. Atrial fibrillation discovered on 2022, when she had shortness of breath and went to the emergency room. 2.  On Eliquis 5 mg b.i.d.  3.  Status post TIA on 2021, which may have been from occult paroxysmal atrial fibrillation. HISTORY OF PRESENT ILLNESS:  I had the pleasure of seeing Chapis Avendano in our office on 2022. She is a pleasant 68-year-old female who has never had a cardiac issue in the past and has never seen a previous cardiologist.  She has been treated for hyperlipidemia and hypertension. She was active at home and was fairly unlimited in her activity. Approximately four weeks ago, she began developing shortness of breath. It was worse when walking or attempting any activity. She had occasional chest pain \"like a pulled muscle\". This would occur both at rest and activity but seemed to be more associated with activity. She went to [facility] for shortness of breath. An EKG was done in which she was found to be in atrial fibrillation. She was sent to our emergency room on , for new onset atrial fibrillation. Her rate was in the 70s to 90 range with no acute changes. Enzymes were negative. I was contacted and we placed her on metoprolol 12.5 mg b.i.d. along with Eliquis and I saw her today in consult. She continues to be short of breath with activity, although was able to walk to our office. She has had no syncope or near syncope, lightheadedness or dizziness. She really cannot tell that she has an irregular heartbeat. She does have a history of TIA on 2021.   Her carotid ultrasounds were negative, with less than 50% bilateral carotid artery disease. An echocardiogram done on 05/18/2021, showed normal LV function with EF of 55% with no thrombus identified. Her TIA was manifested by loss of function in her right lower arm, lasting approximately 2 to 5 minutes, which resolved spontaneously. Because it was a cryptogenic TIA, she was placed on Plavix and aspirin. Plavix was stopped in the emergency room when she was placed on Eliquis. She has had no PND or orthopnea and no unusual pedal edema. CARDIAC RISK FACTORS:  TIA:  Positive. Hypertension:  Positive. Hyperlipidemia:  Positive, with an untreated LDL in the 160 to 170 range. Diabetes:  Negative. Smoking:  Negative. Other Family Members:  Positive. MEDICATIONS AT THIS TIME:  She is on Eliquis 5 mg b.i.d., Lipitor 80 mg daily, Vasotec 20 mg two tablets daily, Toprol-XL 25 mg half a tablet b.i.d., Protonix 40 mg daily, potassium 10 mEq daily, Bactrim DS b.i.d. for 7 days, Ultram 50 mg as needed. PAST MEDICAL AND SURGICAL HISTORY:  1. She has a left footdrop. 2.  History of hypertension and hyperlipidemia. 3.  She has a history of GERD. 4.  She had a TIA on 05/18/2021.  5.  She had an appendectomy. 6.  Bilateral extraction of cataracts. 7.  Closed fracture of the right femur. 8.  Total abdominal hysterectomy. 9.  Tubal ligation with left ovary remaining. FAMILY HISTORY:  Significant for coronary artery disease. SOCIAL HISTORY:  She is 64years old. Does not smoke, does not drink alcohol. She is . Has one daughter by the name, Gee Andres, who lives next door. 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 REVIEW OF SYSTEMS:  Cardiac as above. Other systems reviewed including constitutional, eyes, ears, nose and throat, cardiovascular, respiratory, GI, , musculoskeletal, integumentary, neurologic, endocrine, hematologic and allergic/immunologic are negative except for what is described above. No weight loss or weight gain.   No change in bowel habits. No blood in stools. No fevers, sweats or chills. PHYSICAL EXAMINATION:  VITAL SIGNS:  Her blood pressure was 170/100 with a heart rate of 84 and irregular. Respiratory rate 18. O2 sat 96%. Weight 169 pounds. GENERAL:  She is a pleasant 67-year-old female. Denied pain. She was oriented to person and time. Answered questions appropriately. SKIN:  No unusual skin changes. HEENT:  The pupils are equally round and intact. Mucous membranes were dry. NECK:  No JVD. Good carotid pulses. No carotid bruits. No lymphadenopathy or thyromegaly. CARDIOVASCULAR EXAM:  S1 and S2 were normal.  No S3 or S4. She was irregularly irregular. She had a systolic ejection murmur. No diastolic murmur. PMI was normal.  No lift, thrust, or pericardial friction rub. LUNGS:  Clear to auscultation and percussion. ABDOMEN:  Soft and nontender. Good bowel sounds. EXTREMITIES:  Good femoral pulses. Good pedal pulses. No pedal edema. Skin was warm and dry. No calf tenderness. Nail beds pink. Good cap refill. PULSES:  Bilateral symmetrical radial, brachial and carotid pulses. No carotid bruits. Good femoral and pedal pulses. NEUROLOGIC EXAM:  Within normal limits. PSYCHIATRIC EXAM:  Within normal limits. LABORATORY DATA:  Her sodium was 143, potassium 4.2, BUN 15, creatinine 0.80, GFR greater than 60, calcium 9.4. Her cholesterol was 170 with an HDL of 74, LDL 83, triglycerides 67. ALT was 22, AST was 31. TSH 1.05. Vitamin D was 11.1. White count 8.0, hemoglobin 12.9 with a platelet count of 429,687. Her EKG showed atrial fibrillation with a controlled ventricular response with nonspecific ST changes laterally. IMPRESSION:  1. Atrial fibrillation, new onset, discovered when she went to the emergency room on 04/29/2022.  2.  Shortness of breath for four weeks, possibly secondary to atrial fibrillation. 3.  Chest pain, which may be an anginal equivalent.    4.  TIA on 05/18/2021, with no etiology found and her on Plavix since that time, although consider occult paroxysmal atrial fibrillation as an etiology. 5.  Hypertension. 6.  Severe hyperlipidemia, well controlled on Lipitor. 7.  Vitamin D deficiency. PLAN:  1. Start 5000 units of vitamin D daily. 2.  We will do a cardioversion between 05/22 and 05/24, as she would have been on Eliquis for three weeks. 3.  We will do a Lexiscan stress test.  4.  An echocardiogram has been ordered by Dr. Key Calix already. 5.  I will meet with her after her cardioversion and we will call her with the results of the Conrado Pean and echo. DISCUSSION:  Ms. Carmenza Ortiz has been short of breath for the last four weeks. When she saw Danette Gunter, she was noted to be in atrial fibrillation and went to our emergency room. Her enzymes were negative. We did place her on Lopressor 25 mg a half a tablet b.i.d. for rate control and it seems to have done a good job. She is short of breath with activity, which may be due to her atrial fibrillation. Therefore, we will plan on a cardioversion after she has been anticoagulated for three weeks. However, with her risk factors of hypertension and hyperlipidemia, I am concerned about occult coronary artery disease and we will do a Lexiscan Cardiolite stress test.  An echocardiogram has already been ordered by Dr. Radha Salas. If her stress test is unremarkable, then we will follow her and see if her symptoms resolve after she has converted to sinus rhythm. We will also be able to evaluate her chest pain better after she is in sinus rhythm. I think there is a fairly high possibility of her having significant coronary artery disease, however. Thank you very much for allowing me the privilege of seeing Ms. Carmenza Ortiz. If you have any questions on my thoughts, please do not hesitate to contact me.     Sincerely,        Krystyna Posadas    D: 05/04/2022 7:49:12     T: 05/04/2022 7:54:22     [email or web address]  Job#: [id number] Doc#: [id number]

## 2022-05-18 ENCOUNTER — HOSPITAL ENCOUNTER (OUTPATIENT)
Dept: NUCLEAR MEDICINE | Age: 77
Discharge: HOME | End: 2022-05-20
Payer: MEDICARE

## 2022-05-18 ENCOUNTER — HOSPITAL ENCOUNTER (OUTPATIENT)
Dept: NON INVASIVE DIAGNOSTICS | Age: 77
Discharge: HOME | End: 2022-05-18
Payer: MEDICARE

## 2022-05-18 VITALS — SYSTOLIC BLOOD PRESSURE: 172 MMHG | HEART RATE: 68 BPM | DIASTOLIC BLOOD PRESSURE: 106 MMHG

## 2022-05-18 DIAGNOSIS — R06.02 SOB (SHORTNESS OF BREATH): ICD-10-CM

## 2022-05-18 DIAGNOSIS — I48.91 NEW ONSET ATRIAL FIBRILLATION (HCC): ICD-10-CM

## 2022-05-18 DIAGNOSIS — I48.91 ATRIAL FIBRILLATION, UNSPECIFIED TYPE (HCC): ICD-10-CM

## 2022-05-18 LAB
LV EF: 60 %
LVEF MODALITY: NORMAL

## 2022-05-18 PROCEDURE — 78452 HT MUSCLE IMAGE SPECT MULT: CPT

## 2022-05-18 PROCEDURE — A9500 TC99M SESTAMIBI: HCPCS | Performed by: INTERNAL MEDICINE

## 2022-05-18 PROCEDURE — 6360000002 HC RX W HCPCS: Performed by: INTERNAL MEDICINE

## 2022-05-18 PROCEDURE — 2580000003 HC RX 258: Performed by: INTERNAL MEDICINE

## 2022-05-18 PROCEDURE — 93017 CV STRESS TEST TRACING ONLY: CPT

## 2022-05-18 PROCEDURE — 93306 TTE W/DOPPLER COMPLETE: CPT

## 2022-05-18 PROCEDURE — 3430000000 HC RX DIAGNOSTIC RADIOPHARMACEUTICAL: Performed by: INTERNAL MEDICINE

## 2022-05-18 RX ORDER — SODIUM CHLORIDE 0.9 % (FLUSH) 0.9 %
10 SYRINGE (ML) INJECTION PRN
Status: DISCONTINUED | OUTPATIENT
Start: 2022-05-18 | End: 2022-05-18 | Stop reason: HOSPADM

## 2022-05-18 RX ORDER — AMINOPHYLLINE DIHYDRATE 25 MG/ML
50 INJECTION, SOLUTION INTRAVENOUS PRN
Status: DISCONTINUED | OUTPATIENT
Start: 2022-05-18 | End: 2022-05-18 | Stop reason: HOSPADM

## 2022-05-18 RX ADMIN — SODIUM CHLORIDE, PRESERVATIVE FREE 10 ML: 5 INJECTION INTRAVENOUS at 10:29

## 2022-05-18 RX ADMIN — TETRAKIS(2-METHOXYISOBUTYLISOCYANIDE)COPPER(I) TETRAFLUOROBORATE 10 MILLICURIE: 1 INJECTION, POWDER, LYOPHILIZED, FOR SOLUTION INTRAVENOUS at 09:42

## 2022-05-18 RX ADMIN — REGADENOSON 0.4 MG: 0.08 INJECTION, SOLUTION INTRAVENOUS at 10:29

## 2022-05-18 RX ADMIN — TETRAKIS(2-METHOXYISOBUTYLISOCYANIDE)COPPER(I) TETRAFLUOROBORATE 30 MILLICURIE: 1 INJECTION, POWDER, LYOPHILIZED, FOR SOLUTION INTRAVENOUS at 09:42

## 2022-05-19 NOTE — PROCEDURES
[place] [address]                              CARDIAC STRESS TEST    PATIENT NAME: Beverley Ortiz                      :        1945  MED REC NO:   [id number]                              ROOM:  ACCOUNT NO:   [de-identified]                           ADMIT DATE: 2022  PROVIDER:     Della Gibson MD      DATE OF STUDY:  2022    Cardiovascular Diagnostics Department    Ordering Provider:  Marcell Hammer MD    Primary Care Provider:  Jose Hawkins. Teri Matthews MD    Interpreting Physician:   Radha Casas. Campbell Sharif MD    MYOCARDIAL PERFUSION STRESS IMAGING    The stress ECG results are reported separately. NUCLEAR IMAGING RESULTS:  The overall quality of the study is fair. Mild attenuation artifact was seen. There is no evidence of abnormal  lung uptake. Additionally, the right ventricle appears normal.  The  left ventricular cavity is noted to be normal in size on stress images. There is no evidence of transient ischemic dilatation (TID) of the left  ventricle. Gated SPECT imaging reveals normal myocardial thickening and wall motion  with a calculated left ventricular ejection fraction (EF) of 50%. The rest images demonstrated a small to moderate perfusion abnormality  of mild intensity in the lateral and inferior regions which is most  likely due to artifact. On stress imaging, a small perfusion abnormality of mild intensity was  noted in the lateral region which is most likely due to artifact. IMPRESSION:  1. Equivocal myocardial perfusion study. There is a small perfusion  defect of mild intensity in the lateral region during stress and rest  imaging, which is most consistent with artifact, but may be due to a  small degree of coronary ischemia.     2.  Global left ventricular systolic function was abnormal with an  ejection fraction of 50%, without regional wall motion abnormalities. Overall these results are most consistent with a low to intermediate  risk for significant coronary artery disease. Depending on the patient's symptoms and level of clinical suspicion,  aggressive medical management vs.  additional testing by coronary  angiography may be indicated. The results of this test were discussed with Dr. Marlene Ayala on 5/18/22 at  [id number].         Marilu Howell MD    D: 05/19/2022 9:30:37       T: 05/19/2022 9:40:17     [email or web address]  Job#: [id number]     Doc#: Unknown    CC:  MD Marjan Lamar

## 2022-05-19 NOTE — PROCEDURES
[place] [address]                              CARDIAC STRESS TEST    PATIENT NAME: Sravanthi Otero                      :        1945  MED REC NO:   [id number]                              ROOM:  ACCOUNT NO:   [de-identified]                           ADMIT DATE: 2022  PROVIDER:     Esthela Lobo      DATE OF STUDY:  2022    LEXISCAN CARDIOLITE STRESS TEST:    INDICATION:  Chest pain. IMPRESSION:  1. We gave 0.4 mg of Lexiscan intravenously. 2.  This was followed in 20 seconds by Cardiolite infusion. 3.  There was no chest pain. 4.  There was no ST depression. 5.  It was an overall negative Lexiscan stress test.  6.  Cardiolite to follow.       Harry Parisi    D: 2022 7:02:26       T: 2022 7:27:25     [email or web address]  Job#: [id number]     Doc#: [id number]    CC:  Mark Disla

## 2022-05-23 ENCOUNTER — HOSPITAL ENCOUNTER (OUTPATIENT)
Dept: NON INVASIVE DIAGNOSTICS | Age: 77
Discharge: HOME | End: 2022-05-23

## 2022-05-23 ENCOUNTER — TELEPHONE (OUTPATIENT)
Dept: CARDIOLOGY CLINIC | Age: 77
End: 2022-05-23

## 2022-05-23 NOTE — PRE SEDATION
Sedation Pre-Procedure Note    Patient Name: Michelle Tubbs   YOB: 1945  Room/Bed: Room/bed info not found  Medical Record Number: [id number]  Date: 5/23/2022   Time: 7:35 AM       Indication:  ateial fibrillation    Consent: I have discussed with the patient and/or the patient representative the indication, alternatives, and the possible risks and/or complications of the planned procedure and the anesthesia methods. The patient and/or patient representative appear to understand and agree to proceed. Vital Signs: There were no vitals filed for this visit. Past Medical History:   has a past medical history of Dropfoot, Foot drop, and Hypertension. Past Surgical History:   has a past surgical history that includes Appendectomy; Hysterectomy; Cataract removal; Ankle surgery; fracture surgery; Wrist fracture surgery (Left, 2002); Hip fracture surgery (Right, 10/12/2018); pr office/outpt visit,procedure only (Right, 10/12/2018); and Upper gastrointestinal endoscopy (N/A, 6/4/2021). Medications:   Scheduled Meds:   Continuous Infusions:   PRN Meds:   Home Meds:   Prior to Admission medications    Medication Sig Start Date End Date Taking? Authorizing Provider   metoprolol succinate (TOPROL XL) 25 MG extended release tablet Take 0.5 tablets by mouth in the morning and at bedtime 4/29/22   Mayra Lopez MD   apixaban Jeremías Mince) 5 MG TABS tablet Take 1 tablet by mouth 2 times daily 4/29/22 5/29/22  Mayra Lopez MD   traMADol (ULTRAM) 50 MG tablet Take 50 mg by mouth as needed.  10/1/20   Historical Provider, MD   enalapril (VASOTEC) 20 MG tablet Take 40 mg by mouth daily 8/19/20   Historical Provider, MD   atorvastatin (LIPITOR) 80 MG tablet Take 80 mg by mouth daily 5/27/21   Historical Provider, MD   pantoprazole (PROTONIX) 40 MG tablet Take 1 tablet by mouth daily 6/4/21   Kandace Landa MD   acetaminophen (TYLENOL) 325 MG tablet Take 2 tablets by mouth every 4 hours as needed for Pain or Fever 10/19/18   Wisam Pearson MD   potassium chloride (KLOR-CON) 10 MEQ extended release tablet Take 10 mEq by mouth daily    Historical Provider, MD     Coumadin Use Last 7 Days:  no  Antiplatelet drug therapy use last 7 days: no  Other anticoagulant use last 7 days: yes - Eliquis  Additional Medication Information:        Pre-Sedation Documentation and Exam:   I have personally completed a history, physical exam & review of systems for this patient (see notes).     Mallampati Airway Assessment:  normal    Prior History of Anesthesia Complications:   none    ASA Classification:  Class 2 - A normal healthy patient with mild systemic disease    Sedation/ Anesthesia Plan:   intravenous sedation    Medications Planned:   propofol intravenously    Patient is an appropriate candidate for plan of sedation: yes    Electronically signed by Minerva Matta MD on 5/23/2022 at 7:35 AM

## 2022-05-23 NOTE — TELEPHONE ENCOUNTER
Spoke to Sandra Squires to see if she is okay since she cancelled the Cardioversion. Patient stated that her daughter broke her foot and she did not have transportation. I offered to transfer the call to scheduling and explained about the [facility]. Patient stated that she will call at a later date to reschedule.

## 2022-05-23 NOTE — H&P
West Pearson M.D. [address] [place] [address]  [phone]           May 3, 2022           Dory Hendrickson MD  [facility] [address]  [place] [address]     RE:   [address]  :  1945     Dear Dr. Eura Hammans:  Herminia Lee:  1. Atrial fibrillation discovered on 2022, when she had shortness of breath and went to the emergency room. 2.  On Eliquis 5 mg b.i.d.  3.  Status post TIA on 2021, which may have been from occult paroxysmal atrial fibrillation.     HISTORY OF PRESENT ILLNESS:  I had the pleasure of seeing Edgardo Urena in our office on 2022. She is a pleasant 68-year-old female who has never had a cardiac issue in the past and has never seen a previous cardiologist.  She has been treated for hyperlipidemia and hypertension.     She was active at home and was fairly unlimited in her activity.     Approximately four weeks ago, she began developing shortness of breath. It was worse when walking or attempting any activity. She had occasional chest pain \"like a pulled muscle\". This would occur both at rest and activity but seemed to be more associated with activity.     She went to [facility]ness of breath. An EKG was done in which she was found to be in atrial fibrillation. She was sent to our emergency room on , for new onset atrial fibrillation.     Her rate was in the 70s to 90 range with no acute changes. Enzymes were negative. I was contacted and we placed her on metoprolol 12.5 mg b.i.d. along with Eliquis and I saw her today in consult.     She continues to be short of breath with activity, although was able to walk to our office. She has had no syncope or near syncope, lightheadedness or dizziness. She really cannot tell that she has an irregular heartbeat.     She does have a history of TIA on 2021.   Her carotid ultrasounds were negative, with less than 50% bilateral carotid artery disease. An echocardiogram done on 05/18/2021, showed normal LV function with EF of 55% with no thrombus identified.     Her TIA was manifested by loss of function in her right lower arm, lasting approximately 2 to 5 minutes, which resolved spontaneously. Because it was a cryptogenic TIA, she was placed on Plavix and aspirin.     Plavix was stopped in the emergency room when she was placed on Eliquis.     She has had no PND or orthopnea and no unusual pedal edema.     CARDIAC RISK FACTORS:  TIA:  Positive. Hypertension:  Positive. Hyperlipidemia:  Positive, with an untreated LDL in the 160 to 170 range. Diabetes:  Negative. Smoking:  Negative. Other Family Members:  Positive.     MEDICATIONS AT THIS TIME:  She is on Eliquis 5 mg b.i.d., Lipitor 80 mg daily, Vasotec 20 mg two tablets daily, Toprol-XL 25 mg half a tablet b.i.d., Protonix 40 mg daily, potassium 10 mEq daily, Bactrim DS b.i.d. for 7 days, Ultram 50 mg as needed.     PAST MEDICAL AND SURGICAL HISTORY:  1. She has a left footdrop. 2.  History of hypertension and hyperlipidemia. 3.  She has a history of GERD. 4.  She had a TIA on 05/18/2021.  5.  She had an appendectomy. 6.  Bilateral extraction of cataracts. 7.  Closed fracture of the right femur. 8.  Total abdominal hysterectomy. 9.  Tubal ligation with left ovary remaining.     FAMILY HISTORY:  Significant for coronary artery disease.     SOCIAL HISTORY:  She is 64years old. Does not smoke, does not drink alcohol. She is . Has one daughter by the name, Flor Morin, who lives next door. 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 Other systems reviewed including constitutional, eyes, ears, nose and throat, cardiovascular, respiratory, GI, , musculoskeletal, integumentary, neurologic, endocrine, hematologic and allergic/immunologic are negative except for what is described above.   No weight loss or weight gain. No change in bowel habits. No blood in stools. No fevers, sweats or chills.     PHYSICAL EXAMINATION:  VITAL SIGNS:  Her blood pressure was 170/100 with a heart rate of 84 and irregular. Respiratory rate 18. O2 sat 96%. Weight 169 pounds. GENERAL:  She is a pleasant 17-year-old female. Denied pain. She was oriented to person and time. Answered questions appropriately. SKIN:  No unusual skin changes. HEENT:  The pupils are equally round and intact. Mucous membranes were dry. NECK:  No JVD. Good carotid pulses. No carotid bruits. No lymphadenopathy or thyromegaly. CARDIOVASCULAR EXAM:  S1 and S2 were normal.  No S3 or S4. She was irregularly irregular. She had a systolic ejection murmur. No diastolic murmur. PMI was normal.  No lift, thrust, or pericardial friction rub. LUNGS:  Clear to auscultation and percussion. ABDOMEN:  Soft and nontender. Good bowel sounds. EXTREMITIES:  Good femoral pulses. Good pedal pulses. No pedal edema. Skin was warm and dry. No calf tenderness. Nail beds pink. Good cap refill. PULSES:  Bilateral symmetrical radial, brachial and carotid pulses. No carotid bruits. Good femoral and pedal pulses. NEUROLOGIC EXAM:  Within normal limits. PSYCHIATRIC EXAM:  Within normal limits.     LABORATORY DATA:  Her sodium was 143, potassium 4.2, BUN 15, creatinine 0.80, GFR greater than 60, calcium 9.4. Her cholesterol was 170 with an HDL of 74, LDL 83, triglycerides 67. ALT was 22, AST was 31. TSH 1.05. Vitamin D was 11.1. White count 8.0, hemoglobin 12.9 with a platelet count of 716,394.     Her EKG showed atrial fibrillation with a controlled ventricular response with nonspecific ST changes laterally.     IMPRESSION:  1. Atrial fibrillation, new onset, discovered when she went to the emergency room on 04/29/2022.  2.  Shortness of breath for four weeks, possibly secondary to atrial fibrillation. 3.  Chest pain, which may be an anginal equivalent. 4.  TIA on 05/18/2021, with no etiology found and her on Plavix since that time, although consider occult paroxysmal atrial fibrillation as an etiology. 5.  Hypertension. 6.  Severe hyperlipidemia, well controlled on Lipitor. 7.  Vitamin D deficiency.     PLAN:  1. Start 5000 units of vitamin D daily. 2.  We will do a cardioversion between 05/22 and 05/24, as she would have been on Eliquis for three weeks. 3.  We will do a Lexiscan stress test.  4.  An echocardiogram has been ordered by Dr. Chyna encarnacion. 5.  I will meet with her after her cardioversion and we will call her with the results of the Lexiscan and echo.     DISCUSSION:  Ms. Delmar Cates has been short of breath for the last four weeks. When she saw Demetria Martinez, she was noted to be in atrial fibrillation and went to our emergency room. Her enzymes were negative. We did place her on Lopressor 25 mg a half a tablet b.i.d. for rate control and it seems to have done a good job.     She is short of breath with activity, which may be due to her atrial fibrillation. Therefore, we will plan on a cardioversion after she has been anticoagulated for three weeks.     However, with her risk factors of hypertension and hyperlipidemia, I am concerned about occult coronary artery disease and we will do a Lexiscan Cardiolite stress test.  An echocardiogram has already been ordered by Dr. Caroline Saldivar.     If her stress test is unremarkable, then we will follow her and see if her symptoms resolve after she has converted to sinus rhythm. We will also be able to evaluate her chest pain better after she is in sinus rhythm. I think there is a fairly high possibility of her having significant coronary artery disease, however.     Thank you very much for allowing me the privilege of seeing Ms. Delmar Cates.   If you have any questions on my thoughts, please do not hesitate to contact me.     Sincerely,           Leah Boudreaux

## 2022-05-23 NOTE — TELEPHONE ENCOUNTER
Pt called with stress test and echo results  Pt did not show for cardioversion  She did not have a   Informed Per Pati she could us hospital [facility]   Will reschedule

## 2022-06-06 ENCOUNTER — TELEPHONE (OUTPATIENT)
Dept: CARDIOLOGY CLINIC | Age: 77
End: 2022-06-06

## 2022-06-06 DIAGNOSIS — I48.91 ATRIAL FIBRILLATION, UNSPECIFIED TYPE (HCC): Primary | ICD-10-CM

## 2022-06-06 NOTE — TELEPHONE ENCOUNTER
DR Paul Roque notified DR Onita Meckel pt in a fib  Does not want to have a cardioversion done  Unable to pay for Elliquis so Dr Paul Roque said  She can go on coumadin   Per Dr Onita Meckel we will do referral to coumadin clinic.

## 2022-06-10 RX ORDER — WARFARIN SODIUM 5 MG/1
TABLET ORAL
Qty: 90 TABLET | Refills: 0 | OUTPATIENT
Start: 2022-06-10 | End: 2022-06-14 | Stop reason: DRUGHIGH

## 2022-06-10 NOTE — PROGRESS NOTES
Spoke with patient and set up initial visit in the Medication Management clinic for Tuesday, 6/14/2022 at 10:20 AM.  Called in new Rx to physician refill line at [facility] for warfarin 5 mg tablets #90, no refills, 6/10/22 at [address] [facility], PharmD

## 2022-06-13 ENCOUNTER — TELEPHONE (OUTPATIENT)
Dept: PHARMACY | Age: 77
End: 2022-06-13

## 2022-06-13 NOTE — TELEPHONE ENCOUNTER
Had message from 6/10 from the patient wanting to know if she can take Plavix and Warfarin together. Returned call but got VM. Left VM stating ok to take together but can wait until tomorrow's appt if she would like to discuss it in greater detail.    Lawayne Gottron, PharmD 6/13/2022 1:19 PM

## 2022-06-14 ENCOUNTER — HOSPITAL ENCOUNTER (OUTPATIENT)
Dept: PHARMACY | Age: 77
Setting detail: THERAPIES SERIES
Discharge: HOME | End: 2022-06-14
Payer: MEDICARE

## 2022-06-14 VITALS
BODY MASS INDEX: 26.66 KG/M2 | DIASTOLIC BLOOD PRESSURE: 93 MMHG | HEART RATE: 81 BPM | WEIGHT: 160.2 LBS | SYSTOLIC BLOOD PRESSURE: 175 MMHG

## 2022-06-14 DIAGNOSIS — I48.91 ATRIAL FIBRILLATION, UNSPECIFIED TYPE (HCC): Primary | ICD-10-CM

## 2022-06-14 LAB — INR BLD: 7.8

## 2022-06-14 PROCEDURE — 99211 OFF/OP EST MAY X REQ PHY/QHP: CPT

## 2022-06-14 PROCEDURE — 85610 PROTHROMBIN TIME: CPT

## 2022-06-14 PROCEDURE — 99202 OFFICE O/P NEW SF 15 MIN: CPT

## 2022-06-14 RX ORDER — WARFARIN SODIUM 5 MG/1
TABLET ORAL
Qty: 90 TABLET | Refills: 0 | Status: SHIPPED
Start: 2022-06-14 | End: 2022-06-16 | Stop reason: DRUGHIGH

## 2022-06-14 RX ORDER — CLOPIDOGREL BISULFATE 75 MG/1
75 TABLET ORAL DAILY
COMMUNITY
Start: 2022-05-13 | End: 2022-06-27 | Stop reason: ALTCHOICE

## 2022-06-14 NOTE — PROGRESS NOTES
[address]  Medication Management  ANTICOAGULATION    Referring Provider: Dr. Manuel Coulter INR: 2.0-3.0    TODAY'S INR: 7.8    WARFARIN Dosage: HOLD WARFARIN AND PLAVIX FOR THE NEXT 2 DAYS    INR (no units)   Date Value   2022 7.8   10/11/2018 1.1       Medication changes:  None    Notes:    Initial visit. Fingerstick INR drawn per clinic protocol. Patient was educated about warfarin and diet and about the potential for interaction with other medications as well as side effects to monitor while on warfarin therapy. Jhon Dillon was initially diagnosed with atrial fibrillation in the ER on 2022 and started Eliquis 5 mg BID and Metoprolol 12.5 mg BID per Dr. Luis Martin. She does have a PMH significant for TIA from 2021 and had started Plavix at that time. She was instructed to stop taking Plavix when she started Eliquis on 2022, but says she restarted the Plavix when she could not afford the Eliquis. She was supposed to have a cardioversion after being on Eliquis for 3 weeks per Dr. Luis Martin, but she does not want to have the cardioversion and cancelled the procedure. Jhon Dillon started warfarin 5 mg daily on 6/10/2022 and says she has had 4 doses as instructed prior to this INR check today. She says she is still taking Plavix 75 mg daily as well right now. Patient states no visible blood in urine and no black tarry stool. All other maintenance medications and supplements reviewed with her medication bottles during the visit today. No recent changes in diet. I have recommended that Jhon Dillon have a salad and/or broccoli over the course of the next couple days. We will also skip her Plavix and warfarin for the next 2 days and then recheck INR in 2 days. Patient acknowledges working in consult agreement with pharmacist as referred by his/her physician.                   For Pharmacy Admin Tracking Only     Intervention Detail: Adherence Monitorin and Dose Adjustment: 1, reason: Therapy Optimization   Total # of Interventions Recommended: 2   Total # of Interventions Accepted: 2   Time Spent (min): [address] [facility] [place], PharmD

## 2022-06-14 NOTE — PATIENT INSTRUCTIONS
Consider eating a salad and/or broccoli today and tomorrow  DO NOT TAKE PLAVIX OR WARFARIN FOR THE NEXT 2 DAYS    Continue current dose of warfarin as instructed on dosing calendar provided. Continue to monitor urine and stool for signs and symptoms of bleeding. Please notify the clinic of any medication changes. Please remember to bring all medications (both prescription and OTC) to your next visit. Kindly notify the clinic if you are unable to make to your next appointment.

## 2022-06-16 ENCOUNTER — HOSPITAL ENCOUNTER (OUTPATIENT)
Dept: PHARMACY | Age: 77
Setting detail: THERAPIES SERIES
Discharge: HOME | End: 2022-06-16
Payer: MEDICARE

## 2022-06-16 VITALS
WEIGHT: 160.2 LBS | BODY MASS INDEX: 26.66 KG/M2 | HEART RATE: 75 BPM | SYSTOLIC BLOOD PRESSURE: 173 MMHG | DIASTOLIC BLOOD PRESSURE: 99 MMHG

## 2022-06-16 DIAGNOSIS — I48.91 ATRIAL FIBRILLATION, UNSPECIFIED TYPE (HCC): Primary | ICD-10-CM

## 2022-06-16 LAB — INR BLD: 7.1

## 2022-06-16 PROCEDURE — 99202 OFFICE O/P NEW SF 15 MIN: CPT

## 2022-06-16 PROCEDURE — 85610 PROTHROMBIN TIME: CPT

## 2022-06-16 RX ORDER — WARFARIN SODIUM 5 MG/1
TABLET ORAL
Qty: 90 TABLET | Refills: 0 | Status: SHIPPED
Start: 2022-06-16 | End: 2022-06-21 | Stop reason: DRUGHIGH

## 2022-06-16 NOTE — PROGRESS NOTES
[address]/[place]  Medication Management  ANTICOAGULATION    Referring Provider: Dr. West Pearson      GOAL INR: 2.0-3.0    TODAY'S INR: 7.1    WARFARIN Dosage: HOLD WARFARIN AND PLAVIX FOR THE NEXT 3 DAYS, then take only 2.5 mg warfarin on 2022 and skip warfarin again on 22    INR (no units)   Date Value   2022 7.1   2022 7.8   10/11/2018 1.1       Medication changes:  Stopped Plavix as of 2022    Notes:    Fingerstick INR drawn per clinic protocol. As discussed during her initial visit, [facility] had been diagnosed with atrial fibrillation in the ER on 2022 and started Eliquis 5 mg BID and Metoprolol 12.5 mg BID per Dr. Timo Borja. She does have a PMH significant for TIA from 2021 and had started Plavix at that time. She was instructed to stop taking Plavix when she started Eliquis on 2022, but had restarted the Plavix when she could not afford Eliquis. [facility] started warfarin 5 mg daily on 6/10/2022 and had taken 4 doses prior to supra-therapeutic INR of 7.8 here in the clinic on 2022. She has skipped her warfarin AND PLAVIX for the past 2 days as instructed and says she did have a \"little\" salad the \"day before yesterday\". Patient states no visible blood in urine and no black tarry stool. No changes in other maintenance medications or in diet. I have recommended that [facility] have another salad and/or broccoli over the course of the next couple days. We will also skip her Plavix and warfarin for an additional 3 days. She will take only 2.5 mg warfarin on , 2022 and then skip warfarin and Plavix again on 2022 prior to her next INR check on Tuesday, 2022. Patient acknowledges working in consult agreement with pharmacist as referred by his/her physician.                   For Pharmacy Admin Tracking Only     Intervention Detail: Adherence Monitorin and Dose Adjustment: 1, reason: Therapy Optimization   Total # of Interventions Recommended: 2   Total # of Interventions Accepted: 2   Time Spent (min): [address] Wang [facility], PharmD

## 2022-06-21 ENCOUNTER — HOSPITAL ENCOUNTER (OUTPATIENT)
Dept: PHARMACY | Age: 77
Setting detail: THERAPIES SERIES
Discharge: HOME | End: 2022-06-21
Payer: MEDICARE

## 2022-06-21 VITALS
WEIGHT: 160.4 LBS | BODY MASS INDEX: 26.69 KG/M2 | DIASTOLIC BLOOD PRESSURE: 95 MMHG | HEART RATE: 74 BPM | SYSTOLIC BLOOD PRESSURE: 170 MMHG

## 2022-06-21 DIAGNOSIS — I48.91 ATRIAL FIBRILLATION, UNSPECIFIED TYPE (HCC): Primary | ICD-10-CM

## 2022-06-21 LAB — INR BLD: 2.4

## 2022-06-21 PROCEDURE — 99211 OFF/OP EST MAY X REQ PHY/QHP: CPT

## 2022-06-21 PROCEDURE — 85610 PROTHROMBIN TIME: CPT

## 2022-06-21 RX ORDER — WARFARIN SODIUM 2.5 MG/1
TABLET ORAL
Qty: 45 TABLET | Refills: 0 | OUTPATIENT
Start: 2022-06-21 | End: 2022-08-19 | Stop reason: SDUPTHER

## 2022-06-21 NOTE — PROGRESS NOTES
[address]  Medication Management  ANTICOAGULATION    Referring Provider: Dr. Iris Stephenson                      GOAL INR: 2.0-3.0     TODAY'S INR: 2.4     WARFARIN Dosage: 1.25 mg x 3 doses and continue to [address] NEXT 3 DAYS    INR (no units)   Date Value   2022 2.4   2022 7.1   2022 7.8   10/11/2018 1.1       Medication changes:  None    Notes:    Fingerstick INR drawn per clinic protocol. As discussed during her initial visit, Blaire Barrera had been diagnosed with atrial fibrillation in the ER on 2022 and started Eliquis 5 mg BID and Metoprolol 12.5 mg BID per Dr. Jacek Blandon. She does have a PMH significant for TIA from 2021 and had started Plavix at that time. She was instructed to stop taking Plavix by the ER physician when she started Eliquis on 2022, but had restarted the Plavix on her own when she could not afford Eliquis. Blaire Barrera started warfarin on 6/10/2022 and has had a total of 22.5 mg warfarin as instructed over the course of the past 11 days prior to this INR check today. She also took her last dose of Plavix on 2022 as instructed by our clinic. Patient states no visible blood in urine and no black tarry stool. No changes in other maintenance medications or in diet. We will call in a new Rx for the 2.5 mg warfarin tablets to her local pharmacy and she will take only 1/2 tablet (1.25 mg warfarin) for the next 3 doses as noted on her dosing calendar. We will recheck her INR again in 3 days and reassess further warfarin dosing at that time. Blaire Barrera has a follow up EKG and appointment scheduled with Dr. Jacek Blandon on Monday, 2022.  Patient acknowledges working in consult agreement with pharmacist as referred by his/her physician.                    For Pharmacy Admin Tracking Only     Intervention Detail: Adherence Monitorin, Dose Adjustment: 1, reason: Therapy Optimization and New Rx: 1, reason: Needs Additional Therapy   Total # of Interventions Recommended: 3   Total # of Interventions Accepted: 3   Time Spent (min): [address] Alyssa Barajas [facility] [place], PharmD

## 2022-06-24 ENCOUNTER — HOSPITAL ENCOUNTER (OUTPATIENT)
Dept: PHARMACY | Age: 77
Setting detail: THERAPIES SERIES
Discharge: HOME | End: 2022-06-24
Payer: MEDICARE

## 2022-06-24 VITALS
DIASTOLIC BLOOD PRESSURE: 104 MMHG | SYSTOLIC BLOOD PRESSURE: 191 MMHG | WEIGHT: 162 LBS | HEART RATE: 76 BPM | BODY MASS INDEX: 26.96 KG/M2

## 2022-06-24 DIAGNOSIS — I48.91 ATRIAL FIBRILLATION, UNSPECIFIED TYPE (HCC): Primary | ICD-10-CM

## 2022-06-24 LAB — INR BLD: 3.1

## 2022-06-24 PROCEDURE — 85610 PROTHROMBIN TIME: CPT

## 2022-06-24 PROCEDURE — 99211 OFF/OP EST MAY X REQ PHY/QHP: CPT

## 2022-06-24 NOTE — PROGRESS NOTES
[facility]  Medication Management  ANTICOAGULATION    Referring Provider: Dr. Ron Sierra     GOAL INR: 2.0-3.0     TODAY'S INR: 3.1     WARFARIN Dosage: HOLD x 1 dose, then 1.25 mg x 2 doses and continue to [address]    INR (no units)   Date Value   2022 3.1   2022 2.4   2022 7.1   2022 7.8   10/11/2018 1.1       Medication changes: Will continue to HOLD Plavix for the next 3 days    Notes:    Fingerstick INR drawn per clinic protocol. Patient states no visible blood in urine and no black tarry stool. Following her INR of 2.4 on 2022, Andreas Heller has taken 1.25 mg warfarin for the past 3 doses as instructed prior to this INR check today. Denies any missed doses of warfarin. No change in other maintenance medications or in diet. Andreas Heller denies having had any \"greens\" over the course of the past 3 days. Since INR has increased from 2.4 to 3.1 today, we will HOLD her warfarin dose today, then have her take 1.25 mg warfarin for 2 doses thereafter as noted on her dosing calendar. She will also continue to HOLD Plavix at this time. Andreas Heller has an appointment with Dr. Martin Dodd on Monday, 2022. We will recheck INR in 3 days prior to her appointment with Dr. Martin Dodd. Patient acknowledges working in consult agreement with pharmacist as referred by his/her physician.                   For Pharmacy Admin Tracking Only     Intervention Detail: Adherence Monitorin and Dose Adjustment: 1, reason: Therapy Optimization   Total # of Interventions Recommended: 2   Total # of Interventions Accepted: 2   Time Spent (min): [address] [facility], [facility], PharmD

## 2022-06-27 ENCOUNTER — OFFICE VISIT (OUTPATIENT)
Dept: CARDIOLOGY CLINIC | Age: 77
End: 2022-06-27
Payer: MEDICARE

## 2022-06-27 ENCOUNTER — HOSPITAL ENCOUNTER (OUTPATIENT)
Age: 77
Discharge: HOME | End: 2022-06-27
Payer: MEDICARE

## 2022-06-27 VITALS
BODY MASS INDEX: 26.89 KG/M2 | DIASTOLIC BLOOD PRESSURE: 115 MMHG | SYSTOLIC BLOOD PRESSURE: 187 MMHG | WEIGHT: 161.6 LBS | HEART RATE: 83 BPM

## 2022-06-27 VITALS
HEART RATE: 88 BPM | WEIGHT: 161 LBS | SYSTOLIC BLOOD PRESSURE: 180 MMHG | DIASTOLIC BLOOD PRESSURE: 90 MMHG | BODY MASS INDEX: 26.79 KG/M2 | OXYGEN SATURATION: 98 %

## 2022-06-27 DIAGNOSIS — R06.02 SOB (SHORTNESS OF BREATH): Primary | ICD-10-CM

## 2022-06-27 DIAGNOSIS — I48.91 ATRIAL FIBRILLATION, UNSPECIFIED TYPE (HCC): ICD-10-CM

## 2022-06-27 DIAGNOSIS — R06.02 SOB (SHORTNESS OF BREATH): ICD-10-CM

## 2022-06-27 LAB — INR BLD: 2.4

## 2022-06-27 PROCEDURE — 93005 ELECTROCARDIOGRAM TRACING: CPT

## 2022-06-27 PROCEDURE — 1123F ACP DISCUSS/DSCN MKR DOCD: CPT | Performed by: INTERNAL MEDICINE

## 2022-06-27 PROCEDURE — 99214 OFFICE O/P EST MOD 30 MIN: CPT | Performed by: INTERNAL MEDICINE

## 2022-06-27 PROCEDURE — 85610 PROTHROMBIN TIME: CPT

## 2022-06-27 PROCEDURE — 99211 OFF/OP EST MAY X REQ PHY/QHP: CPT

## 2022-06-27 RX ORDER — DOXAZOSIN 2 MG/1
2 TABLET ORAL NIGHTLY
Qty: 30 TABLET | Refills: 3 | Status: SHIPPED | OUTPATIENT
Start: 2022-06-27 | End: 2022-08-18 | Stop reason: DRUGHIGH

## 2022-06-27 RX ORDER — METOPROLOL SUCCINATE 25 MG/1
25 TABLET, EXTENDED RELEASE ORAL DAILY
Qty: 30 TABLET | Refills: 11 | Status: SHIPPED | OUTPATIENT
Start: 2022-06-27

## 2022-06-27 NOTE — PROGRESS NOTES
[address]/[place]  Medication Management  ANTICOAGULATION    Referring Provider: Dr. Saleem Sterling                      GOAL INR: 2.0-3.0     TODAY'S INR: 2.4     WARFARIN Dosage: 1.25 mg x 4 doses and continue to HOLD PLAVIX FOR THE NEXT 4 DAYS    INR (no units)   Date Value   06/27/2022 2.4   06/24/2022 3.1   06/21/2022 2.4   06/16/2022 7.1   06/14/2022 7.8   10/11/2018 1.1       Medication changes:  None    Notes:    Fingerstick INR drawn per clinic protocol. Patient states no visible blood in urine and no black tarry stool. Following her INR of 3.1 on 6/24/2022, Royce Vance skipped 1 dose of warfarin and has taken 1.25 mg warfarin for the past 2 doses as instructed prior to this INR check today. As discussed previously, she had been diagnosed with A-fib in the ER on 4/29/2022 and had started Eliquis 5 mg BID and Metoprolol 12.5 mg BID per Dr. Charles Ayala. She does have a PMH significant for TIA from 5/18/2021 and had already been on Plavix since that time. She was instructed to stop taking Plavix by the ER physician when she started Eliquis on 4/29/2022, but had restarted the Plavix on her own when she could not afford Eliquis. We had her stop the Plavix on 6/14/2022 after she had started warfarin and INR had initially jumped supra-therapeutic on 6/14/2022. No change in other maintenance medications or in diet. Royce Vance denies having had any \"greens\" over the course of the past 3 days. Royce Vance says she has been having trouble sleeping at night so we discussed trying to switch the Atorvastatin to AM since 5% patients can experience insomnia with Atorvastatin. Since INR has decreased from 3.1 to 2.4 today, we will continue 1.25 mg warfarin for 4 additional doses as noted on her dosing calendar. Royce Vance will see Dr. Charles Ayala after this appointment and discuss if she is still OK to stay off Plavix at this time. We will recheck INR in 4 days and reassess further warfarin dosing at that time.  Patient acknowledges working in consult agreement with pharmacist as referred by his/her physician.                 For Pharmacy Admin Tracking Only     Intervention Detail: Adherence Monitorin and Dose Adjustment: 1, reason: Therapy Optimization   Total # of Interventions Recommended: 2   Total # of Interventions Accepted: 2   Time Spent (min): [address], [facility], PharmD

## 2022-06-28 ENCOUNTER — HOSPITAL ENCOUNTER (OUTPATIENT)
Dept: PHARMACY | Age: 77
Setting detail: THERAPIES SERIES
Discharge: HOME | End: 2022-06-28
Payer: MEDICARE

## 2022-06-28 DIAGNOSIS — I48.91 ATRIAL FIBRILLATION, UNSPECIFIED TYPE (HCC): Primary | ICD-10-CM

## 2022-06-28 LAB
EKG ATRIAL RATE: 63 BPM
EKG Q-T INTERVAL: 372 MS
EKG QRS DURATION: 122 MS
EKG QTC CALCULATION (BAZETT): 445 MS
EKG R AXIS: 43 DEGREES
EKG T AXIS: 94 DEGREES
EKG VENTRICULAR RATE: 86 BPM

## 2022-06-28 PROCEDURE — 93010 ELECTROCARDIOGRAM REPORT: CPT | Performed by: INTERNAL MEDICINE

## 2022-07-01 ENCOUNTER — HOSPITAL ENCOUNTER (OUTPATIENT)
Dept: PHARMACY | Age: 77
Setting detail: THERAPIES SERIES
Discharge: HOME | End: 2022-07-01
Payer: MEDICARE

## 2022-07-01 VITALS
WEIGHT: 163.2 LBS | DIASTOLIC BLOOD PRESSURE: 86 MMHG | HEART RATE: 75 BPM | SYSTOLIC BLOOD PRESSURE: 147 MMHG | BODY MASS INDEX: 27.16 KG/M2

## 2022-07-01 DIAGNOSIS — I48.91 ATRIAL FIBRILLATION, UNSPECIFIED TYPE (HCC): Primary | ICD-10-CM

## 2022-07-01 LAB — INR BLD: 3

## 2022-07-01 PROCEDURE — 99211 OFF/OP EST MAY X REQ PHY/QHP: CPT

## 2022-07-01 PROCEDURE — 85610 PROTHROMBIN TIME: CPT

## 2022-07-01 NOTE — PROGRESS NOTES
[facility] [address] [place] [address]/[place]  Medication Management  ANTICOAGULATION    Referring Provider: Dr. José Tuttle     GOAL INR: 2.0-3.0     TODAY'S INR: 3.0     WARFARIN Dosage: HOLD x 1, then 1.25 mg daily    INR (no units)   Date Value   2022 3   2022 2.4   2022 3.1   2022 2.4   2022 7.1   2022 7.8   10/11/2018 1.1       Medication changes:  Started Cardura 2 mg QD  Metoprolol Succinate 12.5 mg BID changed to Metoprolol 25 mg XL QD  Plavix officially stopped  Atorvastatin now taken QAM    Notes:    Fingerstick INR drawn per clinic protocol. Patient states no visible blood in urine and no black tarry stool. Denies any missed or extra doses of warfarin. Dr Garret Johnson made a few adjustments to medication including starting Cardura 2 mg QD, changing Metoprolol Succinate 12.5 mg BID to Metoprolol 25 mg XL QD, officially stopping Plavix and changing Atorvastatin to AM dosing to help with insomnia. Pt's BP much better and insomnia slightly improved. No change in other maintenance medications or in diet. Although at goal, will hold 1 dose before resuming previous dosing as she is a new patient and her dose cannot be dropped any without holding a dose. Will recheck INR in 1 week. Patient acknowledges working in consult agreement with pharmacist as referred by his/her physician.                   For Pharmacy Admin Tracking Only     Intervention Detail: Adherence Monitorin and Dose Adjustment: 1, reason: Therapy Optimization   Total # of Interventions Recommended: 2   Total # of Interventions Accepted: 2   Time Spent (min): 20    Brandon Capone, Ree 2022 9:57 AM

## 2022-07-04 NOTE — PROGRESS NOTES
Americo Heller M.D. [address] [place] [address]  [phone]          2022          Efrain Cowart MD  [facility] [address]  [place] [address]      RE:   Radha Ricardo  :  1945      Dear Dr. Avi Mendez:    CHIEF COMPLAINT:  1. Atrial fibrillation. 2.  Status post cardioversion on 2022 to sinus rhythm, with her reverting back to atrial fibrillation again. 3.  Totally asymptomatic. 4.  On Coumadin for anticoagulation. HISTORY OF PRESENT ILLNESS:  I had the pleasure of seeing Ms. Radha Ricardo again in our office on 2022. I trust you received my H and P from 2022. As you know, she had shortness of breath and went to the emergency room on 2022, and was found to be in atrial fibrillation. She was started on Eliquis 5 mg b.i.d. 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 She was anticoagulated for three weeks. We did a cardioversion on 2022. She did convert to sinus rhythm but reverted back to atrial fibrillation. I am seeing her today in followup. She was unable to afford Eliquis and therefore started going to our [facility]. She has done well. She has had no unusual shortness of breath. No chest pain or chest discomfort. No PND or orthopnea or unusual pedal edema. She is seeing Umu Jeong in Coumadin Clinic and her INRs have been in a good range. Her blood pressure was very elevated at 180/90 in the Coumadin Clinic. It was 200/90 here in our office. She takes her blood pressure at home and it is generally in the 150 to 160 range. She has had no syncope or near syncope, no lightheadedness or dizziness. She cannot tell that she is in atrial fibrillation.     MEDICATIONS:  Her medications today are Lipitor 80 mg daily, Vasotec 20 mg two tablets daily, Toprol-XL 25 mg half a tablet b.i.d., potassium 10 mEq daily, Ultram 50 mg p.r.n., Coumadin as directed. PHYSICAL EXAMINATION:  VITAL SIGNS:  Her blood pressure was 180/90 with a heart rate of 70 and irregular. Respiratory rate 18. O2 sat was 94%. GENERAL:  She is a very pleasant 55-year-old female. Denied pain. She was oriented to person, place and time. Answered questions appropriately. SKIN:  No unusual skin changes. HEENT:  The pupils are equally round and intact. Mucous membranes were dry. NECK:  No JVD. Good carotid pulses. No carotid bruits. No lymphadenopathy or thyromegaly. CARDIOVASCULAR EXAM:  S1 and S2 were normal.  No S3 or S4. Soft systolic blowing type murmur. No diastolic murmur. PMI was normal.  No lift, thrust, or pericardial friction rub. LUNGS:  Quite clear to auscultation and percussion. ABDOMEN:  Soft and nontender. Good bowel sounds. EXTREMITIES:  Good femoral pulses. Good pedal pulses. She had mild pedal edema. Skin was warm and dry. No calf tenderness. Nail beds pink. Good cap refill. PULSES:  Bilateral symmetrical radial, brachial and carotid pulses. No carotid bruits. Good femoral and pedal pulses. NEUROLOGIC EXAM:  Within normal limits. PSYCHIATRIC EXAM:  Within normal limits. EKG was done which showed atrial fibrillation with controlled ventricular response. IMPRESSION:  1. Hypertension, very elevated at home in the 150 to 160 range and in our office 200/100 range. 2.  Atrial fibrillation found when she came to the emergency room with shortness of breath on 04/29/2022.  3.  Lexiscan Cardiolite stress test was read as normal on 05/15/2022. 4.  Normal LV function by echocardiogram on 05/18/2022, with dilated left atrium and right atrium. 5.  Cardioversion to sinus rhythm on 05/23/2022, with her reverting back to atrial fibrillation, which is asymptomatic. 6.  On Coumadin for anticoagulation. PLAN:  1. We will not attempt to re-convert to sinus rhythm as she is asymptomatic.   2.  We will add Cardura 2 mg daily for hypertension. 3.  She will call us in one week with her blood pressure and we will adjust Cardura to achieve a pressure less than 140/80 at home. 4.  We will see in 6 weeks with no testing, to do a blood pressure check. DISCUSSION:  Clinically, Ms. Estela Narayanan is doing well. She has had no shortness of breath, loss of energy or any chest pain. Her stress test was equivocal, but she is asymptomatic and I would not proceed with a catheterization at this time. She was in atrial fibrillation, I did convert her, but she reverted back to atrial fibrillation again now. However, because she is asymptomatic and her rate is controlled, I will not attempt any repeat cardioversion. Again, I will hold off on a cardiac catheterization at this time. I agree with leaving her off Plavix and she will be on Coumadin alone. If she would start developing worsening shortness of breath, then I would consider doing a cardiac catheterization as her stress test was equivocal.  However, at this time, I will not proceed with any catheterization. Thank you very much for allowing me the privilege of seeing Hernando Mi. If you have any questions on my thoughts, please do not hesitate to contact me.      Sincerely,        Vik Ugalde    D: 06/27/2022 12:46:54     T: 06/27/2022 12:49:59     [id number]  Job#: [id number]   Doc#: [id number]

## 2022-07-07 ENCOUNTER — HOSPITAL ENCOUNTER (OUTPATIENT)
Dept: PHARMACY | Age: 77
Setting detail: THERAPIES SERIES
Discharge: HOME | End: 2022-07-07
Payer: MEDICARE

## 2022-07-07 VITALS — WEIGHT: 167.4 LBS | BODY MASS INDEX: 27.86 KG/M2

## 2022-07-07 DIAGNOSIS — I48.91 ATRIAL FIBRILLATION, UNSPECIFIED TYPE (HCC): Primary | ICD-10-CM

## 2022-07-07 LAB — INR BLD: 2.5

## 2022-07-07 PROCEDURE — 99211 OFF/OP EST MAY X REQ PHY/QHP: CPT

## 2022-07-07 PROCEDURE — 85610 PROTHROMBIN TIME: CPT

## 2022-07-07 NOTE — PROGRESS NOTES
[place] [address] [place] [address]/[place]  Medication Management  ANTICOAGULATION    Referring Provider: Dr. Truman Mast     GOAL INR: 2.0-3.0     TODAY'S INR: 2.5     WARFARIN Dosage: 1.25 mg daily    INR (no units)   Date Value   2022 2.5   2022 3   2022 2.4   2022 3.1   2022 2.4   2022 7.1   2022 7.8       Medication changes:  None    Notes:    Fingerstick INR drawn per clinic protocol. Patient states no visible blood in urine and no black tarry stool. Denies any missed doses of warfarin. No change in other maintenance medications or in diet. Continue current dosing of 1.25 mg QD. Will recheck INR in 2 weeks. Patient acknowledges working in consult agreement with pharmacist as referred by his/her physician.                   For Pharmacy Admin Tracking Only     Intervention Detail: Adherence Monitorin   Total # of Interventions Recommended: 1   Total # of Interventions Accepted: 1   Time Spent (min): 20    Jemal DavidsonD 2022 9:42 AM

## 2022-07-20 ENCOUNTER — HOSPITAL ENCOUNTER (OUTPATIENT)
Dept: PHARMACY | Age: 77
Setting detail: THERAPIES SERIES
Discharge: HOME | End: 2022-07-20
Payer: MEDICARE

## 2022-07-20 VITALS
BODY MASS INDEX: 27.76 KG/M2 | DIASTOLIC BLOOD PRESSURE: 104 MMHG | SYSTOLIC BLOOD PRESSURE: 166 MMHG | WEIGHT: 166.8 LBS | HEART RATE: 63 BPM

## 2022-07-20 DIAGNOSIS — I48.91 ATRIAL FIBRILLATION, UNSPECIFIED TYPE (HCC): Primary | ICD-10-CM

## 2022-07-20 LAB — INR BLD: 2.6

## 2022-07-20 PROCEDURE — 85610 PROTHROMBIN TIME: CPT

## 2022-07-20 PROCEDURE — 99211 OFF/OP EST MAY X REQ PHY/QHP: CPT

## 2022-07-20 NOTE — PROGRESS NOTES
[facility] [address] [place] [address]/[place]  Medication Management  ANTICOAGULATION    Referring Provider: Dr. Dolores Duff INR: 2.0-3.0     TODAY'S INR: 2.5     WARFARIN Dosage: 1.25 mg daily    INR (no units)   Date Value   2022 2.6   2022 2.5   2022 3   2022 2.4   2022 3.1   2022 2.4   2022 7.1       Medication changes:  None    Notes:    Fingerstick INR drawn per clinic protocol. Patient states no visible blood in urine and no black tarry stool. Denies any missed or extra doses of warfarin. No change in other maintenance medications or in diet. Continue current dosing as above. Will recheck INR in 4 weeks. Patient acknowledges working in consult agreement with pharmacist as referred by his/her physician.                   For Pharmacy Admin Tracking Only    Intervention Detail: Adherence Monitorin  Total # of Interventions Recommended: 1  Total # of Interventions Accepted: 1  Time Spent (min): 20    Leopold Baas, PharmD 2022 8:57 AM

## 2022-08-18 ENCOUNTER — OFFICE VISIT (OUTPATIENT)
Dept: CARDIOLOGY CLINIC | Age: 77
End: 2022-08-18
Payer: MEDICARE

## 2022-08-18 ENCOUNTER — HOSPITAL ENCOUNTER (OUTPATIENT)
Dept: PHARMACY | Age: 77
Setting detail: THERAPIES SERIES
Discharge: HOME | End: 2022-08-18
Payer: MEDICARE

## 2022-08-18 VITALS
BODY MASS INDEX: 27.29 KG/M2 | WEIGHT: 164 LBS | DIASTOLIC BLOOD PRESSURE: 90 MMHG | SYSTOLIC BLOOD PRESSURE: 180 MMHG | OXYGEN SATURATION: 96 % | HEART RATE: 79 BPM

## 2022-08-18 VITALS
WEIGHT: 164.6 LBS | SYSTOLIC BLOOD PRESSURE: 168 MMHG | DIASTOLIC BLOOD PRESSURE: 79 MMHG | BODY MASS INDEX: 27.39 KG/M2 | HEART RATE: 70 BPM

## 2022-08-18 DIAGNOSIS — E78.5 HYPERLIPIDEMIA, UNSPECIFIED HYPERLIPIDEMIA TYPE: ICD-10-CM

## 2022-08-18 DIAGNOSIS — I10 PRIMARY HYPERTENSION: ICD-10-CM

## 2022-08-18 DIAGNOSIS — I48.91 ATRIAL FIBRILLATION, UNSPECIFIED TYPE (HCC): Primary | ICD-10-CM

## 2022-08-18 LAB — INR BLD: 2.9

## 2022-08-18 PROCEDURE — 99212 OFFICE O/P EST SF 10 MIN: CPT | Performed by: INTERNAL MEDICINE

## 2022-08-18 PROCEDURE — 99211 OFF/OP EST MAY X REQ PHY/QHP: CPT

## 2022-08-18 PROCEDURE — 85610 PROTHROMBIN TIME: CPT

## 2022-08-18 RX ORDER — POTASSIUM CHLORIDE 750 MG/1
10 TABLET, FILM COATED, EXTENDED RELEASE ORAL DAILY
Qty: 30 TABLET | Refills: 11 | Status: SHIPPED | OUTPATIENT
Start: 2022-08-18

## 2022-08-18 RX ORDER — DOXAZOSIN 2 MG/1
2 TABLET ORAL 2 TIMES DAILY
Qty: 60 TABLET | Refills: 11 | Status: SHIPPED | OUTPATIENT
Start: 2022-08-18 | End: 2022-09-19 | Stop reason: SDUPTHER

## 2022-08-18 RX ORDER — ATORVASTATIN CALCIUM 80 MG/1
80 TABLET, FILM COATED ORAL DAILY
Qty: 30 TABLET | Refills: 11 | Status: SHIPPED | OUTPATIENT
Start: 2022-08-18

## 2022-08-18 RX ORDER — DOXAZOSIN 2 MG/1
2 TABLET ORAL 2 TIMES DAILY
COMMUNITY
End: 2022-08-18 | Stop reason: SDUPTHER

## 2022-08-18 NOTE — PROGRESS NOTES
Ov Dr. Juan Vigil for 6 week follow up   No palpitations  No chst pain   No heaviness   Breathing is \"pretty good\"  C/o edema worse when in sun  Will have water blisters  X 2 months         Will INCREASE Doxazosin (Cardura) 2 mg   To one tablet twice a day     Continue to monitor BP/HR daily - different times of the day - call in 2 weeks with reading     Follow up in 6 months

## 2022-08-18 NOTE — PATIENT INSTRUCTIONS
Will INCREASE Doxazosin (Cardura) 2 mg   To one tablet twice a day     Continue to monitor BP/HR daily - different times of the day - call in 2 weeks with reading     Follow up in 6 months

## 2022-08-19 RX ORDER — WARFARIN SODIUM 2.5 MG/1
TABLET ORAL
Qty: 45 TABLET | Refills: 3 | OUTPATIENT
Start: 2022-08-19 | End: 2022-09-06 | Stop reason: SDUPTHER

## 2022-08-23 NOTE — PROGRESS NOTES
Mike Meyers M.D. [address] [place], [place] [address]   [phone]        2022        Get Byers MD  [facility] [address]  [place]    RE:   Obi Body  :  1945    Dear Dr. Erendira Stewart:    CHIEF COMPLAINT:  1.  Palpitations. 2.  Hypertension. 3.  Atrial fibrillation. HISTORY OF PRESENT ILLNESS:  I had the pleasure of seeing Aidee Lin in our office in followup on 2022. I trust you received my full H and P from 2022. At that time, she was in atrial fibrillation. She was asymptomatic and therefore, I decided not to attempt to re-convert to sinus rhythm. We added Cardura 2 mg daily for hypertension, and we brought her back to do a blood pressure check. She has done well. She has had no chest pain or chest discomfort. No unusual shortness of breath, no palpitations. Her breathing is \"pretty good. \"    She has edema, which is worse in the sun. Her blood pressure was 180/90, heart rate was 79, O2 sat was 96%, weight 164 pounds. She brought her blood pressures in from the home and they are also in the 150 to 160/80 to 90. MEDICATIONS AT THIS TIME:  Lipitor 80 mg daily, vitamin D 5000 units daily, Cardura 2 mg daily, Vasotec 20 mg b.i.d., Toprol-XL 25 mg daily, Protonix 40 mg daily, potassium daily, Ultram p.r.n., Coumadin as directed. IMPRESSION:  1. Hypertension, still elevated. 2.  Chronic atrial fibrillation, on rate control. PLAN:  1. We will increase Cardura to 2 mg b.i.d.  2.  She will monitor her blood pressure and heart rate daily at different times of the day and call in 2 weeks with the reading. 3.  We will make any adjustments over the phone. 4.  We will follow up for a full evaluation in 6 months. DISCUSSION:  Overall, Ola Scheuermann is doing well. Her blood pressure, however, is still elevated and we will increase her Cardura to 2 mg b.i.d.   She will call us in 2 weeks with her blood pressures and heart rates. I will plan on seeing her in 6 months. Thank you very much for allowing me the privilege of seeing Mrs. Chloe Min. If you have any questions on my thoughts, please do not hesitate to contact me.     Sincerely,        Mandy Wetzel    D: 08/22/2022 2:53:20     T: 08/22/2022 12:22:48     [id number]  Job#: [id number]   Doc#: [id number]

## 2022-09-06 ENCOUNTER — TELEPHONE (OUTPATIENT)
Dept: PHARMACY | Age: 77
End: 2022-09-06

## 2022-09-06 RX ORDER — WARFARIN SODIUM 2.5 MG/1
TABLET ORAL
Qty: 45 TABLET | Refills: 3 | Status: SHIPPED | OUTPATIENT
Start: 2022-09-06 | End: 2022-09-14 | Stop reason: SDUPTHER

## 2022-09-06 NOTE — TELEPHONE ENCOUNTER
Pt called [facility] to discuss frustration with billing. States she was not told that there would be a $30 copay for each visit. Also states that she is on a fixed income and has difficulty affording these visits. She also asked if there would be any side effects to stopping Coumadin. Explained that while there would be no specific side effects per se, reminded of the dangers of stopping based on her diagnosis. Encouraged her to talk to  and to also discuss other treatment options with her MD. She will look into all of this prior to her next clinic appt which, at this time, she would like to keep. No other questions.   Pietro Hussein, PharmD 9/6/2022 3:33 PM

## 2022-09-14 ENCOUNTER — HOSPITAL ENCOUNTER (OUTPATIENT)
Dept: PHARMACY | Age: 77
Setting detail: THERAPIES SERIES
Discharge: HOME | End: 2022-09-14
Payer: MEDICARE

## 2022-09-14 VITALS
DIASTOLIC BLOOD PRESSURE: 105 MMHG | SYSTOLIC BLOOD PRESSURE: 163 MMHG | HEART RATE: 76 BPM | WEIGHT: 166.2 LBS | BODY MASS INDEX: 27.66 KG/M2

## 2022-09-14 DIAGNOSIS — I48.91 ATRIAL FIBRILLATION, UNSPECIFIED TYPE (HCC): Primary | ICD-10-CM

## 2022-09-14 LAB — INR BLD: 2.7

## 2022-09-14 PROCEDURE — 85610 PROTHROMBIN TIME: CPT

## 2022-09-14 PROCEDURE — 99211 OFF/OP EST MAY X REQ PHY/QHP: CPT

## 2022-09-14 RX ORDER — WARFARIN SODIUM 2.5 MG/1
TABLET ORAL
Qty: 45 TABLET | Refills: 3 | OUTPATIENT
Start: 2022-09-14 | End: 2022-10-28 | Stop reason: DRUGHIGH

## 2022-09-14 NOTE — PROGRESS NOTES
[facility] [address] [place] [address]/[place]  Medication Management  ANTICOAGULATION    Referring Provider: Dr. Miguelito Tavera INR: 2.0-3.0     TODAY'S INR: 2.7     WARFARIN Dosage: 1.25 mg daily    INR (no units)   Date Value   2022 2.7   2022 2.9   2022 2.6   2022 2.5   2022 3   2022 2.4   2022 3.1       Medication changes:  None    Notes:    Fingerstick INR drawn per clinic protocol. Patient states no visible blood in urine and no black tarry stool. Denies any missed or extra doses of warfarin. No change in maintenance medications or in diet. Continue current dosing. New Rx called in to [facility] per request. Will recheck INR in 6 weeks. Patient acknowledges working in consult agreement with pharmacist as referred by his/her physician.                   For Pharmacy Admin Tracking Only    Intervention Detail: Adherence Monitorin  Total # of Interventions Recommended: 1  Total # of Interventions Accepted: 1  Time Spent (min): 20    Royce Berman PharmD 2022 3:30 PM

## 2022-09-19 RX ORDER — DOXAZOSIN 2 MG/1
2 TABLET ORAL 2 TIMES DAILY
Qty: 60 TABLET | Refills: 11 | Status: SHIPPED | OUTPATIENT
Start: 2022-09-19

## 2022-10-28 ENCOUNTER — HOSPITAL ENCOUNTER (OUTPATIENT)
Dept: PHARMACY | Age: 77
Setting detail: THERAPIES SERIES
Discharge: HOME | End: 2022-10-28
Payer: MEDICARE

## 2022-10-28 VITALS
WEIGHT: 173.8 LBS | DIASTOLIC BLOOD PRESSURE: 94 MMHG | HEART RATE: 69 BPM | BODY MASS INDEX: 28.92 KG/M2 | SYSTOLIC BLOOD PRESSURE: 178 MMHG

## 2022-10-28 DIAGNOSIS — I48.91 ATRIAL FIBRILLATION, UNSPECIFIED TYPE (HCC): Primary | ICD-10-CM

## 2022-10-28 LAB — INR BLD: 3.6

## 2022-10-28 PROCEDURE — 99211 OFF/OP EST MAY X REQ PHY/QHP: CPT

## 2022-10-28 PROCEDURE — 85610 PROTHROMBIN TIME: CPT

## 2022-10-28 RX ORDER — WARFARIN SODIUM 2.5 MG/1
TABLET ORAL
Qty: 45 TABLET | Refills: 3 | Status: SHIPPED
Start: 2022-10-28

## 2022-10-28 NOTE — PROGRESS NOTES
[facility] [address] [facility]/[place]  Medication Management  ANTICOAGULATION    Referring Provider: Dr. Gregory Maciel INR: 2.0-3.0     TODAY'S INR: 3.6     WARFARIN Dosage: HOLD dose every other Friday and take 1.25 mg all other days of the week    INR (no units)   Date Value   10/28/2022 3.6   2022 2.7   2022 2.9   2022 2.6   2022 2.5   2022 3   2022 2.4       Medication changes:  Increased use of APAP d/t knee/back pain (up to 4 G daily)    Notes:    Fingerstick INR drawn per clinic protocol. Patient states no visible blood in urine and no black tarry stool. Denies any missed or extra doses of warfarin. Increased use of APAP due to knee/back pain and sometimes needs up to 4 G daily. Encouraged use of Tramadol on the really bad pain days. No change in maintenance medications or in diet. Believed she will continue to need higher doses of APAP with the changing weather but holding her Coumadin dose weekly would be >14% change which is believed to be too much. Will try holding Coumadin every OTHER Friday and 1.25 mg all other days. Pt agreed to try this schedule and a dosing calendar was provided. Will recheck INR in 5 weeks (daughter having surgery in a couple weeks and she has Thanksgiving holiday plans so scheduled out farther than preferred). Patient acknowledges working in consult agreement with pharmacist as referred by his/her physician.                   For Pharmacy Admin Tracking Only    Intervention Detail: Adherence Monitorin and Dose Adjustment: 1, reason: Therapy Optimization  Total # of Interventions Recommended: 2  Total # of Interventions Accepted: 2  Time Spent (min): 20    Bismark Anaya, PharmD 10/28/2022 1:51 PM

## 2022-10-28 NOTE — PATIENT INSTRUCTIONS
Decrease current dose of warfarin as instructed on dosing calendar provided. Continue to monitor urine and stool for signs and symptoms of bleeding. Please notify the clinic of any medication changes. Kindly notify the clinic if you are unable to make to your next appointment.

## 2022-12-09 ENCOUNTER — HOSPITAL ENCOUNTER (OUTPATIENT)
Dept: PHARMACY | Age: 77
Setting detail: THERAPIES SERIES
Discharge: HOME | End: 2022-12-09
Payer: MEDICARE

## 2022-12-09 VITALS — DIASTOLIC BLOOD PRESSURE: 82 MMHG | SYSTOLIC BLOOD PRESSURE: 136 MMHG

## 2022-12-09 DIAGNOSIS — I48.91 ATRIAL FIBRILLATION, UNSPECIFIED TYPE (HCC): Primary | ICD-10-CM

## 2022-12-09 LAB — INR BLD: 2.9

## 2022-12-09 PROCEDURE — 99211 OFF/OP EST MAY X REQ PHY/QHP: CPT

## 2022-12-09 PROCEDURE — 85610 PROTHROMBIN TIME: CPT

## 2022-12-09 RX ORDER — FUROSEMIDE 20 MG/1
20 TABLET ORAL DAILY
COMMUNITY

## 2022-12-09 NOTE — PROGRESS NOTES
[facility] [address] [place] [address]/[place]  Medication Management  ANTICOAGULATION    Referring Provider: Dr. Shonda Mariano INR: 2.0-3.0     TODAY'S INR: 2.9     WARFARIN Dosage: HOLD dose every other Friday and take 1.25 mg all other days of the week    INR (no units)   Date Value   2022 2.9   10/28/2022 3.6   2022 2.7   2022 2.9   2022 2.6   2022 2.5   2022 3       Medication changes:  Started Lasix 20 mg QD     Notes:    Fingerstick INR drawn per clinic protocol. Patient states no visible blood in urine and no black tarry stool. Denies any missed or extra doses of warfarin. Started Lasix 20 mg QD but no change in other maintenance medications or in diet. Therapeutic INR and pt had no issues with skipping every other Friday so no dosage changes at this time. Will recheck INR in 6 weeks. Patient acknowledges working in consult agreement with pharmacist as referred by his/her physician.                   For Pharmacy Admin Tracking Only    Intervention Detail: Adherence Monitorin  Total # of Interventions Recommended: 1  Total # of Interventions Accepted: 1  Time Spent (min): 20    Josphine Merlin, PharmD 2022 11:39 AM

## 2022-12-22 RX ORDER — ATORVASTATIN CALCIUM 80 MG/1
80 TABLET, FILM COATED ORAL DAILY
Qty: 90 TABLET | Refills: 3 | Status: SHIPPED | OUTPATIENT
Start: 2022-12-22

## 2023-02-15 ENCOUNTER — HOSPITAL ENCOUNTER (OUTPATIENT)
Dept: PHARMACY | Age: 78
Setting detail: THERAPIES SERIES
Discharge: HOME | End: 2023-02-15
Payer: MEDICARE

## 2023-02-15 ENCOUNTER — HOSPITAL ENCOUNTER (OUTPATIENT)
Age: 78
Discharge: HOME | End: 2023-02-15
Payer: MEDICARE

## 2023-02-15 VITALS — HEART RATE: 76 BPM | DIASTOLIC BLOOD PRESSURE: 100 MMHG | SYSTOLIC BLOOD PRESSURE: 189 MMHG

## 2023-02-15 DIAGNOSIS — I10 PRIMARY HYPERTENSION: ICD-10-CM

## 2023-02-15 DIAGNOSIS — I48.91 ATRIAL FIBRILLATION, UNSPECIFIED TYPE (HCC): Primary | ICD-10-CM

## 2023-02-15 DIAGNOSIS — E78.5 HYPERLIPIDEMIA, UNSPECIFIED HYPERLIPIDEMIA TYPE: ICD-10-CM

## 2023-02-15 DIAGNOSIS — I48.91 ATRIAL FIBRILLATION, UNSPECIFIED TYPE (HCC): ICD-10-CM

## 2023-02-15 LAB
ABSOLUTE EOS #: 0.1 K/UL (ref 0–0.4)
ABSOLUTE LYMPH #: 1.2 K/UL (ref 1–4.8)
ABSOLUTE MONO #: 0.7 K/UL (ref 0–1)
ALBUMIN SERPL-MCNC: 3.4 G/DL (ref 3.5–5.2)
ALP SERPL-CCNC: 104 U/L (ref 35–104)
ALT SERPL-CCNC: 17 U/L (ref 5–33)
ANION GAP SERPL CALCULATED.3IONS-SCNC: 10 MMOL/L (ref 9–17)
AST SERPL-CCNC: 23 U/L
BASOPHILS # BLD: 0 % (ref 0–2)
BASOPHILS ABSOLUTE: 0 K/UL (ref 0–0.2)
BILIRUB SERPL-MCNC: 0.8 MG/DL (ref 0.3–1.2)
BUN SERPL-MCNC: 12 MG/DL (ref 8–23)
BUN/CREAT BLD: 19 (ref 9–20)
CALCIUM SERPL-MCNC: 9 MG/DL (ref 8.6–10.4)
CHLORIDE SERPL-SCNC: 104 MMOL/L (ref 98–107)
CHOLEST SERPL-MCNC: 151 MG/DL
CHOLESTEROL/HDL RATIO: 2.2
CO2 SERPL-SCNC: 28 MMOL/L (ref 20–31)
CREAT SERPL-MCNC: 0.64 MG/DL (ref 0.5–0.9)
DIFFERENTIAL TYPE: YES
EKG Q-T INTERVAL: 378 MS
EKG QRS DURATION: 118 MS
EKG QTC CALCULATION (BAZETT): 449 MS
EKG R AXIS: 72 DEGREES
EKG T AXIS: 106 DEGREES
EKG VENTRICULAR RATE: 85 BPM
EOSINOPHILS RELATIVE PERCENT: 1 % (ref 0–5)
GFR SERPL CREATININE-BSD FRML MDRD: >60 ML/MIN/1.73M2
GLUCOSE SERPL-MCNC: 111 MG/DL (ref 70–99)
HCT VFR BLD AUTO: 36.2 % (ref 36–46)
HDLC SERPL-MCNC: 69 MG/DL
HGB BLD-MCNC: 11.7 G/DL (ref 12–16)
INR BLD: 3.2
LDLC SERPL CALC-MCNC: 68 MG/DL (ref 0–130)
LYMPHOCYTES # BLD: 16 % (ref 15–40)
MAGNESIUM SERPL-MCNC: 1.8 MG/DL (ref 1.6–2.6)
MCH RBC QN AUTO: 28.1 PG (ref 26–34)
MCHC RBC AUTO-ENTMCNC: 32.5 G/DL (ref 31–37)
MCV RBC AUTO: 86.6 FL (ref 80–100)
MONOCYTES # BLD: 10 % (ref 4–8)
PATIENT FASTING?: YES
PDW BLD-RTO: 16.3 % (ref 12.1–15.2)
PLATELET # BLD AUTO: 222 K/UL (ref 140–450)
POTASSIUM SERPL-SCNC: 4.1 MMOL/L (ref 3.7–5.3)
PROT SERPL-MCNC: 6.5 G/DL (ref 6.4–8.3)
RBC # BLD: 4.18 M/UL (ref 4–5.2)
SEG NEUTROPHILS: 73 % (ref 47–75)
SEGMENTED NEUTROPHILS ABSOLUTE COUNT: 5.3 K/UL (ref 2.5–7)
SODIUM SERPL-SCNC: 142 MMOL/L (ref 135–144)
TRIGL SERPL-MCNC: 70 MG/DL
TSH SERPL-ACNC: 1.18 UIU/ML (ref 0.3–5)
WBC # BLD AUTO: 7.2 K/UL (ref 3.5–11)

## 2023-02-15 PROCEDURE — 84443 ASSAY THYROID STIM HORMONE: CPT

## 2023-02-15 PROCEDURE — 36415 COLL VENOUS BLD VENIPUNCTURE: CPT

## 2023-02-15 PROCEDURE — 85025 COMPLETE CBC W/AUTO DIFF WBC: CPT

## 2023-02-15 PROCEDURE — 80053 COMPREHEN METABOLIC PANEL: CPT

## 2023-02-15 PROCEDURE — 80061 LIPID PANEL: CPT

## 2023-02-15 PROCEDURE — 99211 OFF/OP EST MAY X REQ PHY/QHP: CPT

## 2023-02-15 PROCEDURE — 93010 ELECTROCARDIOGRAM REPORT: CPT | Performed by: INTERNAL MEDICINE

## 2023-02-15 PROCEDURE — 85610 PROTHROMBIN TIME: CPT

## 2023-02-15 PROCEDURE — 83735 ASSAY OF MAGNESIUM: CPT

## 2023-02-15 PROCEDURE — 93005 ELECTROCARDIOGRAM TRACING: CPT

## 2023-02-15 NOTE — PROGRESS NOTES
[place] [address] [place] [address]/[place]  Medication Management  ANTICOAGULATION    Referring Provider: Dr. Nathaniel Posadas INR: 2.0-3.0     TODAY'S INR: 3.2     WARFARIN Dosage: Continue to HOLD dose every other Friday and take 1.25 mg all other days of the week    INR (no units)   Date Value   02/15/2023 3.2   2022 2.9   10/28/2022 3.6   2022 2.7   2022 2.9   2022 2.6   2022 2.5       Medication changes:  None    Notes:    Fingerstick INR drawn per clinic protocol. Patient states no visible blood in urine and no black tarry stool. No falls. Denies any missed or extra doses of warfarin. No change in maintenance medications or in diet. Slightly supratherapeutic INR today but will continue current dosing and will recheck INR in 4 weeks. Patient acknowledges working in consult agreement with pharmacist as referred by his/her physician.                   For Pharmacy Admin Tracking Only    Intervention Detail: Adherence Monitorin  Total # of Interventions Recommended: 1  Total # of Interventions Accepted: 1  Time Spent (min):30    Zeferino Storm PharmD 2/15/2023 10:48 AM

## 2023-02-16 ENCOUNTER — OFFICE VISIT (OUTPATIENT)
Dept: CARDIOLOGY CLINIC | Age: 78
End: 2023-02-16

## 2023-02-16 VITALS
DIASTOLIC BLOOD PRESSURE: 70 MMHG | BODY MASS INDEX: 28.46 KG/M2 | OXYGEN SATURATION: 96 % | WEIGHT: 171 LBS | SYSTOLIC BLOOD PRESSURE: 150 MMHG | HEART RATE: 88 BPM

## 2023-02-16 DIAGNOSIS — R06.02 SOB (SHORTNESS OF BREATH): ICD-10-CM

## 2023-02-16 DIAGNOSIS — I10 PRIMARY HYPERTENSION: ICD-10-CM

## 2023-02-16 DIAGNOSIS — I48.91 ATRIAL FIBRILLATION, UNSPECIFIED TYPE (HCC): Primary | ICD-10-CM

## 2023-02-16 DIAGNOSIS — R09.89 BRUIT: ICD-10-CM

## 2023-02-16 DIAGNOSIS — E78.5 HYPERLIPIDEMIA, UNSPECIFIED HYPERLIPIDEMIA TYPE: ICD-10-CM

## 2023-02-16 NOTE — PROGRESS NOTES
[facility] 6 mth follow up  No chest pain or sob  No hospitalizations or procedures  Since seen. Bp in good range at home. Lives with Iris [place]ulk[place] [address] [place]  [place] in [place] the   Property. Bedside echo done.    Take bp daily different  Times of the day    See in 1 year with   Carotid u/s and echo

## 2023-02-20 NOTE — PROGRESS NOTES
Usama Sterling MD  [facility]  [facility]  [address] [place] [address]  [phone]      2023      Maurizio Gomez MD  [address] [id number]      RE:   Rosalva Victor  :  1945      Dear Dr. Gomez:    CHIEF COMPLAINT:  1.  Chronic atrial fibrillation.  2.  Hypertension, very labile.  3.  On Coumadin for anticoagulation.    HISTORY OF PRESENT ILLNESS:  I had the pleasure of seeing Rosalva Victor in our office on 2023.  She is a pleasant 77-year-old female who in 2022 developed shortness of breath, worse with walking.  She went to [facility] for shortness of breath and an EKG was done where she was found to be in atrial fibrillation.  She was sent to our ER and we placed her on metoprolol 12.5 mg b.i.d. along with Eliquis and I saw her in consult on 2022.    She has a history of TIA on 2021, and carotid ultrasound showed less than 50% bilateral carotid artery disease.  Echocardiogram showed normal LV function.    Her TIA was manifested by loss of function in the right lower arm, which resolved spontaneously.  Because it was a cryptogenic TIA, she was placed on Plavix and aspirin.  She had no further TIAs.    We did a Lexiscan Cardiolite stress test and an echocardiogram.  Her Lexiscan Cardiolite stress test was normal.  Her echocardiogram on 2022, showed normal LV function, EF of 60% with moderate to severely dilated left atrium and right atrium with moderate mitral regurgitation.    I did a cardioversion on 2022, but she reverted back to atrial fibrillation.  Because she was asymptomatic, I did not attempt to place her on medications and cardiovert her again.    She has done well over this past year.  She has had no chest pain or chest discomfort, no unusual shortness of breath.  No hospitalizations or procedures.  Her blood pressure is usually in a fairly good range at home in the 130 to 140  range.    She lives on [place] acres in a trailer with her daughter, Leroy Lucas, living in the main house. She stays active. She has never had a cardiac catheterization or myocardial infarction. CARDIAC RISK FACTORS:  TIA:  Positive. Hypertension:  Positive. Hyperlipidemia:  Positive, with an untreated LDL in the 160 to 170 range. Diabetes:  Negative. Smoking:  Negative. Other Family Members:  Positive. MEDICATIONS AT THIS TIME:  She is on Lipitor 80 mg daily, vitamin D 5000 units daily, Cardura 2 mg b.i.d., Vasotec 20 mg b.i.d., Lasix 20 mg daily, Toprol-XL 25 mg daily, Protonix 40 mg daily, potassium 10 mEq daily, Coumadin as directed. PAST MEDICAL AND SURGICAL HISTORY:  1. She had left footdrop. 2.  Hypertension. 3.  Hyperlipidemia. 4.  GERD. 5.  TIA on 05/18/2021, most likely from occult paroxysmal atrial fibrillation. 6.  Appendectomy. 7.  Bilateral extraction of cataracts. 8.  Closed fracture of the right femur. 9.  Total abdominal hysterectomy. 10.  Tubal ligation. 11.  Atrial fibrillation as stated previously. FAMILY HISTORY:  Significant for CAD. SOCIAL HISTORY:  She is 76years old. Does not smoke or drink alcohol. . She has one daughter by the name of Leroy Lucas, who lives on the property in the house, with Mrs. Grant Abbott living in a trailer on the property. 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 She does not smoke or drink alcohol. REVIEW OF SYSTEMS:  Cardiac as above. Other systems reviewed including constitutional, eyes, ears, nose and throat, cardiovascular, respiratory, GI, , musculoskeletal, integumentary, neurologic, endocrine, hematologic and allergic/immunologic are negative except for what is described above. No weight loss or weight gain. No change in bowel habits. No blood in stools. No fevers, sweats or chills.     PHYSICAL EXAMINATION:  VITAL SIGNS:  Her blood pressure was 179/76 with a heart rate of 88 and irregular. Respirations 18. O2 sat 96%. Weight 171 pounds. GENERAL:  She is a pleasant 58-year-old female. Denied pain. She was oriented to person, place, time. Answered questions appropriately. SKIN:  No unusual skin changes. HEENT:  The pupils are equally round and intact. Mucous membranes were dry. NECK:  No JVD. Good carotid pulses. No carotid bruits. No lymphadenopathy or thyromegaly. CARDIOVASCULAR EXAM:  S1 and S2 were normal.  No S3 or S4. Soft systolic blowing type murmur. No diastolic murmur. PMI was normal.  No lift, thrust, or pericardial friction rub. LUNGS:  Clear to auscultation and percussion. ABDOMEN:  Soft and nontender. Good bowel sounds. EXTREMITIES:  Good femoral pulses. Good pedal pulses. No pedal edema. Skin was warm and dry. No calf tenderness. Nail beds pink. Good cap refill. PULSES:  Bilateral symmetrical radial, brachial and carotid pulses. No carotid bruits. Good femoral and pedal pulses. NEUROLOGIC EXAM:  Within normal limits. PSYCHIATRIC EXAM:  Within normal limits. LABORATORY DATA:  Sodium 142, potassium 4.1, BUN 12, creatinine 0.64, GFR was greater than 60, magnesium 1.8, calcium 9.0. Cholesterol was 151 with an HDL of 69, LDL 68, triglycerides 70. ALT was 17, AST was 23. TSH was 1. 18. Vitamin D not back yet. Her white count was 7.2, hemoglobin 11.7 with a platelet count of 336,683. EKG showed atrial fibrillation with a controlled ventricular response with ST-T changes inferiorly. IMPRESSION:  1. Atrial fibrillation discovered one year ago in the emergency room on 04/29/2022, for shortness of breath. 2.  TIA on 05/18/2021, with no etiology found, which most likely in retrospect was paroxysmal atrial fibrillation. 3.  On Coumadin for anticoagulation. 4.  Hypertension.   5.  Chronic atrial fibrillation now with a cardioversion on 05/23/2022, with her reverting back to atrial fibrillation, and since she was asymptomatic, I left her in atrial fibrillation and rate control. 6.  Hyperlipidemia, well controlled. PLAN:  1. We will do a carotid ultrasound and an echocardiogram in one year. 2.  No change in medications. 3.  She will take her blood pressure daily for two weeks and call us with blood pressure and heart rates. DISCUSSION:  Mrs. Janice Sutton overall is doing excellent. She has had no chest pain or chest discomfort or any unusual shortness of breath. Her rate is well controlled and she is on Coumadin for anticoagulation. Her blood pressure was high here and I will have her take it at home for two weeks at different times of the day and call us with her blood pressure and heart rate. I will plan on seeing her in one year. I will repeat an echocardiogram because of her mitral and tricuspid regurgitation as well as carotid ultrasounds. Thank you very much for allowing me the privilege of seeing Mrs. Janice Sutton. If you have any questions on my thoughts, please do not hesitate to contact me.     Sincerely,        Searcy Siemens, MD    D: 02/16/2023 15:00:30     T: 02/16/2023 15:05:29     [id number]  Job#: [id number]   Doc#: [id number]

## 2023-02-28 ENCOUNTER — TELEPHONE (OUTPATIENT)
Dept: CARDIOLOGY CLINIC | Age: 78
End: 2023-02-28

## 2023-02-28 RX ORDER — TERAZOSIN 2 MG/1
2 CAPSULE ORAL NIGHTLY
Qty: 30 CAPSULE | Refills: 11 | Status: SHIPPED | OUTPATIENT
Start: 2023-02-28

## 2023-02-28 RX ORDER — TERAZOSIN 2 MG/1
2 CAPSULE ORAL NIGHTLY
COMMUNITY
End: 2023-02-28 | Stop reason: SDUPTHER

## 2023-02-28 NOTE — TELEPHONE ENCOUNTER
Pt called stating insurance will no longer cover cardura   They suggested terazosin or prazosin   If Dr does not want to change she will use good rx and pay the 20 dollars for 90 day supple  Per Dr. Mandy Romeo ok to change and try Hytrin (terazosin)2 mg daily when current rx of cardura is done   Lmovm to pt

## 2023-03-15 ENCOUNTER — HOSPITAL ENCOUNTER (OUTPATIENT)
Dept: PHARMACY | Age: 78
Setting detail: THERAPIES SERIES
Discharge: HOME | End: 2023-03-15
Payer: MEDICARE

## 2023-03-15 VITALS — HEART RATE: 69 BPM | DIASTOLIC BLOOD PRESSURE: 89 MMHG | SYSTOLIC BLOOD PRESSURE: 173 MMHG

## 2023-03-15 DIAGNOSIS — I48.91 ATRIAL FIBRILLATION, UNSPECIFIED TYPE (HCC): Primary | ICD-10-CM

## 2023-03-15 LAB — INR BLD: 2.7

## 2023-03-15 PROCEDURE — 99211 OFF/OP EST MAY X REQ PHY/QHP: CPT

## 2023-03-15 PROCEDURE — 85610 PROTHROMBIN TIME: CPT

## 2023-03-15 NOTE — PROGRESS NOTES
[address]/[place]  Medication Management  ANTICOAGULATION    Referring Provider: Dr. Vi Antunez INR: 2.0-3.0     TODAY'S INR: 2.7     WARFARIN Dosage: Continue to HOLD dose every other Friday and take 1.25 mg all other days of the week    INR (no units)   Date Value   03/15/2023 2.7   02/15/2023 3.2   2022 2.9   10/28/2022 3.6   2022 2.7   2022 2.9   2022 2.6       Medication changes:  Doxazosin 2 mg BID changing to Terazosin 2 mg QHS    Notes:    Fingerstick INR drawn per clinic protocol. Patient states no visible blood in urine, no black tarry stool and no falls. Denies any missed or extra doses of warfarin. Doxazosin 2 mg BID changing to Terazosin 2 mg QHS. No change in other maintenance medications or in diet. Will continue current dosing per therapeutic INR and will recheck INR in 6 weeks. Patient acknowledges working in consult agreement with pharmacist as referred by his/her physician.                   For Pharmacy Admin Tracking Only    Intervention Detail: Adherence Monitorin  Total # of Interventions Recommended: 1  Total # of Interventions Accepted: 1  Time Spent (min): 20    Zeb Andrade, PharmD 3/15/2023 10:56 AM

## 2023-04-19 RX ORDER — METOPROLOL SUCCINATE 25 MG/1
25 TABLET, EXTENDED RELEASE ORAL DAILY
Qty: 90 TABLET | Refills: 3 | Status: SHIPPED | OUTPATIENT
Start: 2023-04-19

## 2023-04-26 ENCOUNTER — HOSPITAL ENCOUNTER (OUTPATIENT)
Dept: PHARMACY | Age: 78
Setting detail: THERAPIES SERIES
Discharge: HOME | End: 2023-04-26
Payer: MEDICARE

## 2023-04-26 VITALS
DIASTOLIC BLOOD PRESSURE: 94 MMHG | WEIGHT: 167.8 LBS | HEART RATE: 79 BPM | BODY MASS INDEX: 27.92 KG/M2 | SYSTOLIC BLOOD PRESSURE: 183 MMHG

## 2023-04-26 DIAGNOSIS — I48.91 ATRIAL FIBRILLATION, UNSPECIFIED TYPE (HCC): Primary | ICD-10-CM

## 2023-04-26 LAB — INR BLD: 2.3

## 2023-04-26 PROCEDURE — 85610 PROTHROMBIN TIME: CPT

## 2023-04-26 PROCEDURE — 99211 OFF/OP EST MAY X REQ PHY/QHP: CPT

## 2023-04-26 NOTE — PROGRESS NOTES
[facility] [address] [place] [address]/[place]  Medication Management  ANTICOAGULATION    Referring Provider: Dr. Patrice Shrestha INR: 2.0-3.0     TODAY'S INR: 2.3     WARFARIN Dosage: Continue to HOLD dose every other Friday and take 1.25 mg all other days of the week    INR (no units)   Date Value   2023 2.3   03/15/2023 2.7   02/15/2023 3.2   2022 2.9   10/28/2022 3.6   2022 2.7   2022 2.9       Hemoglobin   Date Value Ref Range Status   02/15/2023 11.7 (L) 12.0 - 16.0 g/dL Final     Hematocrit   Date Value Ref Range Status   02/15/2023 36.2 36 - 46 % Final     ALT   Date Value Ref Range Status   02/15/2023 17 5 - 33 U/L Final     AST   Date Value Ref Range Status   02/15/2023 23 <32 U/L Final       Medication changes:  None    Notes:    Fingerstick INR drawn per clinic protocol. Patient states no visible blood in urine, no black tarry stool, no falls. Denies any missed or extra doses of warfarin. Patient has terazosin but remains on doxazosin as insurance is still covering. No change in other maintenance medications or in diet. INR remains therapeutic at current dose so will continue holding every other Friday with 1.25 mg all other days and will recheck INR in 6 weeks. Patient acknowledges working in consult agreement with pharmacist as referred by his/her physician.     For Pharmacy Admin Tracking Only    Intervention Detail: Adherence Monitorin  Total # of Interventions Recommended: 1  Total # of Interventions Accepted: 1  Time Spent (min): 20    [place] Cohn PharmD 2023 10:50 AM

## 2023-06-07 ENCOUNTER — HOSPITAL ENCOUNTER (OUTPATIENT)
Dept: PHARMACY | Age: 78
Setting detail: THERAPIES SERIES
Discharge: HOME | End: 2023-06-07
Payer: MEDICARE

## 2023-06-07 VITALS
HEART RATE: 69 BPM | DIASTOLIC BLOOD PRESSURE: 95 MMHG | SYSTOLIC BLOOD PRESSURE: 171 MMHG | BODY MASS INDEX: 27.99 KG/M2 | WEIGHT: 168.2 LBS

## 2023-06-07 LAB — INR BLD: 3.1

## 2023-06-07 PROCEDURE — 99211 OFF/OP EST MAY X REQ PHY/QHP: CPT

## 2023-06-07 PROCEDURE — 85610 PROTHROMBIN TIME: CPT

## 2023-06-07 NOTE — PROGRESS NOTES
[address]/[place]  Medication Management  ANTICOAGULATION    Referring Provider: Dr. Ginette Andersen INR: 2.0-3.0     TODAY'S INR: 2.3     WARFARIN Dosage: Continue to HOLD dose every other Friday and take 1.25 mg all other days of the week    INR (no units)   Date Value   2023 3.1   2023 2.3   03/15/2023 2.7   02/15/2023 3.2   2022 2.9   10/28/2022 3.6   2022 2.7       Hemoglobin   Date Value Ref Range Status   02/15/2023 11.7 (L) 12.0 - 16.0 g/dL Final     Hematocrit   Date Value Ref Range Status   02/15/2023 36.2 36 - 46 % Final     ALT   Date Value Ref Range Status   02/15/2023 17 5 - 33 U/L Final     AST   Date Value Ref Range Status   02/15/2023 23 <32 U/L Final       Medication changes:  None    Notes:    Fingerstick INR drawn per clinic protocol. Patient states no visible blood in urine, no black tarry stool, no falls. Denies any missed or extra doses of warfarin. Patient remains on doxazosin as insurance is still covering. No change in other maintenance medications or in diet. INR slightly supratherapeutic but will continue holding every other Friday with 1.25 mg all other days and will recheck INR in 6 weeks. Patient acknowledges working in consult agreement with pharmacist as referred by his/her physician.     For Pharmacy Admin Tracking Only    Intervention Detail: Adherence Monitorin  Total # of Interventions Recommended: 1  Total # of Interventions Accepted: 1  Time Spent (min): 20    Natalie Alejo PharmD 2023 10:31 AM

## 2023-07-19 ENCOUNTER — HOSPITAL ENCOUNTER (OUTPATIENT)
Dept: PHARMACY | Age: 78
Setting detail: THERAPIES SERIES
Discharge: HOME | End: 2023-07-19
Payer: MEDICARE

## 2023-07-19 VITALS
WEIGHT: 155.6 LBS | BODY MASS INDEX: 25.89 KG/M2 | HEART RATE: 72 BPM | DIASTOLIC BLOOD PRESSURE: 89 MMHG | SYSTOLIC BLOOD PRESSURE: 160 MMHG

## 2023-07-19 LAB — INR BLD: 2.3

## 2023-07-19 PROCEDURE — 85610 PROTHROMBIN TIME: CPT

## 2023-07-19 PROCEDURE — 99211 OFF/OP EST MAY X REQ PHY/QHP: CPT

## 2023-07-19 NOTE — PROGRESS NOTES
[facility]  Medication Management  ANTICOAGULATION    Referring Provider: Dr. Rolando Ness INR: 2.0-3.0     TODAY'S INR: 2.3     WARFARIN Dosage: Continue to HOLD dose every other Friday and take 1.25 mg all other days of the week    INR (no units)   Date Value   2023 2.3   2023 3.1   2023 2.3   03/15/2023 2.7   02/15/2023 3.2   2022 2.9   10/28/2022 3.6       Hemoglobin   Date Value Ref Range Status   02/15/2023 11.7 (L) 12.0 - 16.0 g/dL Final     Hematocrit   Date Value Ref Range Status   02/15/2023 36.2 36 - 46 % Final     ALT   Date Value Ref Range Status   02/15/2023 17 5 - 33 U/L Final     AST   Date Value Ref Range Status   02/15/2023 23 <32 U/L Final       Medication changes:  None     Notes:    Fingerstick INR drawn per clinic protocol. Patient states no visible blood in urine, no black tarry stool, no falls. Denies any missed or extra doses of warfarin. No change in medications but does advise that her appetite has been down over the last several weeks which has resulted in significant weight loss. She is weighing herself every day and will continue to monitor. She is feeling great and her BP has been better recently too. INR is therapeutic so will continue current dosing of holding a dose every other Friday and taking 1.25 mg all other days of the week. Will recheck INR in 6 weeks. Patient acknowledges working in consult agreement with pharmacist as referred by his/her physician.     For Pharmacy Admin Tracking Only    Intervention Detail: Adherence Monitorin  Total # of Interventions Recommended: 1  Total # of Interventions Accepted: 1  Time Spent (min): 20    Sathish Blackburn, PharmD 2023 10:27 AM

## 2023-08-30 ENCOUNTER — HOSPITAL ENCOUNTER (OUTPATIENT)
Dept: PHARMACY | Age: 78
Setting detail: THERAPIES SERIES
Discharge: HOME | End: 2023-08-30
Payer: MEDICARE

## 2023-08-30 VITALS
BODY MASS INDEX: 26.06 KG/M2 | HEART RATE: 69 BPM | WEIGHT: 156.6 LBS | DIASTOLIC BLOOD PRESSURE: 104 MMHG | SYSTOLIC BLOOD PRESSURE: 181 MMHG

## 2023-08-30 DIAGNOSIS — I48.91 ATRIAL FIBRILLATION, UNSPECIFIED TYPE (HCC): Primary | ICD-10-CM

## 2023-08-30 LAB — INR BLD: 2.3

## 2023-08-30 PROCEDURE — 85610 PROTHROMBIN TIME: CPT | Performed by: FAMILY MEDICINE

## 2023-08-30 PROCEDURE — 99211 OFF/OP EST MAY X REQ PHY/QHP: CPT | Performed by: FAMILY MEDICINE

## 2023-08-30 NOTE — PROGRESS NOTES
[facility]  Medication Management  ANTICOAGULATION    Referring Provider: Dr Vale Romero INR: 2.0-3.0    TODAY'S INR: 2.3    WARFARIN Dosage: no dose every other Friday and 1.25mg all other days    INR (no units)   Date Value   2023 2.3   2023 2.3   2023 3.1   2023 2.3   03/15/2023 2.7   02/15/2023 3.2   2022 2.9       Hemoglobin   Date Value Ref Range Status   02/15/2023 11.7 (L) 12.0 - 16.0 g/dL Final     Hematocrit   Date Value Ref Range Status   02/15/2023 36.2 36 - 46 % Final     ALT   Date Value Ref Range Status   02/15/2023 17 5 - 33 U/L Final     AST   Date Value Ref Range Status   02/15/2023 23 <32 U/L Final       Medication changes:  No change    Notes:    Fingerstick INR drawn per clinic protocol. Patient states no visible blood in urine and no black tarry stool. Denies any missed doses of warfarin. No change in other maintenance medications or in diet. Will recheck INR in 6 weeks. Patient acknowledges working in consult agreement with pharmacist as referred by his/her physician.                   For Pharmacy Admin Tracking Only    Intervention Detail: Adherence Monitorin  Total # of Interventions Recommended: 2  Total # of Interventions Accepted: 2  Time Spent (min): 20    Venu Carbajal R.Ph., 2023,1:07 PM

## 2023-09-11 RX ORDER — POTASSIUM CHLORIDE 750 MG/1
10 TABLET, FILM COATED, EXTENDED RELEASE ORAL DAILY
Qty: 30 TABLET | Refills: 11 | Status: SHIPPED | OUTPATIENT
Start: 2023-09-11

## 2023-09-11 RX ORDER — DOXAZOSIN 2 MG/1
TABLET ORAL
Qty: 60 TABLET | Refills: 11 | Status: SHIPPED | OUTPATIENT
Start: 2023-09-11

## 2023-09-14 RX ORDER — WARFARIN SODIUM 2.5 MG/1
TABLET ORAL
Qty: 45 TABLET | Refills: 3 | Status: SHIPPED | OUTPATIENT
Start: 2023-09-14

## 2023-10-11 ENCOUNTER — HOSPITAL ENCOUNTER (OUTPATIENT)
Dept: PHARMACY | Age: 78
Setting detail: THERAPIES SERIES
Discharge: HOME | End: 2023-10-11
Payer: MEDICARE

## 2023-10-11 VITALS
HEART RATE: 66 BPM | BODY MASS INDEX: 27.26 KG/M2 | DIASTOLIC BLOOD PRESSURE: 92 MMHG | WEIGHT: 163.8 LBS | SYSTOLIC BLOOD PRESSURE: 177 MMHG

## 2023-10-11 DIAGNOSIS — I48.91 ATRIAL FIBRILLATION, UNSPECIFIED TYPE (HCC): Primary | ICD-10-CM

## 2023-10-11 LAB — INR BLD: 3

## 2023-10-11 PROCEDURE — 85610 PROTHROMBIN TIME: CPT

## 2023-10-11 PROCEDURE — 99211 OFF/OP EST MAY X REQ PHY/QHP: CPT

## 2023-10-11 NOTE — PROGRESS NOTES
[facility]  [facility]ication [facility]  ANTICOAGULATION    Referring Provider: Dr. Eve Miguel INR: 2.0-3.0     TODAY'S INR: 3.0     WARFARIN Dosage: Continue to HOLD dose every other Friday and take 1.25 mg all other days of the week    INR (no units)   Date Value   10/11/2023 3   2023 2.3   2023 2.3   2023 3.1   2023 2.3   03/15/2023 2.7   02/15/2023 3.2       Hemoglobin   Date Value Ref Range Status   02/15/2023 11.7 (L) 12.0 - 16.0 g/dL Final     Hematocrit   Date Value Ref Range Status   02/15/2023 36.2 36 - 46 % Final     ALT   Date Value Ref Range Status   02/15/2023 17 5 - 33 U/L Final     AST   Date Value Ref Range Status   02/15/2023 23 <32 U/L Final       Medication changes:  None     Notes:    Fingerstick INR drawn per clinic protocol. Patient states no visible blood in urine, black tarry stool, falls or ER visits and denies any missed or extra doses of warfarin. No change in other maintenance medications or in diet. INR is therapeutic so will continue holding her dose every other Friday and 1.25 mg all other days and will recheck INR in 6 week(s). Patient acknowledges working in consult agreement with pharmacist as referred by his/her physician.     For Pharmacy Admin Tracking Only    Intervention Detail: Adherence Monitorin  Total # of Interventions Recommended: 1  Total # of Interventions Accepted: 1  Time Spent (min): 20    Rufino James PharmD 10/11/2023 10:11 AM

## 2023-11-21 ENCOUNTER — APPOINTMENT (OUTPATIENT)
Dept: PHARMACY | Age: 78
End: 2023-11-21
Payer: MEDICARE

## 2023-11-28 ENCOUNTER — HOSPITAL ENCOUNTER (OUTPATIENT)
Dept: PHARMACY | Age: 78
Setting detail: THERAPIES SERIES
Discharge: HOME | End: 2023-11-28
Payer: MEDICARE

## 2023-11-28 LAB — INR BLD: 3.5

## 2023-11-28 PROCEDURE — 85610 PROTHROMBIN TIME: CPT

## 2023-11-28 PROCEDURE — 99212 OFFICE O/P EST SF 10 MIN: CPT

## 2023-11-28 RX ORDER — WARFARIN SODIUM 1 MG/1
1 TABLET ORAL SEE ADMIN INSTRUCTIONS
COMMUNITY

## 2023-11-28 NOTE — PROGRESS NOTES
[facility]  [facility]  ANTICOAGULATION    Referring Provider: Dr. Meredith Madden      GOAL INR: 2 - 3     TODAY'S INR: 3.5     WARFARIN Dosage: Hold warfarin today then decrease to new strength of 1 mg tablet daily. INR (no units)   Date Value   10/11/2023 3   2023 2.3   2023 2.3   2023 3.1   2023 2.3   03/15/2023 2.7   02/15/2023 3.2       Hemoglobin   Date Value Ref Range Status   02/15/2023 11.7 (L) 12.0 - 16.0 g/dL Final     Hematocrit   Date Value Ref Range Status   02/15/2023 36.2 36 - 46 % Final     ALT   Date Value Ref Range Status   02/15/2023 17 5 - 33 U/L Final     AST   Date Value Ref Range Status   02/15/2023 23 <32 U/L Final       Medication changes:  none    Notes:    Fingerstick INR drawn per clinic protocol. Patient states no visible blood in urine and no black tarry stool. Denies any missed doses of warfarin. No change in other maintenance medications or in diet. Will recheck INR in 6 weeks per patient request/insistance. Patient acknowledges working in consult agreement with pharmacist as referred by his/her physician. Called new RX into [facility] for NEW warfarin strength of 1 mg tablets take 1 tablet daily or as directed #90 with 3 refills per Dr Jayjay Belle. Cancel warfarin 2.5 mg tablets.                For Pharmacy Admin Tracking Only    Intervention Detail: Adherence Monitorin, Dose Adjustment: 2, reason: Therapy Optimization, and New Rx: 1, reason: Needs Additional Therapy  Total # of Interventions Recommended: 3  Total # of Interventions Accepted: 3  Time Spent (min): [address], [facility], PharmD

## 2024-01-09 ENCOUNTER — APPOINTMENT (OUTPATIENT)
Dept: PHARMACY | Age: 79
End: 2024-01-09
Payer: MEDICARE

## 2024-01-31 ENCOUNTER — HOSPITAL ENCOUNTER (OUTPATIENT)
Age: 79
Discharge: HOME | End: 2024-01-31
Payer: MEDICARE

## 2024-01-31 ENCOUNTER — HOSPITAL ENCOUNTER (OUTPATIENT)
Dept: PHARMACY | Age: 79
Setting detail: THERAPIES SERIES
Discharge: HOME | End: 2024-01-31
Payer: MEDICARE

## 2024-01-31 ENCOUNTER — HOSPITAL ENCOUNTER (OUTPATIENT)
Age: 79
Discharge: HOME | End: 2024-02-02
Payer: MEDICARE

## 2024-01-31 ENCOUNTER — HOSPITAL ENCOUNTER (OUTPATIENT)
Dept: GENERAL RADIOLOGY | Age: 79
Discharge: HOME | End: 2024-02-02
Payer: MEDICARE

## 2024-01-31 VITALS — BODY MASS INDEX: 26.76 KG/M2 | WEIGHT: 160.8 LBS

## 2024-01-31 DIAGNOSIS — I48.91 ATRIAL FIBRILLATION, UNSPECIFIED TYPE (HCC): Primary | ICD-10-CM

## 2024-01-31 DIAGNOSIS — E78.5 HYPERLIPIDEMIA, UNSPECIFIED HYPERLIPIDEMIA TYPE: ICD-10-CM

## 2024-01-31 DIAGNOSIS — I48.91 ATRIAL FIBRILLATION, UNSPECIFIED TYPE (HCC): ICD-10-CM

## 2024-01-31 DIAGNOSIS — I10 PRIMARY HYPERTENSION: ICD-10-CM

## 2024-01-31 LAB
ALBUMIN SERPL-MCNC: 3.2 G/DL (ref 3.5–5.2)
ALP SERPL-CCNC: 124 U/L (ref 35–104)
ALT SERPL-CCNC: 15 U/L (ref 5–33)
ANION GAP SERPL CALCULATED.3IONS-SCNC: 9 MMOL/L (ref 9–17)
AST SERPL-CCNC: 26 U/L
BASOPHILS # BLD: 0.02 K/UL (ref 0–0.2)
BASOPHILS NFR BLD: 0 % (ref 0–2)
BILIRUB SERPL-MCNC: 0.8 MG/DL (ref 0.3–1.2)
BUN SERPL-MCNC: 10 MG/DL (ref 8–23)
BUN/CREAT SERPL: 13 (ref 9–20)
CALCIUM SERPL-MCNC: 8.8 MG/DL (ref 8.6–10.4)
CHLORIDE SERPL-SCNC: 103 MMOL/L (ref 98–107)
CHOLEST SERPL-MCNC: 151 MG/DL
CHOLESTEROL/HDL RATIO: 2.6
CO2 SERPL-SCNC: 26 MMOL/L (ref 20–31)
CREAT SERPL-MCNC: 0.8 MG/DL (ref 0.5–0.9)
EOSINOPHIL # BLD: 0.07 K/UL (ref 0–0.4)
EOSINOPHILS RELATIVE PERCENT: 1 % (ref 0–5)
ERYTHROCYTE [DISTWIDTH] IN BLOOD BY AUTOMATED COUNT: 15 % (ref 12.1–15.2)
GFR SERPL CREATININE-BSD FRML MDRD: >60 ML/MIN/1.73M2
GLUCOSE SERPL-MCNC: 99 MG/DL (ref 70–99)
HCT VFR BLD AUTO: 41.6 % (ref 36–46)
HDLC SERPL-MCNC: 59 MG/DL
HGB BLD-MCNC: 13 G/DL (ref 12–16)
IMM GRANULOCYTES # BLD AUTO: 0.01 K/UL (ref 0–0.3)
IMM GRANULOCYTES NFR BLD: 0 % (ref 0–5)
INR BLD: 1.8
LDLC SERPL CALC-MCNC: 78 MG/DL (ref 0–130)
LYMPHOCYTES NFR BLD: 1.19 K/UL (ref 1–4.8)
LYMPHOCYTES RELATIVE PERCENT: 21 % (ref 15–40)
MAGNESIUM SERPL-MCNC: 1.6 MG/DL (ref 1.6–2.6)
MCH RBC QN AUTO: 27.7 PG (ref 26–34)
MCHC RBC AUTO-ENTMCNC: 31.3 G/DL (ref 31–37)
MCV RBC AUTO: 88.7 FL (ref 80–100)
MONOCYTES NFR BLD: 0.52 K/UL (ref 0–1)
MONOCYTES NFR BLD: 9 % (ref 4–8)
NEUTROPHILS NFR BLD: 69 % (ref 47–75)
NEUTS SEG NFR BLD: 4 K/UL (ref 2.5–7)
PLATELET # BLD AUTO: 194 K/UL (ref 140–450)
PMV BLD AUTO: 10 FL (ref 6–12)
POTASSIUM SERPL-SCNC: 4.2 MMOL/L (ref 3.7–5.3)
PROT SERPL-MCNC: 6.7 G/DL (ref 6.4–8.3)
RBC # BLD AUTO: 4.69 M/UL (ref 4–5.2)
SODIUM SERPL-SCNC: 138 MMOL/L (ref 135–144)
TRIGL SERPL-MCNC: 72 MG/DL
TSH SERPL DL<=0.05 MIU/L-ACNC: 1.8 UIU/ML (ref 0.3–5)
WBC OTHER # BLD: 5.8 K/UL (ref 3.5–11)

## 2024-01-31 PROCEDURE — 83735 ASSAY OF MAGNESIUM: CPT

## 2024-01-31 PROCEDURE — 99211 OFF/OP EST MAY X REQ PHY/QHP: CPT

## 2024-01-31 PROCEDURE — 93005 ELECTROCARDIOGRAM TRACING: CPT

## 2024-01-31 PROCEDURE — 85610 PROTHROMBIN TIME: CPT

## 2024-01-31 PROCEDURE — 84443 ASSAY THYROID STIM HORMONE: CPT

## 2024-01-31 PROCEDURE — 80053 COMPREHEN METABOLIC PANEL: CPT

## 2024-01-31 PROCEDURE — 36415 COLL VENOUS BLD VENIPUNCTURE: CPT

## 2024-01-31 PROCEDURE — 71046 X-RAY EXAM CHEST 2 VIEWS: CPT

## 2024-01-31 PROCEDURE — 85025 COMPLETE CBC W/AUTO DIFF WBC: CPT

## 2024-01-31 PROCEDURE — 80061 LIPID PANEL: CPT

## 2024-01-31 NOTE — PROGRESS NOTES
[facility]  Medication Management  ANTICOAGULATION    Referring Provider: Dr. Usama Sterling      GOAL INR: 2.0-3.0     TODAY'S INR: 1.8     WARFARIN Dosage: 2 mg x 1, then increase dose to 1.5 mg W, 1 mg all other days    INR (no units)   Date Value   2024 1.8   2023 3.5   10/11/2023 3   2023 2.3   2023 2.3   2023 3.1   2023 2.3       Hemoglobin   Date Value Ref Range Status   2024 13.0 12.0 - 16.0 g/dL Final     Hematocrit   Date Value Ref Range Status   2024 41.6 36.0 - 46.0 % Final     ALT   Date Value Ref Range Status   2024 15 5 - 33 U/L Final     AST   Date Value Ref Range Status   2024 26 <32 U/L Final       Medication changes:  None     Notes:    Fingerstick INR drawn per clinic protocol. Patient states no visible blood in urine, black tarry stool, falls or ER visits and denies any missed or extra doses of warfarin. No change in other maintenance medications or in diet. INR is subtherapeutic after dose decrease last visit so will order 2 mg this evening before increasing dose to 1.5 mg W, 1 mg all other days and will recheck INR in 4 weeks. Patient acknowledges working in consult agreement with pharmacist as referred by his/her physician.    For Pharmacy Admin Tracking Only    Intervention Detail: Adherence Monitorin and Dose Adjustment: 1, reason: Therapy Optimization  Total # of Interventions Recommended: 2  Total # of Interventions Accepted: 2  Time Spent (min): 20    Jl Hein, PharmD 2024 1:28 PM

## 2024-01-31 NOTE — PATIENT INSTRUCTIONS
Increase current dose of warfarin as instructed on dosing calendar provided.   Continue to monitor urine and stool for signs and symptoms of bleeding.   Please notify the clinic of any medication changes.   Please remember to bring all medications (both prescription and OTC) to your next visit. Kindly notify the clinic if you are unable to make to your next appointment.

## 2024-02-01 LAB
EKG Q-T INTERVAL: 366 MS
EKG QRS DURATION: 124 MS
EKG QTC CALCULATION (BAZETT): 464 MS
EKG R AXIS: 96 DEGREES
EKG T AXIS: 72 DEGREES
EKG VENTRICULAR RATE: 97 BPM

## 2024-02-06 ENCOUNTER — HOSPITAL ENCOUNTER (OUTPATIENT)
Age: 79
Discharge: HOME | End: 2024-02-08
Attending: INTERNAL MEDICINE
Payer: MEDICARE

## 2024-02-06 ENCOUNTER — HOSPITAL ENCOUNTER (OUTPATIENT)
Dept: VASCULAR LAB | Age: 79
Discharge: HOME | End: 2024-02-08
Attending: INTERNAL MEDICINE
Payer: MEDICARE

## 2024-02-06 VITALS — BODY MASS INDEX: 25.71 KG/M2 | HEIGHT: 66 IN | WEIGHT: 160 LBS

## 2024-02-06 DIAGNOSIS — R09.89 BRUIT: ICD-10-CM

## 2024-02-06 DIAGNOSIS — R06.02 SOB (SHORTNESS OF BREATH): ICD-10-CM

## 2024-02-06 PROCEDURE — 93880 EXTRACRANIAL BILAT STUDY: CPT

## 2024-02-06 PROCEDURE — 93306 TTE W/DOPPLER COMPLETE: CPT

## 2024-02-08 ENCOUNTER — OFFICE VISIT (OUTPATIENT)
Dept: CARDIOLOGY CLINIC | Age: 79
End: 2024-02-08
Payer: MEDICARE

## 2024-02-08 VITALS
HEART RATE: 81 BPM | SYSTOLIC BLOOD PRESSURE: 160 MMHG | BODY MASS INDEX: 25.99 KG/M2 | OXYGEN SATURATION: 96 % | WEIGHT: 161 LBS | DIASTOLIC BLOOD PRESSURE: 80 MMHG

## 2024-02-08 DIAGNOSIS — I48.91 ATRIAL FIBRILLATION, UNSPECIFIED TYPE (HCC): ICD-10-CM

## 2024-02-08 DIAGNOSIS — I10 PRIMARY HYPERTENSION: ICD-10-CM

## 2024-02-08 DIAGNOSIS — E78.5 HYPERLIPIDEMIA, UNSPECIFIED HYPERLIPIDEMIA TYPE: ICD-10-CM

## 2024-02-08 DIAGNOSIS — E55.9 VITAMIN D DEFICIENCY DISEASE: ICD-10-CM

## 2024-02-08 DIAGNOSIS — R06.02 SOB (SHORTNESS OF BREATH): Primary | ICD-10-CM

## 2024-02-08 LAB
ECHO AO ASC DIAM: 2.7 CM
ECHO AO ASCENDING AORTA INDEX: 1.48 CM/M2
ECHO AO ROOT DIAM: 3.1 CM
ECHO AO ROOT INDEX: 1.7 CM/M2
ECHO AR MAX VEL PISA: 4.2 M/S
ECHO AV AREA PEAK VELOCITY: 1.1 CM2
ECHO AV AREA/BSA PEAK VELOCITY: 0.6 CM2/M2
ECHO AV CUSP MM: 1.3 CM
ECHO AV PEAK GRADIENT: 8 MMHG
ECHO AV PEAK VELOCITY: 1.4 M/S
ECHO AV REGURGITANT PHT: 1149.1 MILLISECOND
ECHO AV VELOCITY RATIO: 0.29
ECHO BSA: 1.84 M2
ECHO EST RA PRESSURE: 5 MMHG
ECHO LA DIAMETER INDEX: 2.42 CM/M2
ECHO LA DIAMETER: 4.4 CM
ECHO LA TO AORTIC ROOT RATIO: 1.42
ECHO LV E' LATERAL VELOCITY: 9 CM/S
ECHO LV EF PHYSICIAN: 60 %
ECHO LV INTERNAL DIMENSION DIASTOLIC MMODE: 5.3 CM (ref 3.9–5.3)
ECHO LV INTERNAL DIMENSION SYSTOLIC MMODE: 4.2 CM
ECHO LV IVSD MMODE: 1 CM (ref 0.6–0.9)
ECHO LV IVSS MMODE: 1.1 CM
ECHO LV POSTERIOR WALL DIASTOLIC MMODE: 1 CM (ref 0.6–0.9)
ECHO LV POSTERIOR WALL SYSTOLIC MMODE: 1.2 CM
ECHO LVOT AREA: 3.8 CM2
ECHO LVOT DIAM: 2.2 CM
ECHO LVOT MEAN GRADIENT: 0 MMHG
ECHO LVOT PEAK GRADIENT: 1 MMHG
ECHO LVOT PEAK VELOCITY: 0.4 M/S
ECHO LVOT STROKE VOLUME INDEX: 15 ML/M2
ECHO LVOT SV: 27.4 ML
ECHO LVOT VTI: 7.2 CM
ECHO MV A VELOCITY: 0.21 M/S
ECHO MV AREA PHT: 4.9 CM2
ECHO MV E DECELERATION TIME (DT): 154.2 MS
ECHO MV E VELOCITY: 0.96 M/S
ECHO MV E/A RATIO: 4.57
ECHO MV E/E' LATERAL: 10.67
ECHO MV PRESSURE HALF TIME (PHT): 44.7 MS
ECHO PV MAX VELOCITY: 0.7 M/S
ECHO PV PEAK GRADIENT: 2 MMHG
ECHO RIGHT VENTRICULAR SYSTOLIC PRESSURE (RVSP): 49 MMHG
ECHO TV REGURGITANT MAX VELOCITY: 3.31 M/S
ECHO TV REGURGITANT PEAK GRADIENT: 45 MMHG

## 2024-02-08 PROCEDURE — 3079F DIAST BP 80-89 MM HG: CPT | Performed by: INTERNAL MEDICINE

## 2024-02-08 PROCEDURE — 99214 OFFICE O/P EST MOD 30 MIN: CPT | Performed by: INTERNAL MEDICINE

## 2024-02-08 PROCEDURE — 3077F SYST BP >= 140 MM HG: CPT | Performed by: INTERNAL MEDICINE

## 2024-02-08 PROCEDURE — 1123F ACP DISCUSS/DSCN MKR DOCD: CPT | Performed by: INTERNAL MEDICINE

## 2024-02-08 RX ORDER — DOXAZOSIN 2 MG/1
TABLET ORAL
COMMUNITY
End: 2024-02-08 | Stop reason: SDUPTHER

## 2024-02-08 RX ORDER — DOXAZOSIN 2 MG/1
TABLET ORAL
Qty: 270 TABLET | Refills: 3 | Status: SHIPPED | OUTPATIENT
Start: 2024-02-08

## 2024-02-08 NOTE — PATIENT INSTRUCTIONS
Will increase Cardura 2 mg to 2 in am and 1 in pm     Monitor BP/HR daily - different times of the day -  Report readings back to us in 2 weeks    Follow up in one year with routine testing and echo

## 2024-02-08 NOTE — PROGRESS NOTES
[facility] Cardiology  [address] [place], [place] [address]  Phone: [phone], Fax: [phone]       Patient: Rosalva Victor  : 1945  Date of Visit: 2024    REASON FOR VISIT / CONSULTATION: Chronic atrial fibrillation and hypertension - 1 Year Follow-up    History of Present Illness:        Derek Miramontes MD    We had the pleasure of seeing Rosalva Victor today. Ms. Victor is a 78 y.o. female who in 2022 developed shortness of breath, worse with walking.  She went to [facility] for shortness of breath and an EKG was done where she was found to be in atrial fibrillation.  She was sent to our ER and we placed her on metoprolol 12.5 mg b.i.d. along with Eliquis and Dr. Sterling saw her in consult on 2022.     She has a history of TIA on 2021, and carotid ultrasound showed less than 50% bilateral carotid artery disease.  Echocardiogram showed normal LV function.     Her TIA was manifested by loss of function in the right lower arm, which resolved spontaneously.  Because it was a cryptogenic TIA, she was placed on Plavix and aspirin.  She had no further TIAs.     We did a Lexiscan Cardiolite stress test and an echocardiogram.  Her Lexiscan Cardiolite stress test was normal.  Her echocardiogram on 2022, showed normal LV function, EF of 60% with moderate to severely dilated left atrium and right atrium with moderate mitral regurgitation.     Dr. Sterling did a cardioversion on 2022, but she reverted back to atrial fibrillation.  Because she was asymptomatic, he did not attempt to place her on medications and cardiovert her again.    On 2024, we repeated echocardiogram and showed normal LV function with EF 60% with moderate to severe mitral and tricuspid regurgitation.  Mild aortic regurgitation.  Moderate to severe pulmonary hypertension with RVSP 49.  Left atrium is severely dilated and right atrium is moderate to severely dilated.     She

## 2024-02-08 NOTE — PROGRESS NOTES
Ov Dr. Sterling for one year follow up   Checks bp at home-says it runs ok  150 range at home   No hospitalizations/procedures/er visits  No chest pain   No palpitations   No sob   No issues per pt       Will increase Cardura 2 mg to 2 in am and 1 in pm     Monitor BP/HR daily - different times of the day -  Report readings back to us in 2 weeks    Follow up in one year with routine testing and echo

## 2024-02-12 ENCOUNTER — TELEPHONE (OUTPATIENT)
Dept: PHARMACY | Age: 79
End: 2024-02-12

## 2024-02-12 NOTE — TELEPHONE ENCOUNTER
Rosalva left a voicemail on 2/9 at 4:27 p.m. to say that her coumadin dosage has been changed and she thinks she will run out of her warfarin pills before her upcoming appointment on the 28th. She states in the message that her pharmacy is [facility] in [place]

## 2024-03-01 ENCOUNTER — HOSPITAL ENCOUNTER (OUTPATIENT)
Dept: PHARMACY | Age: 79
Setting detail: THERAPIES SERIES
Discharge: HOME | End: 2024-03-01
Payer: MEDICARE

## 2024-03-01 VITALS
BODY MASS INDEX: 26.15 KG/M2 | HEART RATE: 70 BPM | WEIGHT: 162 LBS | SYSTOLIC BLOOD PRESSURE: 181 MMHG | DIASTOLIC BLOOD PRESSURE: 90 MMHG

## 2024-03-01 LAB — INR BLD: 2.2

## 2024-03-01 PROCEDURE — 85610 PROTHROMBIN TIME: CPT

## 2024-03-01 PROCEDURE — 99211 OFF/OP EST MAY X REQ PHY/QHP: CPT

## 2024-03-01 NOTE — PROGRESS NOTES
[facility]ication [facility]  ANTICOAGULATION    Referring Provider: Dr. Usama Sterling      GOAL INR: 2.0-3.0     TODAY'S INR: 2.2     WARFARIN Dosage: Continue 1.5 mg W, 1 mg all other days    INR (no units)   Date Value   2024 2.2   2024 1.8   2023 3.5   10/11/2023 3   2023 2.3   2023 2.3   2023 3.1       Hemoglobin   Date Value Ref Range Status   2024 13.0 12.0 - 16.0 g/dL Final     Hematocrit   Date Value Ref Range Status   2024 41.6 36.0 - 46.0 % Final     ALT   Date Value Ref Range Status   2024 15 5 - 33 U/L Final     AST   Date Value Ref Range Status   2024 26 <32 U/L Final       Medication changes:  Doxazosin decreased from 4 mg QAM, 2 mg QPM to just 4 mg QAM    Notes:    Fingerstick INR drawn per clinic protocol. Patient states no visible blood in urine, black tarry stool, falls or ER visits and denies any missed or extra doses of warfarin. Informs doxazosin was decreased from 4 mg every morning, 2 mg every evening to just 4 mg every morning. No other changes in maintenance medications or in diet. INR is therapeutic so will continue 1.5 mg W, 1 mg all other days and will recheck INR in 6 weeks. Patient acknowledges working in consult agreement with pharmacist as referred by his/her physician.    For Pharmacy Admin Tracking Only    Intervention Detail: Adherence Monitorin  Total # of Interventions Recommended: 1  Total # of Interventions Accepted: 1  Time Spent (min): 20    Jl Hein, PharmD 3/1/2024 10:29 AM

## 2024-03-02 NOTE — PATIENT INSTRUCTIONS
Continue current dose of warfarin as instructed on dosing calendar provided. Continue to monitor urine and stool for signs and symptoms of bleeding. Please notify the clinic of any medication changes. Please remember to bring all medications (both prescription and OTC) to your next visit. Kindly notify the clinic if you are unable to make to your next appointment. show

## 2024-03-07 ENCOUNTER — TELEPHONE (OUTPATIENT)
Dept: CARDIOLOGY CLINIC | Age: 79
End: 2024-03-07

## 2024-03-07 NOTE — TELEPHONE ENCOUNTER
Called Rosalva regarding her BP log dated 2/09/24 to 2/26/24. She states that she is taking her Cardura, total of 4 mg in AM, 2 of the 2mg tablets and stopped taking 2 mg at night. Advised would like her to restart Cardura 2 mg in PM and continue 4 mg in AM.  Advised to take BP daily for next 2 weeks and call with readings.  Rosalva states that her blood pressure readings are always high.      2/09/24   159/98  Evening  2/10/24   156/91  Noon  2/11/24    141/90  Morning  2/12/24    142/85  Noon  2/13/24    131/75  Morning  2/14/24    147/88  Evening  2/15/24    145/88  Noon  2/16/24    140/81  Morning  2/17/24    144/85  Noon  2/18/24    144/82  Evening  2/19/24    139/80  Morning  2/20/24    142/88  Evening  2/21/24    149/87  Morning  2/22/24    138/76  Noon  2/23/24    156/91  Evening  2/24/24    158/81  Noon  2/25/24    147/84  Morning  2/26/24    137/94  Noon

## 2024-03-21 ENCOUNTER — TELEPHONE (OUTPATIENT)
Dept: CARDIOLOGY CLINIC | Age: 79
End: 2024-03-21

## 2024-03-21 DIAGNOSIS — I10 PRIMARY HYPERTENSION: Primary | ICD-10-CM

## 2024-03-21 RX ORDER — DOXAZOSIN 2 MG/1
TABLET ORAL
Qty: 270 TABLET | Refills: 3 | Status: SHIPPED | OUTPATIENT
Start: 2024-03-21

## 2024-03-21 NOTE — TELEPHONE ENCOUNTER
Notified that I reviewed her BP and HR log and looked good. Advised that we will continue the Cardura 4 mg in the AM and 2 mg in the PM. New prescription sent to [facility].

## 2024-04-01 ENCOUNTER — TELEPHONE (OUTPATIENT)
Dept: CARDIOLOGY CLINIC | Age: 79
End: 2024-04-01

## 2024-04-01 RX ORDER — DOXAZOSIN 2 MG/1
4 TABLET ORAL
COMMUNITY

## 2024-04-01 NOTE — TELEPHONE ENCOUNTER
Kvng call stating she is confused about her Cardura-  Her PCP Maurizio Gomez called her few days ago   And told her to take Cardura 4 mg in the am   And none in the pm. She was told \"he does not   Want you taking any pm dose\"  Advised kvng to go ahead and do with PCP wants   But to monitor bp readings and let them know   So changes can be made if needed

## 2024-04-10 RX ORDER — DOXAZOSIN MESYLATE 4 MG/1
TABLET ORAL
Qty: 90 TABLET | Refills: 3 | Status: SHIPPED | OUTPATIENT
Start: 2024-04-10

## 2024-04-16 ENCOUNTER — HOSPITAL ENCOUNTER (OUTPATIENT)
Dept: PHARMACY | Age: 79
Setting detail: THERAPIES SERIES
Discharge: HOME | End: 2024-04-16
Payer: MEDICARE

## 2024-04-16 VITALS
SYSTOLIC BLOOD PRESSURE: 177 MMHG | DIASTOLIC BLOOD PRESSURE: 86 MMHG | WEIGHT: 156 LBS | BODY MASS INDEX: 25.18 KG/M2 | HEART RATE: 71 BPM

## 2024-04-16 DIAGNOSIS — I48.91 ATRIAL FIBRILLATION, UNSPECIFIED TYPE (HCC): Primary | ICD-10-CM

## 2024-04-16 LAB — INR BLD: 2

## 2024-04-16 PROCEDURE — 99211 OFF/OP EST MAY X REQ PHY/QHP: CPT

## 2024-04-16 PROCEDURE — 85610 PROTHROMBIN TIME: CPT

## 2024-04-16 RX ORDER — WARFARIN SODIUM 1 MG/1
TABLET ORAL
Qty: 100 TABLET | Refills: 3 | OUTPATIENT
Start: 2024-04-16

## 2024-04-16 NOTE — PROGRESS NOTES
[facility]  [facility]ication [facility]  ANTICOAGULATION    Referring Provider: Dr. Usama Sterling      GOAL INR: 2.0 - 3.0     TODAY'S INR: 2.0     WARFARIN Dosage: Continue 1.5 mg  and 1 mg all other days of the week       INR (no units)   Date Value   2024 2.0   2024 2.2   2024 1.8   2023 3.5   10/11/2023 3   2023 2.3   2023 2.3   2023 3.1       Hemoglobin   Date Value Ref Range Status   2024 13.0 12.0 - 16.0 g/dL Final     Hematocrit   Date Value Ref Range Status   2024 41.6 36.0 - 46.0 % Final     ALT   Date Value Ref Range Status   2024 15 5 - 33 U/L Final     AST   Date Value Ref Range Status   2024 26 <32 U/L Final       Medication changes:  None      Notes:    Fingerstick INR drawn per clinic protocol. Patient states no visible blood in urine and no black tarry stool. Denies any missed doses of warfarin. No change in other maintenance medications or in diet. Will continue current weekly warfarin regimen and then recheck INR in 6 weeks. Patient acknowledges working in consult agreement with pharmacist as referred by his/her physician.                  For Pharmacy Admin Tracking Only    Intervention Detail: Adherence Monitorin  Total # of Interventions Recommended: 1  Total # of Interventions Accepted: 1  Time Spent (min): 45      Juan Barajas RPH, PharmD

## 2024-05-19 ENCOUNTER — APPOINTMENT (OUTPATIENT)
Dept: GENERAL RADIOLOGY | Age: 79
End: 2024-05-19
Payer: MEDICARE

## 2024-05-19 ENCOUNTER — HOSPITAL ENCOUNTER (EMERGENCY)
Age: 79
Discharge: HOME | End: 2024-05-19
Attending: EMERGENCY MEDICINE
Payer: MEDICARE

## 2024-05-19 VITALS
OXYGEN SATURATION: 94 % | TEMPERATURE: 98.6 F | DIASTOLIC BLOOD PRESSURE: 89 MMHG | HEART RATE: 88 BPM | SYSTOLIC BLOOD PRESSURE: 101 MMHG | RESPIRATION RATE: 18 BRPM | HEIGHT: 66 IN | BODY MASS INDEX: 25.71 KG/M2 | WEIGHT: 160 LBS

## 2024-05-19 DIAGNOSIS — M17.12 PRIMARY OSTEOARTHRITIS OF LEFT KNEE: Primary | ICD-10-CM

## 2024-05-19 PROCEDURE — 99283 EMERGENCY DEPT VISIT LOW MDM: CPT

## 2024-05-19 PROCEDURE — 73564 X-RAY EXAM KNEE 4 OR MORE: CPT

## 2024-05-19 ASSESSMENT — PAIN DESCRIPTION - LOCATION: LOCATION: KNEE

## 2024-05-19 ASSESSMENT — LIFESTYLE VARIABLES
HOW OFTEN DO YOU HAVE A DRINK CONTAINING ALCOHOL: NEVER
HOW MANY STANDARD DRINKS CONTAINING ALCOHOL DO YOU HAVE ON A TYPICAL DAY: PATIENT DOES NOT DRINK

## 2024-05-19 ASSESSMENT — PAIN SCALES - GENERAL: PAINLEVEL_OUTOF10: 2

## 2024-05-19 ASSESSMENT — PAIN DESCRIPTION - PAIN TYPE: TYPE: CHRONIC PAIN

## 2024-05-19 ASSESSMENT — PAIN DESCRIPTION - ORIENTATION: ORIENTATION: LEFT

## 2024-05-19 ASSESSMENT — PAIN DESCRIPTION - DESCRIPTORS: DESCRIPTORS: BURNING;SORE

## 2024-05-19 ASSESSMENT — PAIN - FUNCTIONAL ASSESSMENT: PAIN_FUNCTIONAL_ASSESSMENT: 0-10

## 2024-05-19 NOTE — ED PROVIDER NOTES
[facility] ED  eMERGENCY dEPARTMENT eNCOUnter      Pt Name: Rosalva Victor  MRN: [id number]  Birthdate 1945  Date of evaluation: 5/19/2024  Provider: Reagan Blair MD    CHIEF COMPLAINT       Chief Complaint   Patient presents with    Knee Pain     Patient states past few days left knee pain has become intolerable, home pain meds are not helping.     Patient is an 78-year-old female who presents to the emergency department complaining of left knee pain.  Patient states is gotten worse over the past 2 days it hurts when she ambulates.  She states when she sitting is fine but when she puts pressure on it she has pain.  She has a chronic history of pain in that knee and has dropfoot and wears a brace but states that this is new.  She denies any fever or chills. She denies any redness or warmth        Nursing Notes were reviewed.    REVIEW OF SYSTEMS    (2-9 systems for level 4, 10 or more for level 5)     Review of Systems   Constitutional:  Negative for chills and fever.   Respiratory:  Negative for choking.    Skin:  Negative for color change and rash.   Neurological:  Negative for weakness and numbness.       Except as noted above the remainder of the review of systems was reviewed and negative.       PAST MEDICAL HISTORY     Past Medical History:   Diagnosis Date    Dropfoot     left foot    Foot drop     left     Hypertension          SURGICAL HISTORY       Past Surgical History:   Procedure Laterality Date    ANKLE SURGERY      right    APPENDECTOMY      CATARACT REMOVAL      FRACTURE SURGERY      Pt has a plate and 8 screws in rt ankle    HIP FRACTURE SURGERY Right 10/12/2018    Intramedullary Jordan placement [facility] [employer]    HYSTERECTOMY (CERVIX STATUS UNKNOWN)      NE OFFICE/OUTPT VISIT,PROCEDURE ONLY Right 10/12/2018    RIGHT HIP INTRAMEDULLAR JORDAN - [facility] [employer] CM Nail performed by Darrel Hayes DO at [facility] OR    UPPER GASTROINTESTINAL ENDOSCOPY N/A 6/4/2021    EGD BIOPSY performed by Ike MONZON

## 2024-06-05 ENCOUNTER — HOSPITAL ENCOUNTER (OUTPATIENT)
Dept: PHARMACY | Age: 79
Setting detail: THERAPIES SERIES
Discharge: HOME | End: 2024-06-05
Payer: MEDICARE

## 2024-06-05 VITALS
SYSTOLIC BLOOD PRESSURE: 165 MMHG | WEIGHT: 152 LBS | BODY MASS INDEX: 24.53 KG/M2 | HEART RATE: 85 BPM | DIASTOLIC BLOOD PRESSURE: 104 MMHG

## 2024-06-05 LAB — INR BLD: 1.8

## 2024-06-05 PROCEDURE — 85610 PROTHROMBIN TIME: CPT

## 2024-06-05 PROCEDURE — 99211 OFF/OP EST MAY X REQ PHY/QHP: CPT

## 2024-06-05 NOTE — PROGRESS NOTES
[facility]  Medication Management  ANTICOAGULATION    Referring Provider: Dr. Usama Sterling      GOAL INR: 2.0-3.0     TODAY'S INR: 1.8     WARFARIN Dosage: 2 mg x 1, then resume 1.5 mg W, 1 mg all other days of the week     INR (no units)   Date Value   2024 1.8   2024 2.0   2024 2.2   2024 1.8   2023 3.5   10/11/2023 3   2023 2.3       Hemoglobin   Date Value Ref Range Status   2024 13.0 12.0 - 16.0 g/dL Final     Hematocrit   Date Value Ref Range Status   2024 41.6 36.0 - 46.0 % Final     ALT   Date Value Ref Range Status   2024 15 5 - 33 U/L Final     AST   Date Value Ref Range Status   2024 26 <32 U/L Final       Medication changes:  None     Notes:    Fingerstick INR drawn per clinic protocol. Patient states no visible blood in urine, black tarry stool or falls. She did have an ED visit on  for knee pain. She was diagnosed with advanced arthritis and on , Dr KARLI Gomez did a joint steroid injection which has significantly helped. Denies any missed or extra doses of warfarin. No change in other maintenance medications or in diet. INR is slightly subtherapeutic so will boost with a 2 mg dose today before resuming 1.5 mg W, 1 mg all other days and will recheck INR in 4 weeks. Patient acknowledges working in consult agreement with pharmacist as referred by his/her physician.    For Pharmacy Admin Tracking Only    Intervention Detail: Adherence Monitorin and Dose Adjustment: 1, reason: Therapy Optimization  Total # of Interventions Recommended: 2  Total # of Interventions Accepted: 2  Time Spent (min): 20    Jl Hein, PharmD 2024 2:22 PM

## 2024-07-03 ENCOUNTER — HOSPITAL ENCOUNTER (OUTPATIENT)
Dept: PHARMACY | Age: 79
Setting detail: THERAPIES SERIES
Discharge: HOME | End: 2024-07-03
Payer: MEDICARE

## 2024-07-03 VITALS
SYSTOLIC BLOOD PRESSURE: 177 MMHG | BODY MASS INDEX: 25.24 KG/M2 | HEART RATE: 86 BPM | DIASTOLIC BLOOD PRESSURE: 93 MMHG | WEIGHT: 156.4 LBS

## 2024-07-03 LAB — INR BLD: 2

## 2024-07-03 PROCEDURE — 85610 PROTHROMBIN TIME: CPT

## 2024-07-03 PROCEDURE — 99211 OFF/OP EST MAY X REQ PHY/QHP: CPT

## 2024-07-03 NOTE — PROGRESS NOTES
[facility]  [facility]ication Management  ANTICOAGULATION    Referring Provider: Dr. Usama Sterling      GOAL INR: 2.0-3.0     TODAY'S INR: 2.0     WARFARIN Dosage: Continue 1.5 mg W, 1 mg all other days of the week     INR (no units)   Date Value   2024 2.0   2024 1.8   2024 2.0   2024 2.2   2024 1.8   2023 3.5   10/11/2023 3       Hemoglobin   Date Value Ref Range Status   2024 13.0 12.0 - 16.0 g/dL Final     Hematocrit   Date Value Ref Range Status   2024 41.6 36.0 - 46.0 % Final     ALT   Date Value Ref Range Status   2024 15 5 - 33 U/L Final     AST   Date Value Ref Range Status   2024 26 <32 U/L Final       Medication changes:  Taking 1300 to 2600 mg of Tylenol daily    Notes:    Fingerstick INR drawn per clinic protocol. Patient states no visible blood in urine, black tarry stool, falls or ER visits and denies any missed or extra doses of warfarin. Taking 1300 to 2600 mg of Tylenol daily for knee and arthritis pain but no other changes in medication or diet. INR is therapeutic so will continue 1.5 mg W, 1 mg all other days and will recheck INR in 6 weeks. Patient acknowledges working in consult agreement with pharmacist as referred by his/her physician.    For Pharmacy Admin Tracking Only    Intervention Detail: Adherence Monitorin  Total # of Interventions Recommended: 1  Total # of Interventions Accepted: 1  Time Spent (min): 20    Jl Hein, PharmD 7/3/2024 10:49 AM

## 2024-08-14 ENCOUNTER — ANTI-COAG VISIT (OUTPATIENT)
Age: 79
End: 2024-08-14
Payer: MEDICARE

## 2024-08-14 VITALS
DIASTOLIC BLOOD PRESSURE: 98 MMHG | BODY MASS INDEX: 25.66 KG/M2 | WEIGHT: 159 LBS | HEART RATE: 80 BPM | SYSTOLIC BLOOD PRESSURE: 194 MMHG

## 2024-08-14 DIAGNOSIS — I48.91 ATRIAL FIBRILLATION, UNSPECIFIED TYPE (HCC): Primary | ICD-10-CM

## 2024-08-14 LAB
INTERNATIONAL NORMALIZATION RATIO, POC: 3.2
PROTHROMBIN TIME, POC: 0

## 2024-08-14 PROCEDURE — 99211 OFF/OP EST MAY X REQ PHY/QHP: CPT | Performed by: PHARMACIST

## 2024-08-14 PROCEDURE — 85610 PROTHROMBIN TIME: CPT | Performed by: PHARMACIST

## 2024-08-14 NOTE — PROGRESS NOTES
[facility]  Medication Management  ANTICOAGULATION    Referring Provider: Dr. Usama Sterling      GOAL INR: 2.0-3.0     TODAY'S INR: 3.2     WARFARIN Dosage: 1 mg x 1, then resume 1.5 mg W, 1 mg all other days of the week     INR (no units)   Date Value   2024 2.0   2024 1.8   2024 2.0   2024 2.2   2024 1.8   2023 3.5   10/11/2023 3     INR,(POC) (no units)   Date Value   2024 3.2       Hemoglobin   Date Value Ref Range Status   2024 13.0 12.0 - 16.0 g/dL Final     Hematocrit   Date Value Ref Range Status   2024 41.6 36.0 - 46.0 % Final     ALT   Date Value Ref Range Status   2024 15 5 - 33 U/L Final     AST   Date Value Ref Range Status   2024 26 <32 U/L Final       Medication changes:  Has been taking less Tylenol lately    Notes:    Fingerstick INR drawn per clinic protocol. Patient states no visible blood in urine, black tarry stool, falls or ER visits and denies any missed or extra doses of warfarin. Advises she has been taking less Tylenol lately and has been eating more fruits vs vegetables recently. No other changes in medication or diet. INR is slightly supratherapeutic so will order 1 mg for this evening prior to resuming 1.5 mg W, 1 mg all other days and will recheck INR in 3 weeks. Patient acknowledges working in consult agreement with pharmacist as referred by his/her physician.    For Pharmacy Admin Tracking Only    Intervention Detail: Adherence Monitorin and Dose Adjustment: 1, reason: Therapy Optimization  Total # of Interventions Recommended: 2  Total # of Interventions Accepted: 2  Time Spent (min): 20    Jl Hein, PharmD 2024 10:47 AM

## 2024-09-04 ENCOUNTER — ANTI-COAG VISIT (OUTPATIENT)
Age: 79
End: 2024-09-04
Payer: MEDICARE

## 2024-09-04 VITALS
SYSTOLIC BLOOD PRESSURE: 204 MMHG | HEART RATE: 74 BPM | DIASTOLIC BLOOD PRESSURE: 95 MMHG | WEIGHT: 156.6 LBS | BODY MASS INDEX: 25.28 KG/M2

## 2024-09-04 LAB
INTERNATIONAL NORMALIZATION RATIO, POC: 2.4
PROTHROMBIN TIME, POC: 0

## 2024-09-04 PROCEDURE — 85610 PROTHROMBIN TIME: CPT | Performed by: PHARMACIST

## 2024-09-04 PROCEDURE — 99211 OFF/OP EST MAY X REQ PHY/QHP: CPT | Performed by: PHARMACIST

## 2024-09-04 NOTE — PROGRESS NOTES
[facility]  [facility]ication [facility]  ANTICOAGULATION    Referring Provider: Dr. Usama Sterling      GOAL INR: 2.0-3.0     TODAY'S INR: 2.4     WARFARIN Dosage: Continue 1.5 mg W, 1 mg all other days of the week     INR (no units)   Date Value   2024 2.0   2024 1.8   2024 2.0   2024 2.2   2024 1.8   2023 3.5   10/11/2023 3     INR,(POC) (no units)   Date Value   2024 2.4   2024 3.2       Hemoglobin   Date Value Ref Range Status   2024 13.0 12.0 - 16.0 g/dL Final     Hematocrit   Date Value Ref Range Status   2024 41.6 36.0 - 46.0 % Final     ALT   Date Value Ref Range Status   2024 15 5 - 33 U/L Final     AST   Date Value Ref Range Status   2024 26 <32 U/L Final       Medication changes:  taking a little more Tylenol for knee pain    Notes:    Fingerstick INR drawn per clinic protocol. Patient states no visible blood in urine, black tarry stool, falls or ER visits but does inform of a skin tear on her leg but she was able to control the bleeding easily. Denies any missed or extra doses of warfarin. Advises she has been taking a little more Tylenol for knee pain and is trying to eat more vegetables. No other changes in medication or diet. INR is therapeutic so will continue 1.5 mg W, 1 mg all other days and will recheck INR in 5 weeks. Patient acknowledges working in consult agreement with pharmacist as referred by his/her physician.    For Pharmacy Admin Tracking Only    Intervention Detail: Adherence Monitorin  Total # of Interventions Recommended: 1  Total # of Interventions Accepted: 1  Time Spent (min): 20    Jl Hein, PharmD 2024 10:45 AM

## 2024-10-09 ENCOUNTER — ANTI-COAG VISIT (OUTPATIENT)
Age: 79
End: 2024-10-09
Payer: MEDICARE

## 2024-10-09 VITALS
BODY MASS INDEX: 25.41 KG/M2 | SYSTOLIC BLOOD PRESSURE: 162 MMHG | HEART RATE: 76 BPM | DIASTOLIC BLOOD PRESSURE: 82 MMHG | WEIGHT: 157.4 LBS

## 2024-10-09 LAB
INTERNATIONAL NORMALIZATION RATIO, POC: 2.7
PROTHROMBIN TIME, POC: 0

## 2024-10-09 PROCEDURE — 85610 PROTHROMBIN TIME: CPT | Performed by: PHARMACIST

## 2024-10-09 PROCEDURE — 99211 OFF/OP EST MAY X REQ PHY/QHP: CPT | Performed by: PHARMACIST

## 2024-10-09 NOTE — PROGRESS NOTES
[facility]  Medication Management  ANTICOAGULATION    Referring Provider: Dr. Usama Sterling      GOAL INR: 2.0-3.0     TODAY'S INR: 2.7     WARFARIN Dosage: Continue 1.5 mg W, 1 mg all other days of the week     INR (no units)   Date Value   2024 2.0   2024 1.8   2024 2.0   2024 2.2   2024 1.8   2023 3.5   10/11/2023 3     INR,(POC) (no units)   Date Value   10/09/2024 2.7   2024 2.4   2024 3.2       Hemoglobin   Date Value Ref Range Status   2024 13.0 12.0 - 16.0 g/dL Final     Hematocrit   Date Value Ref Range Status   2024 41.6 36.0 - 46.0 % Final     ALT   Date Value Ref Range Status   2024 15 5 - 33 U/L Final     AST   Date Value Ref Range Status   2024 26 <32 U/L Final       Medication changes:  None     Notes:    Fingerstick INR drawn per clinic protocol. Patient states no visible blood in urine, black tarry stool, falls or ER visits and denies any missed or extra doses of warfarin. No change in other maintenance medications or in diet. INR is therapeutic so will continue 1.5 mg W, 1 mg all other days and will recheck INR in 6 weeks. Patient acknowledges working in consult agreement with pharmacist as referred by his/her physician.    For Pharmacy Admin Tracking Only    Intervention Detail: Adherence Monitorin  Total # of Interventions Recommended: 1  Total # of Interventions Accepted: 1  Time Spent (min): 20    Jl Hein, PharmD 10/9/2024 12:16 PM

## 2024-11-22 ENCOUNTER — APPOINTMENT (OUTPATIENT)
Age: 79
End: 2024-11-22
Payer: MEDICARE

## 2024-12-06 ENCOUNTER — ANTI-COAG VISIT (OUTPATIENT)
Age: 79
End: 2024-12-06
Payer: MEDICARE

## 2024-12-06 VITALS
WEIGHT: 161.8 LBS | SYSTOLIC BLOOD PRESSURE: 197 MMHG | DIASTOLIC BLOOD PRESSURE: 104 MMHG | BODY MASS INDEX: 26.12 KG/M2 | HEART RATE: 70 BPM

## 2024-12-06 LAB
INTERNATIONAL NORMALIZATION RATIO, POC: 2.7
PROTHROMBIN TIME, POC: 0

## 2024-12-06 PROCEDURE — 99211 OFF/OP EST MAY X REQ PHY/QHP: CPT | Performed by: PHARMACIST

## 2024-12-06 PROCEDURE — 85610 PROTHROMBIN TIME: CPT | Performed by: PHARMACIST

## 2024-12-06 NOTE — PROGRESS NOTES
[facility]  Medication Management  ANTICOAGULATION    Referring Provider: Dr. Usama Sterling      GOAL INR: 2.0-3.0     TODAY'S INR: 2.7     WARFARIN Dosage: Continue 1.5 mg W, 1 mg all other days of the week     INR (no units)   Date Value   2024 2.0   2024 1.8   2024 2.0   2024 2.2   2024 1.8   2023 3.5   10/11/2023 3     INR,(POC) (no units)   Date Value   2024 2.7   10/09/2024 2.7   2024 2.4   2024 3.2       Hemoglobin   Date Value Ref Range Status   2024 13.0 12.0 - 16.0 g/dL Final     Hematocrit   Date Value Ref Range Status   2024 41.6 36.0 - 46.0 % Final     ALT   Date Value Ref Range Status   2024 15 5 - 33 U/L Final     AST   Date Value Ref Range Status   2024 26 <32 U/L Final       Medication changes:  None     Notes:    Fingerstick INR drawn per clinic protocol. Patient states no visible blood in urine, black tarry stool, falls or ER visits and denies any missed or extra doses of warfarin. No change in other maintenance medications or in diet. INR is therapeutic so will continue 1.5 mg W, 1 mg all other days and will recheck INR in 6 week(s). Patient acknowledges working in consult agreement with pharmacist as referred by his/her physician.    For Pharmacy Admin Tracking Only    Intervention Detail: Adherence Monitorin  Total # of Interventions Recommended: 1  Total # of Interventions Accepted: 1  Time Spent (min): 20    Jl Hein, PharmD 2024 2:45 PM

## 2025-01-17 ENCOUNTER — ANTI-COAG VISIT (OUTPATIENT)
Age: 80
End: 2025-01-17
Payer: MEDICARE

## 2025-01-17 VITALS
BODY MASS INDEX: 25.6 KG/M2 | DIASTOLIC BLOOD PRESSURE: 108 MMHG | SYSTOLIC BLOOD PRESSURE: 186 MMHG | WEIGHT: 158.6 LBS | HEART RATE: 69 BPM

## 2025-01-17 DIAGNOSIS — I48.91 ATRIAL FIBRILLATION, UNSPECIFIED TYPE (HCC): Primary | ICD-10-CM

## 2025-01-17 LAB
INTERNATIONAL NORMALIZATION RATIO, POC: 2.6
PROTHROMBIN TIME, POC: 0

## 2025-01-17 PROCEDURE — 85610 PROTHROMBIN TIME: CPT | Performed by: FAMILY MEDICINE

## 2025-01-17 PROCEDURE — 99211 OFF/OP EST MAY X REQ PHY/QHP: CPT | Performed by: FAMILY MEDICINE

## 2025-01-17 NOTE — PATIENT INSTRUCTIONS
Continue to take warfarin 1.5mg (1 1/2 tablets) Wednesday and 1mg (1 tablet) all other days.  Continue to monitor urine and stool for signs and symptoms of bleeding.   Please notify the clinic of any medication changes.   Please remember to bring all medications (both prescription and OTC) to your next visit.   Kindly notify the clinic if you are unable to make to your next appointment.   Follow warfarin dosing instructions on dosing calendar provided.

## 2025-01-17 NOTE — PROGRESS NOTES
[facility]  [facility]ication Management  ANTICOAGULATION    Referring Provider: Dr Sterling    GOAL INR: 2.0-3.0    TODAY'S INR: 2.6    WARFARIN Dosage: continue 1.5mg W, 1mg all other days    INR (no units)   Date Value   2024 2.0   2024 1.8   2024 2.0   2024 2.2   2024 1.8   2023 3.5   10/11/2023 3     INR,(POC) (no units)   Date Value   2025 2.6   2024 2.7   10/09/2024 2.7   2024 2.4   2024 3.2       Hemoglobin   Date Value Ref Range Status   2024 13.0 12.0 - 16.0 g/dL Final     Hematocrit   Date Value Ref Range Status   2024 41.6 36.0 - 46.0 % Final     ALT   Date Value Ref Range Status   2024 15 5 - 33 U/L Final     AST   Date Value Ref Range Status   2024 26 <32 U/L Final       Medication changes:  No change    Notes:    Fingerstick INR drawn per clinic protocol. Patient states no visible blood in urine and no black tarry stool. Denies any missed doses of warfarin. No change in other maintenance medications or in diet. Will recheck INR in 6 weeks. Patient acknowledges working in consult agreement with pharmacist as referred by his/her physician.                  For Pharmacy Admin Tracking Only    Intervention Detail: Adherence Monitorin  Total # of Interventions Recommended: 2  Total # of Interventions Accepted: 2  Time Spent (min): 20      YURI Swan.Ph., 2025,2:24 PM

## 2025-02-28 ENCOUNTER — ANTI-COAG VISIT (OUTPATIENT)
Age: 80
End: 2025-02-28
Payer: MEDICARE

## 2025-02-28 VITALS
SYSTOLIC BLOOD PRESSURE: 183 MMHG | WEIGHT: 155 LBS | BODY MASS INDEX: 25.02 KG/M2 | HEART RATE: 88 BPM | DIASTOLIC BLOOD PRESSURE: 106 MMHG

## 2025-02-28 LAB
INTERNATIONAL NORMALIZATION RATIO, POC: 1.9
PROTHROMBIN TIME, POC: 0

## 2025-02-28 PROCEDURE — 99211 OFF/OP EST MAY X REQ PHY/QHP: CPT | Performed by: PHARMACIST

## 2025-02-28 PROCEDURE — 85610 PROTHROMBIN TIME: CPT | Performed by: PHARMACIST

## 2025-02-28 NOTE — PROGRESS NOTES
[facility]  Medication Management  ANTICOAGULATION    Referring Provider: Dr. Usama Sterling      GOAL INR: 2.0-3.0     TODAY'S INR: 1.9     WARFARIN Dosage: 1.5 mg x 1, then resume 1.5 mg W, 1 mg all other days of the week    INR (no units)   Date Value   2024 2.0   2024 1.8   2024 2.0   2024 2.2   2024 1.8   2023 3.5   10/11/2023 3     INR,(POC) (no units)   Date Value   2025 1.9   2025 2.6   2024 2.7   10/09/2024 2.7   2024 2.4   2024 3.2       Hemoglobin   Date Value Ref Range Status   2024 13.0 12.0 - 16.0 g/dL Final     Hematocrit   Date Value Ref Range Status   2024 41.6 36.0 - 46.0 % Final     ALT   Date Value Ref Range Status   2024 15 5 - 33 U/L Final     AST   Date Value Ref Range Status   2024 26 <32 U/L Final       Medication changes:  None     Notes:    Fingerstick INR drawn per clinic protocol. Patient states no visible blood in urine, black tarry stool, falls or ER visits and denies any missed or extra doses of warfarin. No change in other maintenance medications or in diet. INR is slightly subtherapeutic so will order a boost of 1.5 mg this evening before resuming 1.5 mg W, 1 mg all other days and will recheck INR in 6 weeks. Patient acknowledges working in consult agreement with pharmacist as referred by his/her physician.    For Pharmacy Admin Tracking Only    Intervention Detail: Adherence Monitorin and Dose Adjustment: 1, reason: Therapy Optimization  Total # of Interventions Recommended: 2  Total # of Interventions Accepted: 2  Time Spent (min): 20    Jl Hein, PharmD 2025 9:48 AM

## 2025-03-07 RX ORDER — WARFARIN SODIUM 1 MG/1
TABLET ORAL
Qty: 100 TABLET | Refills: 3 | Status: SHIPPED | OUTPATIENT
Start: 2025-03-07

## 2025-04-11 ENCOUNTER — APPOINTMENT (OUTPATIENT)
Age: 80
End: 2025-04-11
Payer: MEDICARE

## 2025-04-18 ENCOUNTER — ANTI-COAG VISIT (OUTPATIENT)
Age: 80
End: 2025-04-18
Payer: MEDICARE

## 2025-04-18 VITALS
HEART RATE: 67 BPM | DIASTOLIC BLOOD PRESSURE: 91 MMHG | BODY MASS INDEX: 26.47 KG/M2 | SYSTOLIC BLOOD PRESSURE: 187 MMHG | WEIGHT: 164 LBS

## 2025-04-18 LAB
INTERNATIONAL NORMALIZATION RATIO, POC: 3.3
PROTHROMBIN TIME, POC: 0

## 2025-04-18 PROCEDURE — 85610 PROTHROMBIN TIME: CPT | Performed by: PHARMACIST

## 2025-04-18 PROCEDURE — 99211 OFF/OP EST MAY X REQ PHY/QHP: CPT | Performed by: PHARMACIST

## 2025-04-18 NOTE — PROGRESS NOTES
[facility]  Medication Management  ANTICOAGULATION    Referring Provider: Dr. Usama Sterling      GOAL INR: 2.0-3.0     TODAY'S INR: 3.3     WARFARIN Dosage: 0.5 mg x 1, then resume 1.5 mg W, 1 mg all other days of the week    INR (no units)   Date Value   2024 2.0   2024 1.8   2024 2.0   2024 2.2   2024 1.8   2023 3.5   10/11/2023 3     INR,(POC) (no units)   Date Value   2025 3.3   2025 1.9   2025 2.6   2024 2.7   10/09/2024 2.7   2024 2.4   2024 3.2       Hemoglobin   Date Value Ref Range Status   2024 13.0 12.0 - 16.0 g/dL Final     Hematocrit   Date Value Ref Range Status   2024 41.6 36.0 - 46.0 % Final     ALT   Date Value Ref Range Status   2024 15 5 - 33 U/L Final     AST   Date Value Ref Range Status   2024 26 <32 U/L Final       Medication changes:  None     Notes:    Fingerstick INR drawn per clinic protocol. Patient states no visible blood in urine, black tarry stool, falls or ER visits and denies any missed or extra doses of warfarin. No change in other maintenance medications or in diet. INR is a bit supratherapeutic this morning so will order 0.5 mg for today before resuming 1.5 mg W, 1 mg all other days and will recheck INR in 4 weeks. Patient acknowledges working in consult agreement with pharmacist as referred by his/her physician.    For Pharmacy Admin Tracking Only    Intervention Detail: Adherence Monitorin and Dose Adjustment: 1, reason: Therapy Optimization  Total # of Interventions Recommended: 2  Total # of Interventions Accepted: 2  Time Spent (min): 20    Jl Hein, PharmD 2025 10:56 AM

## 2025-04-21 RX ORDER — DOXAZOSIN 4 MG/1
4 TABLET ORAL DAILY
Qty: 90 TABLET | Refills: 3 | OUTPATIENT
Start: 2025-04-21

## 2025-05-16 ENCOUNTER — ANTI-COAG VISIT (OUTPATIENT)
Age: 80
End: 2025-05-16
Payer: MEDICARE

## 2025-05-16 VITALS — DIASTOLIC BLOOD PRESSURE: 91 MMHG | HEART RATE: 78 BPM | SYSTOLIC BLOOD PRESSURE: 187 MMHG

## 2025-05-16 DIAGNOSIS — I48.91 ATRIAL FIBRILLATION, UNSPECIFIED TYPE (HCC): Primary | ICD-10-CM

## 2025-05-16 LAB
INTERNATIONAL NORMALIZATION RATIO, POC: 3.2
PROTHROMBIN TIME, POC: 0

## 2025-05-16 PROCEDURE — 99212 OFFICE O/P EST SF 10 MIN: CPT | Performed by: FAMILY MEDICINE

## 2025-05-16 PROCEDURE — 85610 PROTHROMBIN TIME: CPT | Performed by: FAMILY MEDICINE

## 2025-05-16 NOTE — PATIENT INSTRUCTIONS
Please do not take warfarin today then resume warfarin 1.5mg (1 1/2 tablets) Wednesday and 1mg (1 tablet) all other days.  Continue to monitor urine and stool for signs and symptoms of bleeding.   Please notify the clinic of any medication changes.   Please remember to bring all medications (both prescription and OTC) to your next visit.   Kindly notify the clinic if you are unable to make to your next appointment.   Follow warfarin dosing instructions on dosing calendar provided.

## 2025-05-16 NOTE — PROGRESS NOTES
[facility]  Medication Management  ANTICOAGULATION    Referring Provider: Dr Sterling    GOAL INR: 2.0-3.0    TODAY'S INR: 3.2    WARFARIN Dosage: hold x1 then resume 1.5mg W, 1mg all other days    INR (no units)   Date Value   2024 2.0   2024 1.8   2024 2.0   2024 2.2   2024 1.8   2023 3.5   10/11/2023 3     INR,(POC) (no units)   Date Value   2025 3.2   2025 3.3   2025 1.9   2025 2.6   2024 2.7   10/09/2024 2.7   2024 2.4       Hemoglobin   Date Value Ref Range Status   2024 13.0 12.0 - 16.0 g/dL Final     Hematocrit   Date Value Ref Range Status   2024 41.6 36.0 - 46.0 % Final     ALT   Date Value Ref Range Status   2024 15 5 - 33 U/L Final     AST   Date Value Ref Range Status   2024 26 <32 U/L Final       Medication changes:  No change    Notes:    Fingerstick INR drawn per clinic protocol. Patient states no visible blood in urine and no black tarry stool. Denies any missed doses of warfarin. No change in other maintenance medications or in diet. Will recheck INR in 5 weeks. Patient acknowledges working in consult agreement with pharmacist as referred by his/her physician.                  For Pharmacy Admin Tracking Only    Intervention Detail: Adherence Monitorin and Dose Adjustment: 1, reason: Therapy Optimization  Total # of Interventions Recommended: 3  Total # of Interventions Accepted: 3  Time Spent (min): 20      Jytoi Khalil R.Ph., 2025,10:46 AM

## 2025-06-20 ENCOUNTER — ANTI-COAG VISIT (OUTPATIENT)
Age: 80
End: 2025-06-20
Payer: MEDICARE

## 2025-06-20 VITALS — HEART RATE: 82 BPM | DIASTOLIC BLOOD PRESSURE: 77 MMHG | SYSTOLIC BLOOD PRESSURE: 175 MMHG

## 2025-06-20 LAB
INTERNATIONAL NORMALIZATION RATIO, POC: 3.4
PROTHROMBIN TIME, POC: 0

## 2025-06-20 PROCEDURE — 99211 OFF/OP EST MAY X REQ PHY/QHP: CPT | Performed by: PHARMACIST

## 2025-06-20 PROCEDURE — 85610 PROTHROMBIN TIME: CPT | Performed by: PHARMACIST

## 2025-06-20 RX ORDER — SPIRONOLACTONE 25 MG/1
25 TABLET ORAL DAILY
COMMUNITY

## 2025-06-20 RX ORDER — CEPHALEXIN 500 MG/1
500 CAPSULE ORAL 3 TIMES DAILY
COMMUNITY
Start: 2025-06-13 | End: 2025-06-23

## 2025-06-20 NOTE — PROGRESS NOTES
[facility]  Medication Management  ANTICOAGULATION    Referring Provider: Dr. Usama Sterling      GOAL INR: 2.0-3.0     TODAY'S INR: 3.4     WARFARIN Dosage: 0.5 mg x 1, then decrease dose to 1 mg every day of the week    INR (no units)   Date Value   07/03/2024 2.0   06/05/2024 1.8   04/16/2024 2.0   03/01/2024 2.2   01/31/2024 1.8   11/28/2023 3.5   10/11/2023 3     INR,(POC) (no units)   Date Value   06/20/2025 3.4   05/16/2025 3.2   04/18/2025 3.3   02/28/2025 1.9   01/17/2025 2.6   12/06/2024 2.7   10/09/2024 2.7       Hemoglobin   Date Value Ref Range Status   01/31/2024 13.0 12.0 - 16.0 g/dL Final     Hematocrit   Date Value Ref Range Status   01/31/2024 41.6 36.0 - 46.0 % Final     ALT   Date Value Ref Range Status   01/31/2024 15 5 - 33 U/L Final     AST   Date Value Ref Range Status   01/31/2024 26 <32 U/L Final       Medication changes:  K+ stopped  Started on spironolactone 25 mg daily  Started on cephalexin 500 mg TID x 10 days (6/13-23)    Notes:    Fingerstick INR drawn per clinic protocol. Patient states no visible blood in urine, black tarry stool, falls or ER visits and denies any missed or extra doses of warfarin. She appears to have possible bilateral lower leg cellulitis and edema and went to the doctor a week ago. At that time, they stopped her potassium and started her on spironolactone 25 mg daily. She feels that she has noticed no difference in the amount of swelling in her legs or how much or often she is urinating. They also started her on cephalexin 500 mg, three times daily for 10 days (6/13-23). She has another appointment today and will discuss her diuretic then. No other changes in medication or diet. INR is supratherapeutic again so will order 0.5 mg for this evening before reducing her maintenance dose to 1 mg daily and will recheck INR in 1.5 weeks. Patient acknowledges working in consult agreement with pharmacist as referred by his/her

## 2025-06-27 ENCOUNTER — ANTI-COAG VISIT (OUTPATIENT)
Age: 80
End: 2025-06-27
Payer: MEDICARE

## 2025-06-27 LAB
INTERNATIONAL NORMALIZATION RATIO, POC: 2.6
PROTHROMBIN TIME, POC: 0

## 2025-06-27 PROCEDURE — 85610 PROTHROMBIN TIME: CPT | Performed by: PHARMACIST

## 2025-06-27 PROCEDURE — 99211 OFF/OP EST MAY X REQ PHY/QHP: CPT | Performed by: PHARMACIST

## 2025-06-27 RX ORDER — DOXYCYCLINE 100 MG/1
100 CAPSULE ORAL 2 TIMES DAILY
COMMUNITY
Start: 2025-06-23 | End: 2025-07-02

## 2025-06-27 NOTE — PROGRESS NOTES
[facility]  Medication Management  ANTICOAGULATION    Referring Provider: Dr. Usama Sterling      GOAL INR: 2.0-3.0     TODAY'S INR: 2.6     WARFARIN Dosage: Continue 1 mg every day of the week    INR (no units)   Date Value   2024 2.0   2024 1.8   2024 2.0   2024 2.2   2024 1.8   2023 3.5   10/11/2023 3     INR,(POC) (no units)   Date Value   2025 2.6   2025 3.4   2025 3.2   2025 3.3   2025 1.9   2025 2.6   2024 2.7       Hemoglobin   Date Value Ref Range Status   2024 13.0 12.0 - 16.0 g/dL Final     Hematocrit   Date Value Ref Range Status   2024 41.6 36.0 - 46.0 % Final     ALT   Date Value Ref Range Status   2024 15 5 - 33 U/L Final     AST   Date Value Ref Range Status   2024 26 <32 U/L Final       Medication changes:  Started on doxycycline 100 mg twice daily for 10 days    Notes:    Fingerstick INR drawn per clinic protocol. Patient states no visible blood in urine, black tarry stool, falls or ER visits and denies any missed or extra doses of warfarin. She was started on doxycycline 100 mg twice daily for 10 days on  (last dose ). No other changes in medication or diet. INR is therapeutic after 5 days of doxycycline so will continue 1 mg daily and will recheck INR in 4 weeks. Patient acknowledges working in consult agreement with pharmacist as referred by his/her physician.    For Pharmacy Admin Tracking Only    Intervention Detail: Adherence Monitorin  Total # of Interventions Recommended: 1  Total # of Interventions Accepted: 1  Time Spent (min): 20    Jl Hein, PharmD 2025 3:17 PM

## 2025-07-01 ENCOUNTER — APPOINTMENT (OUTPATIENT)
Dept: CT IMAGING | Age: 80
End: 2025-07-01
Payer: MEDICARE

## 2025-07-01 ENCOUNTER — HOSPITAL ENCOUNTER (EMERGENCY)
Age: 80
Discharge: HOME | End: 2025-07-01
Payer: MEDICARE

## 2025-07-01 ENCOUNTER — APPOINTMENT (OUTPATIENT)
Dept: GENERAL RADIOLOGY | Age: 80
End: 2025-07-01
Payer: MEDICARE

## 2025-07-01 VITALS
DIASTOLIC BLOOD PRESSURE: 97 MMHG | HEIGHT: 66 IN | SYSTOLIC BLOOD PRESSURE: 185 MMHG | TEMPERATURE: 97.2 F | OXYGEN SATURATION: 94 % | WEIGHT: 175 LBS | HEART RATE: 80 BPM | BODY MASS INDEX: 28.12 KG/M2 | RESPIRATION RATE: 20 BRPM

## 2025-07-01 DIAGNOSIS — R42 EPISODIC LIGHTHEADEDNESS: Primary | ICD-10-CM

## 2025-07-01 LAB
-: NORMAL
ALBUMIN SERPL-MCNC: 3.3 G/DL (ref 3.5–5.2)
ALBUMIN/GLOB SERPL: 1.1 {RATIO} (ref 1–2.5)
ALP SERPL-CCNC: 124 U/L (ref 35–104)
ALT SERPL-CCNC: 23 U/L (ref 5–33)
ANION GAP SERPL CALCULATED.3IONS-SCNC: 8 MMOL/L (ref 9–17)
AST SERPL-CCNC: 34 U/L
BASOPHILS # BLD: 0.02 K/UL (ref 0–0.2)
BASOPHILS NFR BLD: 0 % (ref 0–2)
BILIRUB DIRECT SERPL-MCNC: 0.4 MG/DL
BILIRUB INDIRECT SERPL-MCNC: 0.4 MG/DL (ref 0–1)
BILIRUB SERPL-MCNC: 0.8 MG/DL (ref 0.3–1.2)
BILIRUB UR QL STRIP: NEGATIVE
BUN SERPL-MCNC: 11 MG/DL (ref 8–23)
CALCIUM SERPL-MCNC: 8.6 MG/DL (ref 8.6–10.4)
CHLORIDE SERPL-SCNC: 106 MMOL/L (ref 98–107)
CHP ED QC CHECK: NORMAL
CLARITY UR: CLEAR
CO2 SERPL-SCNC: 27 MMOL/L (ref 20–31)
COLOR UR: YELLOW
COMMENT: ABNORMAL
CREAT SERPL-MCNC: 0.7 MG/DL (ref 0.5–0.9)
EOSINOPHIL # BLD: 0.06 K/UL (ref 0–0.4)
EOSINOPHILS RELATIVE PERCENT: 1 % (ref 0–5)
EPI CELLS #/AREA URNS HPF: NORMAL /HPF
ERYTHROCYTE [DISTWIDTH] IN BLOOD BY AUTOMATED COUNT: 15.2 % (ref 12.1–15.2)
GFR, ESTIMATED: 86 ML/MIN/1.73M2
GLOBULIN SER CALC-MCNC: 3 G/DL (ref 1.5–3.8)
GLUCOSE BLD-MCNC: 195 MG/DL
GLUCOSE BLD-MCNC: 197 MG/DL (ref 65–99)
GLUCOSE SERPL-MCNC: 194 MG/DL (ref 70–99)
GLUCOSE UR STRIP-MCNC: NEGATIVE MG/DL
HCT VFR BLD AUTO: 38.1 % (ref 36–46)
HGB BLD-MCNC: 11.9 G/DL (ref 12–16)
HGB UR QL STRIP.AUTO: ABNORMAL
IMM GRANULOCYTES # BLD AUTO: 0.01 K/UL (ref 0–0.3)
IMM GRANULOCYTES NFR BLD: 0 % (ref 0–5)
INR PPP: 2.9
KETONES UR STRIP-MCNC: NEGATIVE MG/DL
LEUKOCYTE ESTERASE UR QL STRIP: NEGATIVE
LYMPHOCYTES NFR BLD: 0.79 K/UL (ref 1–4.8)
LYMPHOCYTES RELATIVE PERCENT: 11 % (ref 15–40)
MAGNESIUM SERPL-MCNC: 1.6 MG/DL (ref 1.6–2.6)
MCH RBC QN AUTO: 27.8 PG (ref 26–34)
MCHC RBC AUTO-ENTMCNC: 31.2 G/DL (ref 31–37)
MCV RBC AUTO: 89 FL (ref 80–100)
MONOCYTES NFR BLD: 0.56 K/UL (ref 0–1)
MONOCYTES NFR BLD: 8 % (ref 4–8)
NEUTROPHILS NFR BLD: 80 % (ref 47–75)
NEUTS SEG NFR BLD: 5.82 K/UL (ref 2.5–7)
NITRITE UR QL STRIP: NEGATIVE
PH UR STRIP: 6.5 [PH] (ref 5–8)
PLATELET # BLD AUTO: 226 K/UL (ref 140–450)
PMV BLD AUTO: 9.9 FL (ref 6–12)
POTASSIUM SERPL-SCNC: 3.3 MMOL/L (ref 3.7–5.3)
PROT SERPL-MCNC: 6.3 G/DL (ref 6.4–8.3)
PROT UR STRIP-MCNC: ABNORMAL MG/DL
PROTHROMBIN TIME: 31.4 SEC (ref 11.5–14.2)
RBC # BLD AUTO: 4.28 M/UL (ref 4–5.2)
RBC #/AREA URNS HPF: NORMAL /HPF (ref 0–2)
SODIUM SERPL-SCNC: 141 MMOL/L (ref 135–144)
SP GR UR STRIP: 1.01 (ref 1–1.03)
TROPONIN I SERPL HS-MCNC: 21 NG/L (ref 0–14)
TSH SERPL DL<=0.05 MIU/L-ACNC: 2.63 UIU/ML (ref 0.27–4.2)
UROBILINOGEN UR STRIP-ACNC: NORMAL EU/DL (ref 0–1)
WBC #/AREA URNS HPF: NORMAL /HPF
WBC OTHER # BLD: 7.3 K/UL (ref 3.5–11)

## 2025-07-01 PROCEDURE — 70450 CT HEAD/BRAIN W/O DYE: CPT

## 2025-07-01 PROCEDURE — 81001 URINALYSIS AUTO W/SCOPE: CPT

## 2025-07-01 PROCEDURE — 71045 X-RAY EXAM CHEST 1 VIEW: CPT

## 2025-07-01 PROCEDURE — 99285 EMERGENCY DEPT VISIT HI MDM: CPT

## 2025-07-01 PROCEDURE — 93005 ELECTROCARDIOGRAM TRACING: CPT

## 2025-07-01 PROCEDURE — 85025 COMPLETE CBC W/AUTO DIFF WBC: CPT

## 2025-07-01 PROCEDURE — 83735 ASSAY OF MAGNESIUM: CPT

## 2025-07-01 PROCEDURE — 84484 ASSAY OF TROPONIN QUANT: CPT

## 2025-07-01 PROCEDURE — 85610 PROTHROMBIN TIME: CPT

## 2025-07-01 PROCEDURE — 6370000000 HC RX 637 (ALT 250 FOR IP)

## 2025-07-01 PROCEDURE — 80076 HEPATIC FUNCTION PANEL: CPT

## 2025-07-01 PROCEDURE — 80048 BASIC METABOLIC PNL TOTAL CA: CPT

## 2025-07-01 PROCEDURE — 87086 URINE CULTURE/COLONY COUNT: CPT

## 2025-07-01 PROCEDURE — 84443 ASSAY THYROID STIM HORMONE: CPT

## 2025-07-01 PROCEDURE — 82947 ASSAY GLUCOSE BLOOD QUANT: CPT

## 2025-07-01 RX ORDER — POTASSIUM CHLORIDE 750 MG/1
40 TABLET, EXTENDED RELEASE ORAL ONCE
Status: COMPLETED | OUTPATIENT
Start: 2025-07-01 | End: 2025-07-01

## 2025-07-01 RX ADMIN — POTASSIUM CHLORIDE 40 MEQ: 750 TABLET, FILM COATED, EXTENDED RELEASE ORAL at 14:45

## 2025-07-01 ASSESSMENT — PAIN - FUNCTIONAL ASSESSMENT: PAIN_FUNCTIONAL_ASSESSMENT: NONE - DENIES PAIN

## 2025-07-01 ASSESSMENT — LIFESTYLE VARIABLES
HOW MANY STANDARD DRINKS CONTAINING ALCOHOL DO YOU HAVE ON A TYPICAL DAY: PATIENT DOES NOT DRINK
HOW OFTEN DO YOU HAVE A DRINK CONTAINING ALCOHOL: NEVER

## 2025-07-01 NOTE — ED PROVIDER NOTES
[facility]  EMERGENCY DEPARTMENT ENCOUNTER      Pt Name: Rosalva Victor  MRN: [id number]  Birthdate 1945  Date of evaluation: 7/1/2025  Provider: Naveen Cortes MD    CHIEF COMPLAINT       Chief Complaint   Patient presents with    Dizziness     Pt states she walked to the bathroom and started to get dizzy/lightheaded and feeling weak       HISTORY OF PRESENT ILLNESS      Rosalva Victor is a 80 y.o., female ,  has a past medical history of Dropfoot, Foot drop, and Hypertension, atrial fibrillation on warfarin, hyperlipidemia, chronic back pain, hypertension. who was evaluated in the emergency room for Dizziness.  Patient was walking out of her bathroom, had an abrupt episode of which she describes as lightheadedness, feeling like she was in a pass out.  She sat down and tried to call her daughter, says she could not see the phone clearly was having difficulty calling, says an episode like this is never happened before.  Denies recent illnesses, says she has been doing well overall, currently taking antibiotics for bilateral lower extremity cellulitis.        REVIEW OF SYSTEMS       Review of system: Negative except for what is mentioned in the HPI.      PAST MEDICAL HISTORY     Past Medical History:   Diagnosis Date    Dropfoot     left foot    Foot drop     left     Hypertension        SURGICAL HISTORY       Past Surgical History:   Procedure Laterality Date    ANKLE SURGERY      right    APPENDECTOMY      CATARACT REMOVAL      FRACTURE SURGERY      Pt has a plate and 8 screws in rt ankle    HIP FRACTURE SURGERY Right 10/12/2018    Intramedullary Dary placement [employer]    HYSTERECTOMY (CERVIX STATUS UNKNOWN)      OR OFFICE/OUTPT VISIT,PROCEDURE ONLY Right 10/12/2018    RIGHT HIP INTRAMEDULLAR DARY - [employer] CM Nail performed by Darrel Hayes DO at [facility] OR    UPPER GASTROINTESTINAL ENDOSCOPY N/A 6/4/2021    EGD BIOPSY performed by Ike Blake MD at [facility] ENDOSCOPY    WRIST FRACTURE

## 2025-07-02 ENCOUNTER — APPOINTMENT (OUTPATIENT)
Age: 80
End: 2025-07-02
Payer: MEDICARE

## 2025-07-02 LAB
EKG Q-T INTERVAL: 410 MS
EKG QRS DURATION: 136 MS
EKG QTC CALCULATION (BAZETT): 493 MS
EKG R AXIS: 95 DEGREES
EKG T AXIS: 70 DEGREES
EKG VENTRICULAR RATE: 87 BPM
MICROORGANISM SPEC CULT: NO GROWTH
SPECIMEN DESCRIPTION: NORMAL

## 2025-07-02 PROCEDURE — 93010 ELECTROCARDIOGRAM REPORT: CPT | Performed by: INTERNAL MEDICINE

## 2025-07-03 ENCOUNTER — RESULTS FOLLOW-UP (OUTPATIENT)
Dept: EMERGENCY DEPT | Age: 80
End: 2025-07-03

## 2025-07-22 ENCOUNTER — TELEPHONE (OUTPATIENT)
Age: 80
End: 2025-07-22

## 2025-07-22 NOTE — TELEPHONE ENCOUNTER
Received a call from Rosalva who initially just wanted to reschedule. She then mentioned an ER visit on 7/1 after she became very dizzy and confused. She was brought to the ER by [facility] where multiple scans and labs were taken which were all normal. Her potassium was slightly low so the ER gave her an oral supplement but no other medications were given. She was released and fortunately has not had any further episodes. Rescheduled her appt on 7/25 to 7/29 per pt request.   Jl Hein, PharmD 7/22/2025 11:12 AM

## 2025-07-25 ENCOUNTER — APPOINTMENT (OUTPATIENT)
Age: 80
End: 2025-07-25
Payer: MEDICARE

## 2025-07-29 ENCOUNTER — ANTI-COAG VISIT (OUTPATIENT)
Age: 80
End: 2025-07-29
Payer: MEDICARE

## 2025-07-29 VITALS
HEART RATE: 81 BPM | DIASTOLIC BLOOD PRESSURE: 99 MMHG | SYSTOLIC BLOOD PRESSURE: 191 MMHG | BODY MASS INDEX: 27.96 KG/M2 | WEIGHT: 173.2 LBS

## 2025-07-29 DIAGNOSIS — I48.91 ATRIAL FIBRILLATION, UNSPECIFIED TYPE (HCC): Primary | ICD-10-CM

## 2025-07-29 LAB
INTERNATIONAL NORMALIZATION RATIO, POC: 2.1
PROTHROMBIN TIME, POC: 0

## 2025-07-29 PROCEDURE — 85610 PROTHROMBIN TIME: CPT

## 2025-07-29 PROCEDURE — 99211 OFF/OP EST MAY X REQ PHY/QHP: CPT

## 2025-07-29 NOTE — PROGRESS NOTES
[facility]  [facility]ication Management  ANTICOAGULATION    Referring Provider: Dr. Usama Sterling      GOAL INR: 2.0 - 3.0     TODAY'S INR: 2.1     WARFARIN Dosage: Continue 1 mg every day of the week      INR (no units)   Date Value   07/01/2025 2.9   07/03/2024 2.0   06/05/2024 1.8   04/16/2024 2.0   03/01/2024 2.2   01/31/2024 1.8   11/28/2023 3.5     INR,(POC) (no units)   Date Value   06/27/2025 2.6   06/20/2025 3.4   05/16/2025 3.2   04/18/2025 3.3   02/28/2025 1.9   01/17/2025 2.6   12/06/2024 2.7       Hemoglobin   Date Value Ref Range Status   07/01/2025 11.9 (L) 12.0 - 16.0 g/dL Final     Hematocrit   Date Value Ref Range Status   07/01/2025 38.1 36.0 - 46.0 % Final     ALT   Date Value Ref Range Status   07/01/2025 23 5 - 33 U/L Final     AST   Date Value Ref Range Status   07/01/2025 34 (H) <32 U/L Final       Medication changes:  Still taking Spironolactone 25 mg daily and off Potassium supplement   Completed course of Doxycycline for lower extremity cellulitis on 7/2/2025      Notes:     Fingerstick INR drawn per clinic protocol. Patient states no visible blood in urine and no black tarry stool. She denies any falls or ER visits and denies any missed or extra doses of warfarin. As discussed previously, Rosalva had been started on Spironolactone 25 mg daily (which can decrease INR) for bilateral lower leg cellulitis and stopped her Potassium supplement per Dr. Maurizio Gomez. She had also been prescribed a 10-day course of Cephalexin for cellulitis and then a 10-day course of Doxycycline thereafter (both of which she has completed at this time). She will follow up with Dr. Gomez again today after this appointment. I have requested that she call our clinic if she is started on any other antibiotic or other medication regimen/ changes. No other changes in maintenance medications or in diet. Since her INR is 2.1 today, we will have her take 1 mg warfarin tonight and then continue current

## 2025-07-30 ENCOUNTER — TELEPHONE (OUTPATIENT)
Dept: CARDIOLOGY CLINIC | Age: 80
End: 2025-07-30

## 2025-07-30 NOTE — TELEPHONE ENCOUNTER
I left a message to let Rosalva know that Dr. Gomez called and asked us to schedule an appointment for Rosalva and that she also needs an echo.

## 2025-08-27 ENCOUNTER — ANTI-COAG VISIT (OUTPATIENT)
Age: 80
End: 2025-08-27
Payer: MEDICARE

## 2025-08-27 VITALS
WEIGHT: 158 LBS | SYSTOLIC BLOOD PRESSURE: 168 MMHG | BODY MASS INDEX: 25.5 KG/M2 | DIASTOLIC BLOOD PRESSURE: 79 MMHG | HEART RATE: 44 BPM

## 2025-08-27 LAB
INTERNATIONAL NORMALIZATION RATIO, POC: 2.7
PROTHROMBIN TIME, POC: NORMAL

## 2025-08-27 PROCEDURE — 85610 PROTHROMBIN TIME: CPT | Performed by: PHARMACIST

## 2025-08-27 PROCEDURE — 99211 OFF/OP EST MAY X REQ PHY/QHP: CPT | Performed by: PHARMACIST

## 2025-08-27 RX ORDER — FUROSEMIDE 20 MG/1
20 TABLET ORAL DAILY
COMMUNITY

## (undated) DEVICE — GUIDE PIN 3.2MM

## (undated) DEVICE — SKIN MARKER,REGULAR TIP WITH RULER: Brand: DEVON

## (undated) DEVICE — TOWEL,OR,DSP,ST,BLUE,STD,4/PK,20PK/CS: Brand: MEDLINE

## (undated) DEVICE — SPONGE LAP W18XL18IN WHT COT 4 PLY FLD STRUNG RADPQ DISP ST

## (undated) DEVICE — 1200CC SUCTION CANISTER WITH HYDROPHOBIC FILTER AND RED LID: Brand: BEMIS

## (undated) DEVICE — REAGENT TEST UREASE RAPD CLOTEST F/

## (undated) DEVICE — CUSTOM PACK: Brand: UNBRANDED

## (undated) DEVICE — ELECTRODE ES AD CRD L15FT DISP FOR PT BELOW 30LB REM

## (undated) DEVICE — GLOVE SURG SZ 85 L12IN FNGR THK87MIL WHT LTX FREE

## (undated) DEVICE — PACK,ORTHOPEDIC III,AURORA: Brand: MEDLINE

## (undated) DEVICE — DRAPE SURG XL W76XL100IN UNIV SMS FAB CTRL + REINF FLAME

## (undated) DEVICE — DRAPE,U/ SHT,SPLIT,PLAS,STERIL: Brand: MEDLINE

## (undated) DEVICE — FORCEP SPEC RETRV BX AD 2 MMX155 CM 5 MM GI OVL CUP W/ NDL

## (undated) DEVICE — STAPLER SKIN H3.9MM WIRE DIA0.58MM CRWN 6.9MM 35 STPL ROT

## (undated) DEVICE — ABDOMINAL PAD: Brand: DERMACEA

## (undated) DEVICE — BLADE SURG NO15 S STL STR DISP GLASSVAN

## (undated) DEVICE — GUIDE WIRE 3X1000MM ST

## (undated) DEVICE — TABLE COVER: Brand: CONVERTORS

## (undated) DEVICE — BIT DRILL 4.3MM

## (undated) DEVICE — SOLUTION IV IRRIG POUR BRL 0.9% SODIUM CHL 2F7124

## (undated) DEVICE — JELLY LUBRICATING 4OZ FLIP TOP TB E Z

## (undated) DEVICE — 6617 IOBAN II PATIENT ISOLATION DRAPE 5/BX,4BX/CS: Brand: STERI-DRAPE™ IOBAN™ 2

## (undated) DEVICE — SYRINGE BULB 50/CS 48/PLT: Brand: MEDEGEN MEDICAL PRODUCTS, LLC

## (undated) DEVICE — SUTURE VCRL SZ 0 L27IN ABSRB UD L36MM CT-1 1/2 CIR J260H

## (undated) DEVICE — GLOVE SURG SZ 85 L12IN FNGR THK13MIL WHT ISOLEX POLYISOPRENE

## (undated) DEVICE — MEDI-VAC NON-CONDUCTIVE SUCTION TUBING 6MM X 6.1M (20 FT.) L: Brand: CARDINAL HEALTH

## (undated) DEVICE — SUTURE ABSORBABLE BRAIDED 2-0 CT-1 27 IN UD VICRYL J259H

## (undated) DEVICE — GOWN,AURORA,NON-REINFORCED,2XL: Brand: MEDLINE

## (undated) DEVICE — SPONGE GZ W4XL4IN COT 12 PLY TYP VII WVN C FLD DSGN

## (undated) DEVICE — GLOVE ORTHO 8   MSG9480

## (undated) DEVICE — DRAPE SURG L 60X76IN SMS FAB UNIV ANCIL RESIST FLAME TEARING

## (undated) DEVICE — NDLCTR: FOAM/MAG 40CT 64/CS: Brand: MEDICAL ACTION INDUSTRIES

## (undated) DEVICE — PAD,ABDOMINAL,8"X7.5",ST,LF,20/BX: Brand: MEDLINE INDUSTRIES, INC.

## (undated) DEVICE — GOWN,AURORA,NONRNF,XL,30/CS: Brand: MEDLINE

## (undated) DEVICE — BLOCK BITE AD OPN SZ 48FR MOUTHPC ENDOSCP STURDY W/ FOAM

## (undated) DEVICE — 3M™ TEGADERM™ +PAD FILM DRESSING WITH NON-ADHERENT PAD, 3586, 3-1/2 IN X 4 IN (9 CM X 10 CM), 25/CAR, 4 CAR/CS: Brand: 3M™ TEGADERM™

## (undated) DEVICE — BANDAGE,GAUZE,BULKEE II,4.5"X4.1YD,STRL: Brand: MEDLINE

## (undated) DEVICE — TIP ELECSURG BOVIE

## (undated) DEVICE — CHLORAPREP 26ML ORANGE